# Patient Record
Sex: FEMALE | Race: WHITE | NOT HISPANIC OR LATINO | Employment: OTHER | URBAN - METROPOLITAN AREA
[De-identification: names, ages, dates, MRNs, and addresses within clinical notes are randomized per-mention and may not be internally consistent; named-entity substitution may affect disease eponyms.]

---

## 2017-04-11 ENCOUNTER — HOSPITAL ENCOUNTER (OUTPATIENT)
Dept: MAMMOGRAPHY | Facility: MEDICAL CENTER | Age: 77
Discharge: HOME | End: 2017-04-11
Payer: MEDICARE

## 2017-04-11 DIAGNOSIS — Z12.31 ENCOUNTER FOR SCREENING MAMMOGRAM FOR MALIGNANT NEOPLASM OF BREAST: ICD-10-CM

## 2017-04-11 PROCEDURE — G0202 SCR MAMMO BI INCL CAD: HCPCS

## 2017-06-02 ENCOUNTER — ALLSCRIPTS OFFICE VISIT (OUTPATIENT)
Dept: OTHER | Facility: OTHER | Age: 77
End: 2017-06-02

## 2017-10-20 ENCOUNTER — TRANSCRIBE ORDERS (OUTPATIENT)
Dept: ADMINISTRATIVE | Facility: HOSPITAL | Age: 77
End: 2017-10-20

## 2017-10-20 DIAGNOSIS — E04.1 NONTOXIC UNINODULAR GOITER: Primary | ICD-10-CM

## 2017-11-27 ENCOUNTER — HOSPITAL ENCOUNTER (OUTPATIENT)
Dept: ULTRASOUND IMAGING | Facility: MEDICAL CENTER | Age: 77
Discharge: HOME | End: 2017-11-27
Payer: MEDICARE

## 2017-11-27 DIAGNOSIS — E04.1 NONTOXIC UNINODULAR GOITER: ICD-10-CM

## 2017-11-27 PROCEDURE — 76536 US EXAM OF HEAD AND NECK: CPT

## 2017-12-01 ENCOUNTER — ALLSCRIPTS OFFICE VISIT (OUTPATIENT)
Dept: OTHER | Facility: OTHER | Age: 77
End: 2017-12-01

## 2017-12-05 NOTE — PROGRESS NOTES
Assessment  1  Nontoxic single thyroid nodule (241 0) (E04 1)    Plan  Nontoxic single thyroid nodule    · US THYROID; Status:Hold For - Scheduling; Requested for:[id number];    Perform:[facility]; Due:81Xsb9748;Ordered;single thyroid nodule; Ordered By:Humaira Ingram;   · Follow Up in 2 Years Evaluation and Treatment  Follow-up  Status: Hold For - Scheduling Requested for: [id number]   Ordered;Nontoxic single thyroid nodule; Ordered By: Demetri Butt Performed:  Due: [id number]    Discussion/Summary  Discussion Summary:   Left thyroid nodule, stable over the last 6 years  Previous biopsy benign  Options discussed with patient including follow-up in the next 2 years versus follow-up on a p r n  basis  She is opting for 2 year follow-up  We will therefore order ultrasound and see her afterwards  Counseling Documentation With Imm: The patient was counseled regarding diagnostic results,-- instructions for management,-- impressions,-- importance of compliance with treatment  Chief Complaint  Chief Complaint Free Text Note Form: Pt is here for her 2 year thyroid follow-up  new complaints at this time  History of Present Illness  Interval History: Mrs Ramona Delvalle is a 43-year-old woman here for thyroid nodules surveillance  She's had no endocrine issues since I last saw her  We have followed her for the last few years for incidentally found thyroid nodule, Which has been stable  She has no history of radiation exposure to the header neck area or a personal or family history of thyroid cancer  She had her ultrasound done in anticipation of today's visit  Review of Systems  Complete Female ROS SurgOnc:  Constitutional: The patient denies new or recent history of general fatigue, no recent weight loss, no change in appetite  Eyes: No complaints of visual problems, no scleral icterus    ENT: no complaints of ear pain, no hoarseness, no difficulty swallowing,-- no tinnitus-- and-- no new masses in head, oral cavity, or neck  Cardiovascular: No complaints of chest pain, no palpitations, no ankle edema  Respiratory: No complaints of shortness of breath, no cough  Gastrointestinal: No complaints of jaundice, no bloody stools, no pale stools  Genitourinary: No complaints of dysuria, no hematuria, no nocturia, no frequent urination, no urethral discharge  Musculoskeletal: No complaints of weakness, paralysis, joint stiffness or arthralgias,  Integumentary: No complaints of rash, no new lesions  Neurological: No complaints of convulsions, no seizures, no dizziness  Hematologic/Lymphatic: No complaints of easy bruising  ROS Reviewed:   ROS reviewed  Active Problems    1  Abdominal pain of multiple sites (789 09) (R10 9)   2  History of Acute venous embolism and thrombosis of deep vessels of distal lower extremity (453 42) (I82 4Z9)   3  Chemotherapeutics   4  Depression (311) (F32 9)   5  Edema of left lower extremity (782 3) (R60 0)   6  Encounter for screening mammogram for malignant neoplasm of breast (V76 12) (Z12 31)   7  Hypertension (401 9) (I10)   8  Hypothyroidism (244 9) (E03 9)   9  Lymphedema (457 1) (I89 0)   10  Malignant tumor of cervix (180 9) (C53 9)   11  Non Hodgkin's lymphoma (202 80) (C85 90)   12  Nontoxic single thyroid nodule (241 0) (E04 1)   13  Radiation Therapy   14  Secondary cancer of lung (197 0) (C78 00)    Past Medical History    1  History of Acute venous embolism and thrombosis of deep vessels of distal lower extremity (453 42) (I82 4Z9)   2  History of Ankle Joint Swelling (719 07)   3  History of Cerebral Artery Occlusion (434 90)   4  History of Colon Obstruction (560 9)   5  History of Decrease in appetite (783 0) (R63 0)   6  History of Hoarseness (784 42)   7  Hypertension (401 9) (I10)   8  Hypothyroidism (244 9) (E03 9)   9  History of Recent Weight Loss Involuntary    Surgical History    1  History of Back Surgery   2   History of  Section   · x 2, vertical incision   3  Chemotherapeutics   4  History of Foot Surgery   5  History of Partial Colectomy   6  Radiation Therapy   · pelvic radiation   7  History of Removal Of Lesion   8  History of Staging Lymphadenectomy - Para-aortic   9  History of Staging Lymphadenectomy - Pelvic   10  History of Total Abdominal Hysterectomy With Removal Of Both Tubes   · bilateral salpingectomy, pelvic and paraiaortic LND  Surgical History Reviewed: The surgical history was reviewed and updated today  Family History  Maternal Aunt    1  Family history of Breast Cancer (V16 3)  Family History Reviewed: The family history was reviewed and updated today  Social History     · Denied: History of Alcohol Use (History)   · Denied: History of Drug Use   · Never A Smoker  Social History Reviewed: The social history was reviewed and updated today  The social history was reviewed and is unchanged  Current Meds   1  Advil 200 MG Oral Tablet; 2 tabs 3 times dailyas needed; Therapy: (Recorded:16Fnc7487) to Recorded   2  Aspir-81 81 MG Oral Tablet Delayed Release; Therapy: 12LNO2237 to Recorded   3  Daily Multiple Vitamins Oral Tablet; TAKE 1 TABLET DAILY; Therapy: (Terrence Sleet) to Recorded   4  DiazePAM 5 MG Oral Tablet; 2 5 MG at bedtime; Therapy: (Recorded:35Cbq8222) to Recorded   5  EPA CAPS; 1000mg 2 x daily; Therapy: (Recorded:92Vha4129) to Recorded   6  Garlic TABS; TAKE AS DIRECTED; Therapy: (Recorded:86Yww0391) to Recorded   7  Immunocal PACK; 4 packs daily  2 in the am and 2 in the pm; Therapy: (Recorded:09Zgk4235) to Recorded   8  Lisinopril 5 MG Oral Tablet; 2 5 mg daily; Therapy: (Recorded:51Lcv8141) to Recorded   9  Omeprazole 20 MG Oral Capsule Delayed Release; Therapy: 12PIT7335 to Recorded   10  Probiotic Oral Capsule; Daily; Therapy: (Terrence Sleet) to Recorded   11  Synthroid 50 MCG Oral Tablet; TAKE 1 TABLET DAILY AS DIRECTED; Therapy: (Terrence Sleet) to Recorded   12   Turmeric CAPS; Therapy: ([id number]) to Recorded   13  Ubiquinol 100 MG Oral Capsule; 1 tablet daily; Therapy: (Recorded:66Ofw3823) to Recorded   14  Vitamin C 500 MG Oral Capsule; Two tablets daily each 1000 mg; Therapy: (Recorded:24Wfy1807) to Recorded   15  Vitamin D3 2000 UNIT Oral Tablet; Take 1 tablet daily; Therapy: (Recorded:55Pjz8171) to Recorded  Medication List Reviewed: The medication list was reviewed and updated today  Allergies  1  Sulfa Drugs  2  Seasonal    Vitals  Vital Signs    Recorded: 65AKD3843 10:15AM   Temperature 97 4 F   Heart Rate 71   Respiration 13   Systolic 809   Diastolic 80   Height 5 ft 2 in   Weight 143 lb    BMI Calculated 26 16   BSA Calculated 1 66   O2 Saturation 97       Physical Exam   Constitutional: General appearance: The Patient is well-developed, well-nourished female who appears her stated age in no acute distress  She is pleasant and talkative  HEENT: Sclerae are anicteric  -- Mucous membranes are moist  -- Neck is supple without adenopathy  No JVD  -- Small palpable left sided thyroid nodule  Chest: The lungs are clear to auscultation  Cardiac: Heart is regular rate  Abdomen: Abdomen is soft, nontender without masses  Extremities: There is no clubbing or cyanosis  -- There is no edema  Neuro: Grossly nonfocal  -- Gait is normal    Lymphatic: no evidence of cervical adenopathy bilaterally  -- no evidence of axillary adenopathy bilaterally  -- no evidence of inguinal adenopathy bilaterally  Skin: Warm, anicteric  Results/Data  Diagnostic Studies Reviewed Surg Onc:  X-ray Review US THYROID Final  Documents Attached          THYROID ULTRASOUND    INDICATION: Follow-up thyroid nodule  COMPARISON: 12/9/2015, 9/10/2012, 1/7/2011      TECHNIQUE: Ultrasound of the thyroid was performed with a high frequency linear transducer in transverse and sagittal planes including volumetric imaging sweeps as well as traditional still imaging technique  FINDINGS: Thyroid parenchyma is diffusely heterogeneous in echotexture  Right lobe: 4 0 x 1 0 x 1 0 cm  Left lobe: 4 8 x 2 4 x 1 6 cm  Isthmus: 0 4 cm  Nodule #1  Left isthmic nodule measuring 1 6 x 1 4 x 2 3 cm  Given differences in measuring technique, no significant change from prior  COMPOSITION: 2 points, solid or almost completely solid  ECHOGENICITY: 1 point, hyperechoic or isoechoic  SHAPE: 0 points, wider-than-tall  MARGIN: 0 points, ill-defined  ECHOGENIC FOCI: 0 points, none or large comet-tail artifacts  TI-RADS Score: TR 3 (3 points), Mildly suspicious  FNA if >2 5 cm  Follow if >1 5 cm  This nodule remains stable for greater than 5 years and has a prior benign biopsy  If there is a high level of clinical concern, continued surveillance at 2 year  intervals could be considered, otherwise no additional workup recommended  IMPRESSION:  Stable with previous non-malignant biopsy results as above  If there is a high level of clinical concern, continued surveillance at 2 year intervals could be considered  Reference: ACR Thyroid Imaging, Reporting and Data System (TI-RADS): White Paper of the [employer]  J AM Eliel Radiol 8745;81:598-015  (additional recommendations based on American Thyroid Association 2015 guidelines )      Workstation performed: [id number]    Signed by:  Singh Hoskins MD  11/28/17      by: Josue Charles Collected/Examined: 33WDN1090 11:11AMby: Josue Charles 50HEC0101 03:00PMCommunication: No patient communication needed at this time;Stage: Final Resulted: 80TAI4261 11:59AM Last Updated: 76APA9906 03:00PM Accession: [id number]  Future Appointments    Date/Time Provider Specialty Site   12/06/2017 11:00 AM Evelin Reece MD [facility]     End of Encounter Meds    1  Aspir-81 81 MG Oral Tablet Delayed Release; Therapy: 62TGJ4474 to Recorded   2   DiazePAM 5 MG Oral Tablet; 2 5 MG at bedtime; Therapy: (Recorded:29Oct2015) to Recorded   3  Garlic TABS; TAKE AS DIRECTED; Therapy: (Recorded:18Oct2016) to Recorded   4  Immunocal PACK; 4 packs daily  2 in the am and 2 in the pm; Therapy: (Recorded:18May2015) to Recorded   5  Vitamin D3 2000 UNIT Oral Tablet; Take 1 tablet daily; Therapy: (Recorded:18Oct2016) to Recorded    6  Lisinopril 5 MG Oral Tablet; 2 5 mg daily; Therapy: (Recorded:18May2015) to Recorded    7  Advil 200 MG Oral Tablet; 2 tabs 3 times dailyas needed; Therapy: (Recorded:88Amh6451) to Recorded   8  Daily Multiple Vitamins Oral Tablet; TAKE 1 TABLET DAILY; Therapy: (Heather Vitale) to Recorded   9  EPA CAPS; 1000mg 2 x daily; Therapy: (Recorded:18May2015) to Recorded   10  Omeprazole 20 MG Oral Capsule Delayed Release; Therapy: 15TPE3558 to Recorded   11  Probiotic Oral Capsule; Daily; Therapy: (Heather Vitale) to Recorded   12  Synthroid 50 MCG Oral Tablet (Levothyroxine Sodium); TAKE 1 TABLET DAILY AS  DIRECTED; Therapy: (Heather Vitale) to Recorded   13  Turmeric CAPS; Therapy: (YUTBNMJO:40RIR4329) to Recorded   14  Ubiquinol 100 MG Oral Capsule; 1 tablet daily; Therapy: (Recorded:18May2015) to Recorded   15  Vitamin C 500 MG Oral Capsule; Two tablets daily each 1000 mg;   Therapy: (Recorded:18May2015) to Recorded    Signatures   Electronically signed by : Kiara Eli MD; Dec  1 2017 11:00AM EST                       (Author)

## 2017-12-06 ENCOUNTER — ALLSCRIPTS OFFICE VISIT (OUTPATIENT)
Dept: OTHER | Facility: OTHER | Age: 77
End: 2017-12-06

## 2017-12-07 NOTE — PROGRESS NOTES
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  She is clinically without evidence of disease recurrence  Her performance status is 0  Plan  Malignant tumor of cervix    · (1) BUN; Status:Active; Requested NMV:82UJI7423; Perform:[facility]; QYE:70TYA0318;IKQESDY; For:Malignant tumor of cervix; Ordered By:Miguel Albrecht;   · (1) CREATININE; Status:Active; Requested BHK:86JDW7968; Perform:[facility]; GXS:49AMM7809;SAEBXCL;TPZXE of cervix; Ordered By:Miguel Albrecht;   · * CT CHEST ABDOMEN PELVIS W CONTRAST; Status:Hold For - Scheduling; Requestedfor:06Jun2018; Perform:[facility]; FGK:23ASH8084;QSBVCAH;DQNXJ of cervix; Ordered By:iMguel Albrecht;   · Follow-up visit in 6 months Evaluation and Treatment  Follow-up  Status: Hold For -Scheduling  Requested for: 74CVW8985   Ordered;Malignant tumor of cervix; Ordered By: Joaquin Flores Performed:  Due: 90SYZ6857  1  CT scan of chest abdomen and pelvis prior to her next visit in 6 months  Chief Complaint  Chief Complaint Free Text Note Form: Cervical cancer surveillance      History of Present Illness  Problem [facility]:   1  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  Large B-Cell lymphoma  Previous Therapy:   1  ALISTAIR-BSO, pelvic and paraaortic lymph node dissection on 01/11/2011 for presumed endometrial cancer  3 2 cm squamous cell carcinoma identified  Outer third invasion  Microscopically positive posterior margin  32 negative lymph nodes  Negative washings  Concurrent cisplatin-based chemotherapy along with whole pelvic radiation completed 04/11/2011  Coumadin therapy for history of lower extremity DVT which has been discontined  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R-CHOP chemotherapy for large B-cell lymphoma last given December 6, 2012 Radiation therapy to thoracic vertebral bodies February 2013  Right upper lobe wedge resection x2 March 6, 2014Recurrent squamous cell carcinomaThoracotomy and resection of left pulmonary lesions in September of 2014   Free Text HPI: 77-year-old returns for cervical cancer surveillance  She saw Gastroenterology for her intermittent abdominal cramping with vomiting  She had an upper GI series performed on 11/9/2017 that did not reveal any evidence of obstruction  She is currently on a low residue diet and for the past month, she has not had any vomiting episodes  She was also started on omeprazole  No vaginal bleeding or pelvic pain  She has followed up with surgical oncology regarding her thyroid nodules  These are stable  Overall, her quality of life is good  Review of Systems  Complete-Female Gyn Onc:  Constitutional: feeling tired-- and-- recent weight loss, but-- no fever,-- not feeling poorly,-- no recent weight gain-- and-- no chills  Eyes: No complaints of eye pain, no red eyes, no eyesight problems, no discharge, no dry eyes, no itching of eyes  ENT: no nose bleeds  Cardiovascular: No chest pain, no lower extremity edema  Respiratory: No shortness of breath  Gastrointestinal: abdominal pain,-- vomiting,-- constipation-- and-- diarrhea, but-- no nausea-- and-- no blood in stools  Genitourinary: No complaints of dysuria, no incontinence, no pelvic pain, no dysmenorrhea, no vaginal discharge or bleeding  Musculoskeletal: No limb swelling  Integumentary: No skin lesions  Neurological: No headache, no neuropathy  Psychiatric: Not suicidal, no sleep disturbance, no anxiety or depression, no change in personality, no emotional problems  Endocrine: No complaints of proptosis, no hot flashes, no muscle weakness, no deepening of the voice, no feelings of weakness  Hematologic/Lymphatic: No complaints of swollen glands, no swollen glands in the neck, does not bleed easily, does not bruise easily  ROS Reviewed:   ROS reviewed  Active Problems    1  Abdominal pain of multiple sites (789 09) (R10 9)   2  History of Acute venous embolism and thrombosis of deep vessels of distal lower extremity (453 42) (I82 4Z9)   3  Chemotherapeutics   4  Depression (311) (F32 9)   5  Edema of left lower extremity (782 3) (R60 0)   6  Encounter for screening mammogram for malignant neoplasm of breast (V76 12) (Z12 31)   7  Hypertension (401 9) (I10)   8  Hypothyroidism (244 9) (E03 9)   9  Lymphedema (457 1) (I89 0)   10  Malignant tumor of cervix (180 9) (C53 9)   11  Non Hodgkin's lymphoma (202 80) (C85 90)   12  Nontoxic single thyroid nodule (241 0) (E04 1)   13  Radiation Therapy   14  Secondary cancer of lung (197 0) (C78 00)    Past Medical History    1  History of Acute venous embolism and thrombosis of deep vessels of distal lower extremity (453 42) (I82 4Z9)   2  History of Ankle Joint Swelling (719 07)   3  History of Cerebral Artery Occlusion (434 90)   4  History of Colon Obstruction (560 9)   5  History of Decrease in appetite (783 0) (R63 0)   6  History of Hoarseness (784 42)   7  Hypertension (401 9) (I10)   8  Hypothyroidism (244 9) (E03 9)   9  History of Recent Weight Loss Involuntary  Active Problems And Past Medical History Reviewed: The active problems and past medical history were reviewed and updated today  Surgical History  1  History of Back Surgery   2  History of  Section   3  Chemotherapeutics   4  History of Foot Surgery   5  History of Partial Colectomy   6  Radiation Therapy   7  History of Removal Of Lesion   8  History of Staging Lymphadenectomy - Para-aortic   9  History of Staging Lymphadenectomy - Pelvic   10  History of Total Abdominal Hysterectomy With Removal Of Both Tubes    Family History  Maternal Aunt    1   Family history of Breast Cancer (V16 3)    Social History     · Denied: History of Alcohol Use (History)   · Denied: History of Drug Use   · Never A Smoker    Current Meds   1  Advil 200 MG Oral Tablet; 2 tabs 3 times dailyas needed; Therapy: (Recorded:06Bnb8483) to Recorded   2  Aspir-81 81 MG Oral Tablet Delayed Release; Therapy: 44HER5608 to Recorded   3  Daily Multiple Vitamins Oral Tablet; TAKE 1 TABLET DAILY; Therapy: (Perla Grapes) to Recorded   4  DiazePAM 5 MG Oral Tablet; 2 5 MG at bedtime; Therapy: (Recorded:29Oct2015) to Recorded   5  EPA CAPS; 1000mg 2 x daily; Therapy: (Recorded:82Tqa7719) to Recorded   6  Garlic TABS; TAKE AS DIRECTED; Therapy: (Recorded:18Oct2016) to Recorded   7  Immunocal PACK; 4 packs daily  2 in the am and 2 in the pm; Therapy: (Recorded:28Fcz1352) to Recorded   8  Lisinopril 5 MG Oral Tablet; 2 5 mg daily; Therapy: (Recorded:18May2015) to Recorded   9  Omeprazole 20 MG Oral Capsule Delayed Release; Therapy: 62NQY5162 to Recorded   10  Probiotic Oral Capsule; Daily; Therapy: (Perla Grapes) to Recorded   11  Synthroid 50 MCG Oral Tablet; TAKE 1 TABLET DAILY AS DIRECTED; Therapy: (Perla Grapes) to Recorded   12  Turmeric CAPS; Therapy: (([phone] [phone]) to Recorded   13  Ubiquinol 100 MG Oral Capsule; 1 tablet daily; Therapy: (Recorded:04Yho4357) to Recorded   14  Vitamin C 500 MG Oral Capsule; Two tablets daily each 1000 mg; Therapy: (Recorded:18May2015) to Recorded   15  Vitamin D3 2000 UNIT Oral Tablet; Take 1 tablet daily; Therapy: (Recorded:18Oct2016) to Recorded  Medication List Reviewed: The medication list was reviewed and updated today  Allergies  1  Sulfa Drugs  2   Seasonal    Vitals  Vital Signs    Recorded: 35XZX6653 11:27AM   Temperature 97 6 F, Oral   Heart Rate 64, L Radial   Pulse Quality Normal, L Radial   Respiration Quality Normal   Respiration 12   Systolic 193, LUE, Sitting   Diastolic 82, LUE, Sitting   Height 5 ft 2 in   Weight 141 lb 8 oz   BMI Calculated 25 88   BSA Calculated 1 65       Physical Exam   Constitutional  General appearance: No acute distress, well appearing and well nourished  Neck  Neck: Normal, supple, trachea midline, no masses  Thyroid: Normal, no thyromegaly  Pulmonary  Respiratory effort: No increased work of breathing or signs of respiratory distress  Cardiovascular  Peripheral vascular exam: Abnormal  -- SAMANTHA hose bilaterally  Genitourinary  External genitalia: Normal and no lesions appreciated  Vagina: Abnormal  -- severe stenosis due to RT  No masses or lesions  Urethra: Abnormal  -- see above  Urethral meatus: Normal    Bladder: Normal, soft, non-tender and no prolapse or masses appreciated  Cervix: Surgically absent  Uterus: Surgically absent  Adnexa/parametria: Surgically absent  -- there is diffuse pelvic thickening, no palpable mass  Abdomen  Abdomen: Normal, soft, non-tender, and no organomegaly noted  Liver and spleen: No hepatomegaly or splenomegaly  Examination for hernias: No hernias appreciated  Lymphatic  Palpation of lymph nodes in neck, axillae, groin and/or other locations: No lymphadenopathy or masses noted  Skin  Skin and subcutaneous tissue: Normal skin turgor and no rashes     Palpation of skin and subcutaneous tissue: Normal    Psychiatric  Orientation to person, place, and time: Normal    Mood and affect: Normal    GOG performance status is: 0      Signatures   Electronically signed by : Maegan Cuadra MD; Dec  6 2017 12:05PM EST                       (Author)

## 2018-01-14 VITALS
TEMPERATURE: 97.4 F | HEIGHT: 62 IN | DIASTOLIC BLOOD PRESSURE: 64 MMHG | RESPIRATION RATE: 12 BRPM | BODY MASS INDEX: 27.14 KG/M2 | SYSTOLIC BLOOD PRESSURE: 118 MMHG | WEIGHT: 147.5 LBS | HEART RATE: 72 BPM

## 2018-01-22 VITALS
RESPIRATION RATE: 12 BRPM | SYSTOLIC BLOOD PRESSURE: 120 MMHG | HEART RATE: 64 BPM | TEMPERATURE: 97.6 F | WEIGHT: 141.5 LBS | HEIGHT: 62 IN | BODY MASS INDEX: 26.04 KG/M2 | DIASTOLIC BLOOD PRESSURE: 82 MMHG

## 2018-01-23 VITALS
SYSTOLIC BLOOD PRESSURE: 128 MMHG | DIASTOLIC BLOOD PRESSURE: 80 MMHG | HEIGHT: 62 IN | HEART RATE: 71 BPM | TEMPERATURE: 97.4 F | OXYGEN SATURATION: 97 % | WEIGHT: 143 LBS | BODY MASS INDEX: 26.31 KG/M2 | RESPIRATION RATE: 13 BRPM

## 2018-03-28 ENCOUNTER — TRANSCRIBE ORDERS (OUTPATIENT)
Dept: ADMINISTRATIVE | Facility: HOSPITAL | Age: 78
End: 2018-03-28

## 2018-03-28 DIAGNOSIS — Z12.39 BREAST SCREENING: Primary | ICD-10-CM

## 2018-03-29 DIAGNOSIS — Z12.31 ENCOUNTER FOR SCREENING MAMMOGRAM FOR BREAST CANCER: Primary | ICD-10-CM

## 2018-04-20 ENCOUNTER — HOSPITAL ENCOUNTER (OUTPATIENT)
Dept: MAMMOGRAPHY | Facility: HOSPITAL | Age: 78
Discharge: HOME | End: 2018-04-20
Payer: MEDICARE

## 2018-04-20 DIAGNOSIS — Z12.31 ENCOUNTER FOR SCREENING MAMMOGRAM FOR BREAST CANCER: ICD-10-CM

## 2018-04-20 PROCEDURE — 77067 SCR MAMMO BI INCL CAD: CPT

## 2018-04-25 ENCOUNTER — OFFICE VISIT (OUTPATIENT)
Dept: GYNECOLOGIC ONCOLOGY | Facility: HOSPITAL | Age: 78
End: 2018-04-25
Payer: MEDICARE

## 2018-04-25 VITALS
HEIGHT: 61 IN | WEIGHT: 142.7 LBS | HEART RATE: 64 BPM | SYSTOLIC BLOOD PRESSURE: 132 MMHG | RESPIRATION RATE: 14 BRPM | TEMPERATURE: 98.2 F | DIASTOLIC BLOOD PRESSURE: 76 MMHG | BODY MASS INDEX: 26.94 KG/M2

## 2018-04-25 DIAGNOSIS — E04.1 THYROID NODULE: Primary | ICD-10-CM

## 2018-04-25 DIAGNOSIS — C53.9 MALIGNANT NEOPLASM OF CERVIX, UNSPECIFIED SITE (HCC): ICD-10-CM

## 2018-04-25 PROBLEM — C83.30 DIFFUSE LARGE B CELL LYMPHOMA (HCC): Status: ACTIVE | Noted: 2018-04-25

## 2018-04-25 PROCEDURE — 99214 OFFICE O/P EST MOD 30 MIN: CPT | Performed by: OBSTETRICS & GYNECOLOGY

## 2018-04-25 RX ORDER — DIAZEPAM 5 MG/1
2.5 TABLET ORAL EVERY 6 HOURS PRN
COMMUNITY

## 2018-04-25 RX ORDER — DICYCLOMINE HYDROCHLORIDE 10 MG/1
1 CAPSULE ORAL
COMMUNITY
Start: 2017-01-21

## 2018-04-25 RX ORDER — LEVOTHYROXINE SODIUM 50 MCG
50 TABLET ORAL DAILY
COMMUNITY
Start: 2018-03-23

## 2018-04-25 RX ORDER — ASPIRIN 81 MG/1
TABLET ORAL
COMMUNITY
Start: 2017-12-01

## 2018-04-25 RX ORDER — LISINOPRIL 2.5 MG/1
2.5 TABLET ORAL DAILY
Refills: 3 | COMMUNITY
Start: 2018-02-12

## 2018-04-25 RX ORDER — SENNOSIDES 8.6 MG
650 CAPSULE ORAL EVERY 8 HOURS PRN
COMMUNITY

## 2018-04-25 NOTE — LETTER
April 25, 2018     Charlene Gayle MD  [address]    Patient: Rian Mcgill   YOB: 1940   Date of Visit: 4/25/2018       Dear Dr Stefany Vera: Thank you for referring Luz Elena Jaramillo to me for evaluation  Below are my notes for this consultation  If you have questions, please do not hesitate to call me  I look forward to following your patient along with you  Sincerely,        Kathy Candelaria MD        CC: MD Kathy Guaman MD  4/25/2018 10:08 AM  Sign at close encounter  Assessment/Plan:    Problem List Items Addressed This Visit        Endocrine    Thyroid nodule - Primary       Will refer to Surgical Oncology given findings on recent PET-CT scan         Relevant Medications    SYNTHROID 50 MCG tablet    Other Relevant Orders    Ambulatory referral to Surgical Oncology       Genitourinary    Cervical cancer [facility])     75-year-old with recurrent stage IB1 cervical cancer with pulmonary metastases  She has an isolated left lung lesion measuring 2 2 x 0 9 cm  Her performance status is 0   1  We discussed treatment options including surgical resection, radiation therapy, immunotherapy  Currently, she does not desire additional surgery  As this is a local recurrence, it may be amenable to radiation therapy  She has had good success previously with local therapy instead of systemic treatment  I discussed some of the risks and benefits of immunotherapy and the plan will be to utilize immunotherapy in the event that she cannot have radiation to manage local recurrence or she has a multi site recurrence that would require systemic treatment  I spent 25 minutes with the patient  More than half the time was spent in counseling and coordination of care regarding treatment of her recurrent cervical cancer                   CHIEF COMPLAINT:  Treatment discussion      Problem:  Cervical cancer ([facility] [address] )    Staging form: Cervix Uteri, AJCC 8th Edition    - Clinical: FIGO Stage IB1 (cT1b1, cM0) - Signed by Sofia Mason PA-C on 4/25/2018      Previous therapy:     Cervical cancer 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         3/6/2014 Surgery     Right upper lobe resection x 2  A  Recurrent SCC         9/1/2014 Surgery     Thoracotomy and resection of left pulmonary lesions           Diffuse large B cell lymphoma ([facility] [address] )     Initial Diagnosis     Diffuse large B cell lymphoma ([facility] [address] )          - 12/6/2012 Chemotherapy     R-CHOP              Patient ID: Rosy Scherer is a 76 y o  female   13-year-old returns for treatment discussion  She had a CT scan of the chest abdomen and pelvis on 4/15/2018 that revealed an increase in size of a left apical lung lesion measuring 2 2 x 0 9 cm previously 1 3 x 0 6 cm  PET-CT scan performed on 4/23/2018 revealed focal uptake in the thyroid at the isthmus measuring 2 5 x 2 cm which had increased in size since 2014  The left apical lung lesion also had uptake consistent with malignancy  There was no other foci concerning for disease  Her abdominal cramping has decreased significantly by changing to a low residue diet  She has no vaginal bleeding or pelvic pain  Overall, her quality of life is good  No other interval changes in her medications or medical history since her last visit          The following portions of the patient's history were reviewed and updated as appropriate: allergies, current medications, past family history, past medical history, past social history, past surgical history and problem list     Review of Systems    Current Outpatient Prescriptions   Medication Sig Dispense Refill    ascorbic acid (VITAMIN C) 1000 MG tablet Take 1,000 mg by mouth      aspirin (ASPIR-81) 81 mg EC tablet Take by mouth      diazepam (VALIUM) 5 mg tablet Take 2 5 mg by mouth every 6 (six) hours as needed for anxiety      dicyclomine (BENTYL) 10 mg capsule Take 1 capsule by mouth      Probiotic Product (ALIGN PO) Take 1 capsule by mouth      acetaminophen (TYLENOL) 650 mg CR tablet Take 650 mg by mouth every 8 (eight) hours      lisinopril (ZESTRIL) 2 5 mg tablet Take 2 5 mg by mouth daily  3    SYNTHROID 50 MCG tablet        No current facility-administered medications for this visit  Objective:    Blood pressure 132/76, pulse 64, temperature 98 2 °F (36 8 °C), temperature source Oral, resp  rate 14, height 5' 0 5" (1 537 m), weight 64 7 kg (142 lb 11 2 oz)  Body mass index is 27 41 kg/m²  Body surface area is 1 63 meters squared  Physical Exam   Constitutional: She is oriented to person, place, and time  She appears well-developed and well-nourished  No distress  Neurological: She is alert and oriented to person, place, and time  Skin: She is not diaphoretic  Psychiatric: She has a normal mood and affect   Her behavior is normal  Judgment and thought content normal

## 2018-04-25 NOTE — PROGRESS NOTES
Assessment/Plan:    Problem List Items Addressed This Visit        Endocrine    Thyroid nodule - Primary       Will refer to Surgical Oncology given findings on recent PET-CT scan         Relevant Medications    SYNTHROID 50 MCG tablet    Other Relevant Orders    Ambulatory referral to Surgical Oncology       Genitourinary    Cervical cancer [facility])     73-year-old with recurrent stage IB1 cervical cancer with pulmonary metastases  She has an isolated left lung lesion measuring 2 2 x 0 9 cm  Her performance status is 0   1  We discussed treatment options including surgical resection, radiation therapy, immunotherapy  Currently, she does not desire additional surgery  As this is a local recurrence, it may be amenable to radiation therapy  She has had good success previously with local therapy instead of systemic treatment  I discussed some of the risks and benefits of immunotherapy and the plan will be to utilize immunotherapy in the event that she cannot have radiation to manage local recurrence or she has a multi site recurrence that would require systemic treatment  I spent 25 minutes with the patient  More than half the time was spent in counseling and coordination of care regarding treatment of her recurrent cervical cancer                   CHIEF COMPLAINT:  Treatment discussion      Problem:  [facility]    Staging form: Cervix Uteri, AJCC 8th Edition    - Clinical: FIGO Stage IB1 (cT1b1, cM0) - Signed by Dominik Woo PA-C on 4/25/2018      Previous therapy:     Cervical cancer [facility])     Initial Diagnosis     Cervical cancer ([facility] [address] )         1/11/2011 Surgery     ALISTAIR-BSO, pelvic and paraaortic lymph node dissection for presumed endometrial cancer  A  3 2 cm SCC, outer third invasion, microscopically positive posterior margin, 32 negative lymph nodes, negative washings          Chemotherapy     Cisplatin with radiation          - 4/11/2011 Radiation     Whole pelvic radiotherapy Adverse Reaction     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         3/6/2014 Surgery     Right upper lobe resection x 2  A  Recurrent SCC         9/1/2014 Surgery     Thoracotomy and resection of left pulmonary lesions           Diffuse large B cell lymphoma ([facility] [address] )     Initial Diagnosis     Diffuse large B cell lymphoma ([facility] [address] )          - 12/6/2012 Chemotherapy     R-CHOP              Patient ID: Edie Yousif is a 76 y o  female   26-year-old returns for treatment discussion  She had a CT scan of the chest abdomen and pelvis on 4/15/2018 that revealed an increase in size of a left apical lung lesion measuring 2 2 x 0 9 cm previously 1 3 x 0 6 cm  PET-CT scan performed on 4/23/2018 revealed focal uptake in the thyroid at the isthmus measuring 2 5 x 2 cm which had increased in size since 2014  The left apical lung lesion also had uptake consistent with malignancy  There was no other foci concerning for disease  Her abdominal cramping has decreased significantly by changing to a low residue diet  She has no vaginal bleeding or pelvic pain  Overall, her quality of life is good  No other interval changes in her medications or medical history since her last visit          The following portions of the patient's history were reviewed and updated as appropriate: allergies, current medications, past family history, past medical history, past social history, past surgical history and problem list     Review of Systems    Current Outpatient Prescriptions   Medication Sig Dispense Refill    ascorbic acid (VITAMIN C) 1000 MG tablet Take 1,000 mg by mouth      aspirin (ASPIR-81) 81 mg EC tablet Take by mouth      diazepam (VALIUM) 5 mg tablet Take 2 5 mg by mouth every 6 (six) hours as needed for anxiety      dicyclomine (BENTYL) 10 mg capsule Take 1 capsule by mouth      Probiotic Product (ALIGN PO) Take 1 capsule by mouth      acetaminophen (TYLENOL) 650 mg CR tablet Take 650 mg by mouth every 8 (eight) hours      lisinopril (ZESTRIL) 2 5 mg tablet Take 2 5 mg by mouth daily  3    SYNTHROID 50 MCG tablet        No current facility-administered medications for this visit  Objective:    Blood pressure 132/76, pulse 64, temperature 98 2 °F (36 8 °C), temperature source Oral, resp  rate 14, height 5' 0 5" (1 537 m), weight 64 7 kg (142 lb 11 2 oz)  Body mass index is 27 41 kg/m²  Body surface area is 1 63 meters squared  Physical Exam   Constitutional: She is oriented to person, place, and time  She appears well-developed and well-nourished  No distress  Neurological: She is alert and oriented to person, place, and time  Skin: She is not diaphoretic  Psychiatric: She has a normal mood and affect   Her behavior is normal  Judgment and thought content normal

## 2018-04-25 NOTE — ASSESSMENT & PLAN NOTE
38-year-old with recurrent stage IB1 cervical cancer with pulmonary metastases  She has an isolated left lung lesion measuring 2 2 x 0 9 cm  Her performance status is 0   1  We discussed treatment options including surgical resection, radiation therapy, immunotherapy  Currently, she does not desire additional surgery  As this is a local recurrence, it may be amenable to radiation therapy  She has had good success previously with local therapy instead of systemic treatment  I discussed some of the risks and benefits of immunotherapy and the plan will be to utilize immunotherapy in the event that she cannot have radiation to manage local recurrence or she has a multi site recurrence that would require systemic treatment  I spent 25 minutes with the patient  More than half the time was spent in counseling and coordination of care regarding treatment of her recurrent cervical cancer

## 2018-05-01 DIAGNOSIS — E04.1 NONTOXIC THYROID NODULE: Primary | ICD-10-CM

## 2018-05-01 DIAGNOSIS — Z86.718 HISTORY OF DVT (DEEP VEIN THROMBOSIS): Primary | ICD-10-CM

## 2018-05-03 ENCOUNTER — HOSPITAL ENCOUNTER (OUTPATIENT)
Dept: ULTRASOUND IMAGING | Facility: MEDICAL CENTER | Age: 78
Discharge: HOME | End: 2018-05-03
Payer: MEDICARE

## 2018-05-03 DIAGNOSIS — E04.1 NONTOXIC THYROID NODULE: ICD-10-CM

## 2018-05-03 PROCEDURE — 76536 US EXAM OF HEAD AND NECK: CPT

## 2018-05-04 ENCOUNTER — OFFICE VISIT (OUTPATIENT)
Dept: SURGICAL ONCOLOGY | Facility: CLINIC | Age: 78
End: 2018-05-04
Payer: MEDICARE

## 2018-05-04 VITALS
DIASTOLIC BLOOD PRESSURE: 62 MMHG | HEIGHT: 61 IN | SYSTOLIC BLOOD PRESSURE: 144 MMHG | TEMPERATURE: 98.7 F | WEIGHT: 144.4 LBS | HEART RATE: 70 BPM | BODY MASS INDEX: 27.26 KG/M2

## 2018-05-04 DIAGNOSIS — E04.1 THYROID NODULE: ICD-10-CM

## 2018-05-04 PROBLEM — C79.9 METASTASIS FROM CERVICAL CANCER (HCC): Status: ACTIVE | Noted: 2017-02-07

## 2018-05-04 PROBLEM — N28.9 RENAL INSUFFICIENCY: Status: ACTIVE | Noted: 2017-02-08

## 2018-05-04 PROBLEM — S32.030D: Status: ACTIVE | Noted: 2017-02-07

## 2018-05-04 PROBLEM — C82.00 FOLLICULAR LYMPHOMA GRADE I (HCC): Status: ACTIVE | Noted: 2017-02-07

## 2018-05-04 PROBLEM — C53.9 METASTASIS FROM CERVICAL CANCER (HCC): Status: ACTIVE | Noted: 2017-02-07

## 2018-05-04 PROBLEM — R91.1 NODULE OF LEFT LUNG: Status: ACTIVE | Noted: 2018-04-18

## 2018-05-04 PROCEDURE — 99214 OFFICE O/P EST MOD 30 MIN: CPT | Performed by: SURGERY

## 2018-05-04 RX ORDER — MULTIVIT-MIN/IRON/FOLIC ACID/K 18-600-40
CAPSULE ORAL
COMMUNITY
End: 2019-01-18 | Stop reason: HOSPADM

## 2018-05-04 RX ORDER — OMEPRAZOLE 20 MG/1
20 CAPSULE, DELAYED RELEASE ORAL
COMMUNITY

## 2018-05-04 RX ORDER — METRONIDAZOLE 7.5 MG/G
LOTION TOPICAL
COMMUNITY
Start: 2018-03-23 | End: 2019-01-18 | Stop reason: HOSPADM

## 2018-05-04 RX ORDER — VITAMIN B COMPLEX
TABLET ORAL
COMMUNITY
End: 2019-01-18 | Stop reason: SDUPTHER

## 2018-05-04 RX ORDER — TURMERIC 100 %
2 POWDER (GRAM) MISCELLANEOUS
COMMUNITY
End: 2019-01-18 | Stop reason: SDUPTHER

## 2018-05-04 RX ORDER — ACETAMINOPHEN 160 MG
1 TABLET,DISINTEGRATING ORAL CONTINUOUS PRN
COMMUNITY

## 2018-05-04 RX ORDER — GUAIFENESIN 600 MG
1200 TABLET, EXTENDED RELEASE 12 HR ORAL
COMMUNITY

## 2018-05-04 NOTE — LETTER
May 4, 2018     Shabnam Chatman MD  [address] [place] [address]    Patient: Dane Leslie   YOB: 1940   Date of Visit: 5/4/2018       Dear Dr Bharathi Madrigal: Thank you for referring Francesca Basurto to me for evaluation  Below are my notes for this consultation  If you have questions, please do not hesitate to call me  I look forward to following your patient along with you  Sincerely,        Mathieu Sy MD        CC: MD Razia Yanes MD Rhonda Eye, MD Mathieu Sales MD  5/4/2018 11:32 AM  Sign at close encounter     Surgical Oncology Follow Up       [address]   1940  [phone]  [facility]  [facility] [place] [id number]  [id number]    Chief Complaint   Patient presents with    Follow-up     follow-up Pet CT       Assessment/Plan:    No problem-specific Assessment & Plan notes found for this encounter  Diagnoses and all orders for this visit:    Thyroid nodule  -     Ambulatory referral to Surgical Oncology  -     US guided thyroid biopsy; Future    Other orders  -     Ubiquinol 100 MG CAPS; Take 1 capsule by mouth  -     DAILY MULTIPLE VITAMINS PO; Take 1 tablet by mouth daily  -     Omega-3 Fatty Acids (EPA) 1000 MG CAPS; Take by mouth  -     Garlic 489 MG TABS; Take by mouth  -     guaiFENesin (MUCINEX) 600 mg 12 hr tablet; Take 1,200 mg by mouth  -     METRONIDAZOLE, TOPICAL, 0 75 % LOTN;   -     omeprazole (PRILOSEC) 20 mg delayed release capsule; Take 20 mg by mouth  -     Probiotic Product (PROBIOTIC-10) CAPS; Take by mouth daily  -     Turmeric, Curcuma Longa, (TURMERIC ROOT) POWD; Take 2 capsules by mouth  -     Ascorbic Acid (VITAMIN C) 500 MG CAPS; Take by mouth  -     Cholecalciferol (VITAMIN D3) 2000 units capsule;  Take 1 tablet by mouth daily        Advance Care Planning/Advance Directives:  Discussed and Did not discuss disease status, cancer treatment plans and/or cancer treatment goals with the patient  Malignant tumor of cervix [facility])     Initial Diagnosis     Cervical cancer ([facility] )         1/11/2011 Surgery     ALISTAIR-BSO, pelvic and paraaortic lymph node dissection for presumed endometrial cancer  A  3 2 cm SCC, outer third invasion, microscopically positive posterior margin, 32 negative lymph nodes, negative washings          Chemotherapy     Cisplatin with radiation          - 4/11/2011 Radiation     Whole pelvic radiotherapy          Adverse Reaction     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         3/6/2014 Surgery     Right upper lobe resection x 2  A  Recurrent SCC         9/1/2014 Surgery     Thoracotomy and resection of left pulmonary lesions           Diffuse large B cell lymphoma ([facility] )     Initial Diagnosis     Diffuse large B cell lymphoma ([facility] )          - 12/6/2012 Chemotherapy     R-CHOP            History of Present Illness:   Patient is 42-year-old woman here for thyroid nodule follow-up       -Interval History:She has a history of multiple cancers and is presently set to undergo radiation therapy for metastatic lung nodules  She was found on recent PET scan have PET avidity in her thyroid  She had a had a prior biopsy in 2011 which was reportedly benign  We had ordered ultrasound which confirms gradual enlargement in the left thyroid nodule  She is otherwise asymptomatic  She denies a history of radiation exposure to the head or neck area, though she has had radiation to her chest as well as her pelvis  Review of Systems:  Review of Systems   Constitutional: Negative  HENT: Negative  Eyes: Negative  Respiratory: Negative  Cardiovascular: Negative  Gastrointestinal: Negative  Endocrine: Negative  Genitourinary: Negative      Musculoskeletal: Negative  Skin: Negative  Allergic/Immunologic: Negative  Neurological: Negative  Hematological: Negative  Psychiatric/Behavioral: Negative  Patient Active Problem List   Diagnosis    History of DVT (deep vein thrombosis)    Malignant tumor of cervix (HCC)    Diffuse large B cell lymphoma (HCC)    Nontoxic single thyroid nodule    Abdominal pain of multiple sites    Chemotherapy follow-up examination    Compression fracture of third lumbar vertebra with routine healing    Depression    Edema of left lower extremity    Benign essential hypertension    Follicular lymphoma grade I (HCC)    Hypertension    Hypothyroidism    Lymphedema    Metastasis from cervical cancer (HCC)    Nodule of left lung    Non Hodgkin's lymphoma (HCC)    Other diseases of lung, not elsewhere classified    Renal insufficiency    Secondary cancer of lung ([facility] )     Past Medical History:   Diagnosis Date    Cervical cancer ([facility] ) 01/16/2011    History of DVT (deep vein thrombosis)      No past surgical history on file  No family history on file  Social History     Social History    Marital status: /Civil Union     Spouse name: N/A    Number of children: N/A    Years of education: N/A     Occupational History    Not on file       Social History Main Topics    Smoking status: Never Smoker    Smokeless tobacco: Never Used    Alcohol use No    Drug use: No    Sexual activity: Not on file     Other Topics Concern    Not on file     Social History Narrative    No narrative on file       Current Outpatient Prescriptions:     Ubiquinol 100 MG CAPS, Take 1 capsule by mouth, Disp: , Rfl:     acetaminophen (TYLENOL) 650 mg CR tablet, Take 650 mg by mouth every 8 (eight) hours, Disp: , Rfl:     ascorbic acid (VITAMIN C) 1000 MG tablet, Take 1,000 mg by mouth, Disp: , Rfl:     Ascorbic Acid (VITAMIN C) 500 MG CAPS, Take by mouth, Disp: , Rfl:     aspirin (ASPIR-81) 81 mg EC tablet, Take by mouth, Disp: , Rfl:     Cholecalciferol (VITAMIN D3) 2000 units capsule, Take 1 tablet by mouth daily, Disp: , Rfl:     DAILY MULTIPLE VITAMINS PO, Take 1 tablet by mouth daily, Disp: , Rfl:     diazepam (VALIUM) 5 mg tablet, Take 2 5 mg by mouth every 6 (six) hours as needed for anxiety, Disp: , Rfl:     dicyclomine (BENTYL) 10 mg capsule, Take 1 capsule by mouth, Disp: , Rfl:     enoxaparin (LOVENOX) 40 mg/0 4 mL, Take 1 injection the morning your flight departs, Disp: 2 Syringe, Rfl: 0    Garlic 160 MG TABS, Take by mouth, Disp: , Rfl:     guaiFENesin (MUCINEX) 600 mg 12 hr tablet, Take 1,200 mg by mouth, Disp: , Rfl:     lisinopril (ZESTRIL) 2 5 mg tablet, Take 2 5 mg by mouth daily, Disp: , Rfl: 3    METRONIDAZOLE, TOPICAL, 0 75 % LOTN, , Disp: , Rfl:     Omega-3 Fatty Acids (EPA) 1000 MG CAPS, Take by mouth, Disp: , Rfl:     omeprazole (PRILOSEC) 20 mg delayed release capsule, Take 20 mg by mouth, Disp: , Rfl:     Probiotic Product (ALIGN PO), Take 1 capsule by mouth, Disp: , Rfl:     Probiotic Product (PROBIOTIC-10) CAPS, Take by mouth daily, Disp: , Rfl:     SYNTHROID 50 MCG tablet, , Disp: , Rfl:     Turmeric, Curcuma Longa, (TURMERIC ROOT) POWD, Take 2 capsules by mouth, Disp: , Rfl:   Allergies   Allergen Reactions    Other Other (See Comments)     BAND AIDES RASH    Pollen Extract Allergic Rhinitis    Sulfa Antibiotics Rash     Vitals:    05/04/18 1058   BP: 144/62   Pulse: 70   Temp: 98 7 °F (37 1 °C)       Physical Exam   Constitutional: She is oriented to person, place, and time  She appears well-developed and well-nourished  HENT:   Head: Normocephalic and atraumatic  Right Ear: External ear normal    Left Ear: External ear normal    Eyes: EOM are normal  Pupils are equal, round, and reactive to light  Neck: Normal range of motion  Neck supple  Cardiovascular: Normal rate, regular rhythm, normal heart sounds and intact distal pulses      Pulmonary/Chest: Effort normal and breath sounds normal    Abdominal: Soft  Bowel sounds are normal    Musculoskeletal: Normal range of motion  Neurological: She is alert and oriented to person, place, and time  Skin: Skin is warm and dry  Psychiatric: She has a normal mood and affect  Her behavior is normal  Judgment and thought content normal          Results:  Labs:  None     Imaging  Us Thyroid    Result Date: 5/4/2018  Narrative: THYROID ULTRASOUND INDICATION:    E04 1: Nontoxic single thyroid nodule  Was on the frontal and  COMPARISON:  None TECHNIQUE:   Ultrasound of the thyroid was performed with a high frequency linear transducer in transverse and sagittal planes including volumetric imaging sweeps as well as traditional still imaging technique  FINDINGS:  Normal homogeneous smooth echotexture  Right lobe:  4 2 x 0 9 x 0 9 cm  Left lobe:  4 2 x 2 5 x 2 0 cm  Isthmus:  0 6 cm  Nodule #1 Left isthmic and lower pole nodule is difficult to measure accurately and reproducibly  This large asymmetric isthmic nodular thickening is contiguous with similar appearing asymmetric nodular thickening through the left mid and lower pole  On multiple  occasions measured as a separate discrete isthmic nodule but is likely all 1 finding  Overall, this is a chronic long-standing finding and when measured in a similar fashion shows mild gradual interval increase in size, with the isthmic component measured today as 2 6 x 1 2 x 2 3 cm  COMPOSITION:  2 points, solid or almost completely solid   ECHOGENICITY:  1 point, hyperechoic or isoechoic  SHAPE:  0 points, wider-than-tall  MARGIN: 0 points, smooth  ECHOGENIC FOCI:  0 points, none or large comet-tail artifacts  TI-RADS Score: TR 3 (3 points), Mildly suspicious  FNA if >2 5 cm  Follow if >1 5 cm  This lesion was previously sampled in February 2011 which had benign results  Review of multiple prior PET scan shows this to be a hypermetabolic area as far back as  PET scan from 2010    The recent prior outside study is not available for direct review, report was reviewed  If clinically appropriate, this large contiguous lesion could be resampled  Considerations related to the patient's age and/or comorbidities may be used to alter these recommendations  Impression: There is heterogeneous nodular enlargement of the left isthmus, contiguous with the left mid and lower pole region, difficult to accurately reproducibly measure  On multiple prior studies the component within the isthmic region was measured as an individual lesion but likely is inclusive of the remainder of the tissue extending posteriorly into the left lobe  Overall, there has been very gradual interval increase in size over many years  This lesion has been hypermetabolic on multiple prior PET scans and has undergone biopsy sampling in 2011 which was benign  See above  Reference: ACR Thyroid Imaging, Reporting and Data System (TI-RADS): White Paper of the [employer]  J AM Eliel Radiol 5142;25:977-889  (additional recommendations based on American Thyroid Association 2015 guidelines ) Workstation performed: [id number]     Mammo Screening Bilateral W Cad    Result Date: 4/20/2018  Narrative: Patient History: Patient is postmenopausal, has history of other cancer at age 74, has history of endometrial cancer at age 65, had previous chest radiation therapy at age 65, and had previous chemotherapy at age 65  Family history of breast cancer under age 48 in paternal aunt  2 benign excisional biopsies of the left breast, 1985  No Hormone Replacement Therapy Patient has never smoked  Patient's BMI is 26 0  Reason for exam: screening, asymptomatic  Mammo Screening Bilateral W CAD: April 20, 2018 - Check In #: [de-identified] Bilateral CC and MLO view(s) were taken  Technologist: GABRIELLA Childress (GABRIELLA)(M) Prior study comparison: April 11, 2017, mammo screening bilateral W CAD, performed at [address] [place] [address]   February 22, 2016, mammo screening bilateral W CAD, performed at [address] [facility]  There are scattered fibroglandular densities  The parenchymal pattern appears stable  No dominant soft tissue mass or suspicious calcifications are noted  The skin and nipple contours are within normal limits  IMPRESSION:   No mammographic evidence of malignancy  No significant changes when compared with prior studies  ACR BI-RADS® Assessments: BiRad:1 - Negative Recommendation: Routine screening mammogram in 1 year  Analyzed by CAD The patient is scheduled in a reminder system for screening mammography  8-10% of cancers will be missed on mammography  Management of a palpable abnormality must be based on clinical grounds  Patients will be notified of their results via letter from our facility  Accredited by [employer] of Radiology and FDA  Transcription Location: [facility] 143: [id number] Risk Value(s): Tyrer-Cuzick 10 Year: 3 400%, Tyrer-Cuzick Lifetime: 3 400%, Myriad Table: 2 6%, ELVIA 5 Year: 3 0%, NCI Lifetime: 5 0%    Radiology Results    Result Date: 5/1/2018  Narrative: Ordered by an unspecified provider  Radiology Results    THYROID ULTRASOUND     INDICATION:    E04 1: Nontoxic single thyroid nodule  Was on the frontal and       COMPARISON:  None     TECHNIQUE:   Ultrasound of the thyroid was performed with a high frequency linear transducer in transverse and sagittal planes including volumetric imaging sweeps as well as traditional still imaging technique      FINDINGS:  Normal homogeneous smooth echotexture      Right lobe:  4 2 x 0 9 x 0 9 cm  Left lobe:  4 2 x 2 5 x 2 0 cm  Isthmus:  0 6 cm      Nodule #1  Left isthmic and lower pole nodule is difficult to measure accurately and reproducibly  This large asymmetric isthmic nodular thickening is contiguous with similar appearing asymmetric nodular thickening through the left mid and lower pole    On multiple   occasions measured as a separate discrete isthmic nodule but is likely all 1 finding  Overall, this is a chronic long-standing finding and when measured in a similar fashion shows mild gradual interval increase in size, with the isthmic component   measured today as 2 6 x 1 2 x 2 3 cm  COMPOSITION:  2 points, solid or almost completely solid   ECHOGENICITY:  1 point, hyperechoic or isoechoic  SHAPE:  0 points, wider-than-tall  MARGIN: 0 points, smooth  ECHOGENIC FOCI:  0 points, none or large comet-tail artifacts  TI-RADS Score: TR 3 (3 points), Mildly suspicious  FNA if >2 5 cm  Follow if >1 5 cm  This lesion was previously sampled in February 2011 which had benign results  Review of multiple prior PET scan shows this to be a hypermetabolic area as far back as   PET scan from 2010  The recent prior outside study is not available for direct review, report was reviewed  If clinically appropriate, this large contiguous lesion could be resampled  Considerations related to the patient's age and/or comorbidities   may be used to alter these recommendations      IMPRESSION:  There is heterogeneous nodular enlargement of the left isthmus, contiguous with the left mid and lower pole region, difficult to accurately reproducibly measure  On multiple prior studies the component within the isthmic region was measured   as an individual lesion but likely is inclusive of the remainder of the tissue extending posteriorly into the left lobe  Overall, there has been very gradual interval increase in size over many years  This lesion has been hypermetabolic on multiple   prior PET scans and has undergone biopsy sampling in 2011 which was benign  See above  Result Date: 5/1/2018  Narrative: Ordered by an unspecified provider  I reviewed the above laboratory and imaging data  Discussion/Summary:Bilateral thyroid nodules, PET avid  Will set up ultrasound-guided biopsy of any and all nodules bilaterally as needed for workup    She is to undergo radiation therapy in the next month  We will set this up to take place at her convenience after radiation

## 2018-05-04 NOTE — PROGRESS NOTES
Surgical Oncology Follow Up       [address] [facility] SURGICAL [facility]COLOGY [place]  [address] [facility]  [facility]  1940  [phone]  [facility]GICAL [facility]COLOGY [place]  [address] [facility] [address]    Chief Complaint   Patient presents with    Follow-up     follow-up Pet CT       Assessment/Plan:    No problem-specific Assessment & Plan notes found for this encounter  Diagnoses and all orders for this visit:    Thyroid nodule  -     Ambulatory referral to Surgical Oncology  -     US guided thyroid biopsy; Future    Other orders  -     Ubiquinol 100 MG CAPS; Take 1 capsule by mouth  -     DAILY MULTIPLE VITAMINS PO; Take 1 tablet by mouth daily  -     Omega-3 Fatty Acids (EPA) 1000 MG CAPS; Take by mouth  -     Garlic 554 MG TABS; Take by mouth  -     guaiFENesin (MUCINEX) 600 mg 12 hr tablet; Take 1,200 mg by mouth  -     METRONIDAZOLE, TOPICAL, 0 75 % LOTN;   -     omeprazole (PRILOSEC) 20 mg delayed release capsule; Take 20 mg by mouth  -     Probiotic Product (PROBIOTIC-10) CAPS; Take by mouth daily  -     Turmeric, Curcuma Longa, (TURMERIC ROOT) POWD; Take 2 capsules by mouth  -     Ascorbic Acid (VITAMIN C) 500 MG CAPS; Take by mouth  -     Cholecalciferol (VITAMIN D3) 2000 units capsule; Take 1 tablet by mouth daily        Advance Care Planning/Advance Directives:  Discussed and Did not discuss disease status, cancer treatment plans and/or cancer treatment goals with the patient          Malignant tumor of cervix [facility])     Initial Diagnosis     Cervical cancer ([facility] [address] )         1/11/2011 Surgery     ALISTAIR-BSO, pelvic and paraaortic lymph node dissection for presumed endometrial cancer  A  3 2 cm SCC, outer third invasion, microscopically positive posterior margin, 32 negative lymph nodes, negative washings          Chemotherapy     Cisplatin with radiation          - 4/11/2011 Radiation     Whole pelvic radiotherapy Adverse Reaction     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         3/6/2014 Surgery     Right upper lobe resection x 2  A  Recurrent SCC         9/1/2014 Surgery     Thoracotomy and resection of left pulmonary lesions           Diffuse large B cell lymphoma ([facility] [address] )     Initial Diagnosis     Diffuse large B cell lymphoma ([facility] [address] )          - 12/6/2012 Chemotherapy     R-CHOP            History of Present Illness:   Patient is 70-year-old woman here for thyroid nodule follow-up       -Interval History:She has a history of multiple cancers and is presently set to undergo radiation therapy for metastatic lung nodules  She was found on recent PET scan have PET avidity in her thyroid  She had a had a prior biopsy in 2011 which was reportedly benign  We had ordered ultrasound which confirms gradual enlargement in the left thyroid nodule  She is otherwise asymptomatic  She denies a history of radiation exposure to the head or neck area, though she has had radiation to her chest as well as her pelvis  Review of Systems:  Review of Systems   Constitutional: Negative  HENT: Negative  Eyes: Negative  Respiratory: Negative  Cardiovascular: Negative  Gastrointestinal: Negative  Endocrine: Negative  Genitourinary: Negative  Musculoskeletal: Negative  Skin: Negative  Allergic/Immunologic: Negative  Neurological: Negative  Hematological: Negative  Psychiatric/Behavioral: Negative          Patient Active Problem List   Diagnosis    History of DVT (deep vein thrombosis)    Malignant tumor of cervix (HCC)    Diffuse large B cell lymphoma (HCC)    Nontoxic single thyroid nodule    Abdominal pain of multiple sites    Chemotherapy follow-up examination    Compression fracture of third lumbar vertebra with routine healing    Depression    Edema of left lower extremity    Benign essential hypertension    Follicular lymphoma grade I ([place] [address] )    Hypertension    Hypothyroidism    Lymphedema    Metastasis from cervical cancer (HCC)    Nodule of left lung    Non Hodgkin's lymphoma (HCC)    Other diseases of lung, not elsewhere classified    Renal insufficiency    Secondary cancer of lung ([place] [address] )     Past Medical History:   Diagnosis Date    Cervical cancer ([place] [address] ) 01/16/2011    History of DVT (deep vein thrombosis)      No past surgical history on file  No family history on file  Social History     Social History    Marital status: /Civil Union     Spouse name: N/A    Number of children: N/A    Years of education: N/A     Occupational History    Not on file       Social History Main Topics    Smoking status: Never Smoker    Smokeless tobacco: Never Used    Alcohol use No    Drug use: No    Sexual activity: Not on file     Other Topics Concern    Not on file     Social History Narrative    No narrative on file       Current Outpatient Prescriptions:     Ubiquinol 100 MG CAPS, Take 1 capsule by mouth, Disp: , Rfl:     acetaminophen (TYLENOL) 650 mg CR tablet, Take 650 mg by mouth every 8 (eight) hours, Disp: , Rfl:     ascorbic acid (VITAMIN C) 1000 MG tablet, Take 1,000 mg by mouth, Disp: , Rfl:     Ascorbic Acid (VITAMIN C) 500 MG CAPS, Take by mouth, Disp: , Rfl:     aspirin (ASPIR-81) 81 mg EC tablet, Take by mouth, Disp: , Rfl:     Cholecalciferol (VITAMIN D3) 2000 units capsule, Take 1 tablet by mouth daily, Disp: , Rfl:     DAILY MULTIPLE VITAMINS PO, Take 1 tablet by mouth daily, Disp: , Rfl:     diazepam (VALIUM) 5 mg tablet, Take 2 5 mg by mouth every 6 (six) hours as needed for anxiety, Disp: , Rfl:     dicyclomine (BENTYL) 10 mg capsule, Take 1 capsule by mouth, Disp: , Rfl:     enoxaparin (LOVENOX) 40 mg/0 4 mL, Take 1 injection the morning your flight departs, Disp: 2 Syringe, Rfl: 0    Garlic 485 MG TABS, Take by mouth, Disp: , Rfl:     guaiFENesin (MUCINEX) 600 mg 12 hr tablet, Take 1,200 mg by mouth, Disp: , Rfl:     lisinopril (ZESTRIL) 2 5 mg tablet, Take 2 5 mg by mouth daily, Disp: , Rfl: 3    METRONIDAZOLE, TOPICAL, 0 75 % LOTN, , Disp: , Rfl:     Omega-3 Fatty Acids (EPA) 1000 MG CAPS, Take by mouth, Disp: , Rfl:     omeprazole (PRILOSEC) 20 mg delayed release capsule, Take 20 mg by mouth, Disp: , Rfl:     Probiotic Product (ALIGN PO), Take 1 capsule by mouth, Disp: , Rfl:     Probiotic Product (PROBIOTIC-10) CAPS, Take by mouth daily, Disp: , Rfl:     SYNTHROID 50 MCG tablet, , Disp: , Rfl:     Turmeric, Curcuma Longa, (TURMERIC ROOT) POWD, Take 2 capsules by mouth, Disp: , Rfl:   Allergies   Allergen Reactions    Other Other (See Comments)     BAND AIDES RASH    Pollen Extract Allergic Rhinitis    Sulfa Antibiotics Rash     Vitals:    05/04/18 1058   BP: 144/62   Pulse: 70   Temp: 98 7 °F (37 1 °C)       Physical Exam   Constitutional: She is oriented to person, place, and time  She appears well-developed and well-nourished  HENT:   Head: Normocephalic and atraumatic  Right Ear: External ear normal    Left Ear: External ear normal    Eyes: EOM are normal  Pupils are equal, round, and reactive to light  Neck: Normal range of motion  Neck supple  Cardiovascular: Normal rate, regular rhythm, normal heart sounds and intact distal pulses  Pulmonary/Chest: Effort normal and breath sounds normal    Abdominal: Soft  Bowel sounds are normal    Musculoskeletal: Normal range of motion  Neurological: She is alert and oriented to person, place, and time  Skin: Skin is warm and dry  Psychiatric: She has a normal mood and affect  Her behavior is normal  Judgment and thought content normal          Results:  Labs:  None     Imaging  Us Thyroid    Result Date: 5/4/2018  Narrative: THYROID ULTRASOUND INDICATION:    E04 1: Nontoxic single thyroid nodule      Was on the frontal and  COMPARISON:  None TECHNIQUE:   Ultrasound of the thyroid was performed with a high frequency linear transducer in transverse and sagittal planes including volumetric imaging sweeps as well as traditional still imaging technique  FINDINGS:  Normal homogeneous smooth echotexture  Right lobe:  4 2 x 0 9 x 0 9 cm  Left lobe:  4 2 x 2 5 x 2 0 cm  Isthmus:  0 6 cm  Nodule #1 Left isthmic and lower pole nodule is difficult to measure accurately and reproducibly  This large asymmetric isthmic nodular thickening is contiguous with similar appearing asymmetric nodular thickening through the left mid and lower pole  On multiple  occasions measured as a separate discrete isthmic nodule but is likely all 1 finding  Overall, this is a chronic long-standing finding and when measured in a similar fashion shows mild gradual interval increase in size, with the isthmic component measured today as 2 6 x 1 2 x 2 3 cm  COMPOSITION:  2 points, solid or almost completely solid   ECHOGENICITY:  1 point, hyperechoic or isoechoic  SHAPE:  0 points, wider-than-tall  MARGIN: 0 points, smooth  ECHOGENIC FOCI:  0 points, none or large comet-tail artifacts  TI-RADS Score: TR 3 (3 points), Mildly suspicious  FNA if >2 5 cm  Follow if >1 5 cm  This lesion was previously sampled in February 2011 which had benign results  Review of multiple prior PET scan shows this to be a hypermetabolic area as far back as  PET scan from 2010  The recent prior outside study is not available for direct review, report was reviewed  If clinically appropriate, this large contiguous lesion could be resampled  Considerations related to the patient's age and/or comorbidities may be used to alter these recommendations  Impression: There is heterogeneous nodular enlargement of the left isthmus, contiguous with the left mid and lower pole region, difficult to accurately reproducibly measure    On multiple prior studies the component within the isthmic region was measured as an individual lesion but likely is inclusive of the remainder of the tissue extending posteriorly into the left lobe  Overall, there has been very gradual interval increase in size over many years  This lesion has been hypermetabolic on multiple prior PET scans and has undergone biopsy sampling in 2011 which was benign  See above  Reference: ACR Thyroid Imaging, Reporting and Data System (TI-RADS): White Paper of the [employer]  J AM Eliel Radiol 2654;13:483-957  (additional recommendations based on American Thyroid Association 2015 guidelines ) Workstation performed: [id number]     Mammo Screening Bilateral W Cad    Result Date: 4/20/2018  Narrative: Patient History: Patient is postmenopausal, has history of other cancer at age 74, has history of endometrial cancer at age 65, had previous chest radiation therapy at age 65, and had previous chemotherapy at age 65  Family history of breast cancer under age 48 in paternal aunt  2 benign excisional biopsies of the left breast, 1985  No Hormone Replacement Therapy Patient has never smoked  Patient's BMI is 26 0  Reason for exam: screening, asymptomatic  Mammo Screening Bilateral W CAD: April 20, 2018 - Check In #: [de-identified] Bilateral CC and MLO view(s) were taken  Technologist: GABRIELLA Messina (R)(M) Prior study comparison: April 11, 2017, mammo screening bilateral W CAD, performed at [address]  February 22, 2016, mammo screening bilateral W CAD, performed at [address]  There are scattered fibroglandular densities  The parenchymal pattern appears stable  No dominant soft tissue mass or suspicious calcifications are noted  The skin and nipple contours are within normal limits  IMPRESSION:   No mammographic evidence of malignancy  No significant changes when compared with prior studies  ACR BI-RADS® Assessments: BiRad:1 - Negative Recommendation: Routine screening mammogram in 1 year  Analyzed by CAD The patient is scheduled in a reminder system for screening mammography  8-10% of cancers will be missed on mammography  Management of a palpable abnormality must be based on clinical grounds  Patients will be notified of their results via letter from our facility  Accredited by [employer] of Radiology and FDA  Transcription Location: [address] [place] [address]: [id number] Risk Value(s): Tyrer-Cuzick 10 Year: 3 400%, Tyrer-Cuzick Lifetime: 3 400%, Myriad Table: 2 6%, ELVIA 5 Year: 3 0%, NCI Lifetime: 5 0%    Radiology Results    Result Date: 5/1/2018  Narrative: Ordered by an unspecified provider  Radiology Results    THYROID ULTRASOUND     INDICATION:    E04 1: Nontoxic single thyroid nodule  Was on the frontal and       COMPARISON:  None     TECHNIQUE:   Ultrasound of the thyroid was performed with a high frequency linear transducer in transverse and sagittal planes including volumetric imaging sweeps as well as traditional still imaging technique      FINDINGS:  Normal homogeneous smooth echotexture      Right lobe:  4 2 x 0 9 x 0 9 cm  Left lobe:  4 2 x 2 5 x 2 0 cm  Isthmus:  0 6 cm      Nodule #1  Left isthmic and lower pole nodule is difficult to measure accurately and reproducibly  This large asymmetric isthmic nodular thickening is contiguous with similar appearing asymmetric nodular thickening through the left mid and lower pole  On multiple   occasions measured as a separate discrete isthmic nodule but is likely all 1 finding  Overall, this is a chronic long-standing finding and when measured in a similar fashion shows mild gradual interval increase in size, with the isthmic component   measured today as 2 6 x 1 2 x 2 3 cm  COMPOSITION:  2 points, solid or almost completely solid   ECHOGENICITY:  1 point, hyperechoic or isoechoic  SHAPE:  0 points, wider-than-tall  MARGIN: 0 points, smooth  ECHOGENIC FOCI:  0 points, none or large comet-tail artifacts  TI-RADS Score: TR 3 (3 points), Mildly suspicious  FNA if >2 5 cm  Follow if >1 5 cm    This lesion was previously sampled in February 2011 which had benign results  Review of multiple prior PET scan shows this to be a hypermetabolic area as far back as   PET scan from 2010  The recent prior outside study is not available for direct review, report was reviewed  If clinically appropriate, this large contiguous lesion could be resampled  Considerations related to the patient's age and/or comorbidities   may be used to alter these recommendations      IMPRESSION:  There is heterogeneous nodular enlargement of the left isthmus, contiguous with the left mid and lower pole region, difficult to accurately reproducibly measure  On multiple prior studies the component within the isthmic region was measured   as an individual lesion but likely is inclusive of the remainder of the tissue extending posteriorly into the left lobe  Overall, there has been very gradual interval increase in size over many years  This lesion has been hypermetabolic on multiple   prior PET scans and has undergone biopsy sampling in 2011 which was benign  See above  Result Date: 5/1/2018  Narrative: Ordered by an unspecified provider  I reviewed the above laboratory and imaging data  Discussion/Summary:Bilateral thyroid nodules, PET avid  Will set up ultrasound-guided biopsy of any and all nodules bilaterally as needed for workup  She is to undergo radiation therapy in the next month  We will set this up to take place at her convenience after radiation

## 2018-05-30 DIAGNOSIS — C53.9 MALIGNANT NEOPLASM OF CERVIX UTERI (HCC): ICD-10-CM

## 2018-06-06 DIAGNOSIS — C53.9 MALIGNANT NEOPLASM OF CERVIX UTERI (HCC): ICD-10-CM

## 2018-08-29 ENCOUNTER — OFFICE VISIT (OUTPATIENT)
Dept: GYNECOLOGIC ONCOLOGY | Facility: HOSPITAL | Age: 78
End: 2018-08-29
Payer: MEDICARE

## 2018-08-29 VITALS
RESPIRATION RATE: 14 BRPM | BODY MASS INDEX: 27 KG/M2 | DIASTOLIC BLOOD PRESSURE: 80 MMHG | HEART RATE: 64 BPM | SYSTOLIC BLOOD PRESSURE: 138 MMHG | TEMPERATURE: 97.5 F | WEIGHT: 143 LBS | HEIGHT: 61 IN

## 2018-08-29 DIAGNOSIS — C53.9 MALIGNANT NEOPLASM OF CERVIX, UNSPECIFIED SITE (HCC): ICD-10-CM

## 2018-08-29 DIAGNOSIS — C78.00 MALIGNANT NEOPLASM METASTATIC TO LUNG, UNSPECIFIED LATERALITY (HCC): Primary | ICD-10-CM

## 2018-08-29 DIAGNOSIS — I89.0 LYMPHEDEMA: ICD-10-CM

## 2018-08-29 DIAGNOSIS — E04.1 NONTOXIC SINGLE THYROID NODULE: ICD-10-CM

## 2018-08-29 PROCEDURE — 99214 OFFICE O/P EST MOD 30 MIN: CPT | Performed by: OBSTETRICS & GYNECOLOGY

## 2018-08-29 NOTE — ASSESSMENT & PLAN NOTE
17-year-old with recurrent cervical cancer, pulmonary metastases who is recently status post palliative radiation therapy to her left lung  This completed in June of 2018  Her performance status is 0   1  Follow-up results of PET-CT scan planned for September of 2018  2   Return in 6 months for cervical cancer surveillance  I spent 25 minutes with the patient  More than half the time was spent in counseling and coordination of care regarding  Surveillance and imaging of recurrent cervical cancer

## 2018-08-29 NOTE — ASSESSMENT & PLAN NOTE
Lymphedema secondary to pelvic lymphadenectomy and radiation therapy  This has improved with increased ambulation and Flakito hose  1  Continue current therapy plan

## 2018-08-29 NOTE — PROGRESS NOTES
Assessment/Plan:    Problem List Items Addressed This Visit        Endocrine    Nontoxic single thyroid nodule       If PET-CT scan demonstrates continued uptake in the thyroid nodule, will recommend biopsy  Respiratory    [facility] ([facility] ) - Primary       Genitourinary    Malignant tumor of cervix [facility])       76year-old with recurrent cervical cancer, pulmonary metastases who is recently status post palliative radiation therapy to her left lung  This completed in June of 2018  Her performance status is 0   1  Follow-up results of PET-CT scan planned for September of 2018  2   Return in 6 months for cervical cancer surveillance  I spent 25 minutes with the patient  More than half the time was spent in counseling and coordination of care regarding  Surveillance and imaging of recurrent cervical cancer  Other    Lymphedema       Lymphedema secondary to pelvic lymphadenectomy and radiation therapy  This has improved with increased ambulation and Flakito hose  1  Continue current therapy plan  CHIEF COMPLAINT:  Cervical cancer surveillance      Problem:  Cancer Staging  Malignant tumor of cervix ([place] [address] )  Staging form: Cervix Uteri, AJCC 8th Edition  - Clinical: FIGO Stage IB1 (cT1b1, cM0) - Signed by Danny Camara PA-C on 4/25/2018        Previous therapy:     Malignant tumor of cervix [facility])     Initial Diagnosis     Cervical cancer ([facility] )         1/11/2011 Surgery     ALISTAIR-BSO, pelvic and paraaortic lymph node dissection for presumed endometrial cancer  A  3 2 cm SCC, outer third invasion, microscopically positive posterior margin, 32 negative lymph nodes, negative washings          Chemotherapy     Cisplatin with radiation          - 4/11/2011 Radiation     Whole pelvic radiotherapy          Adverse Reaction     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         3/6/2014 Surgery     Right upper lobe resection x 2  A  Recurrent SCC         9/1/2014 Surgery     Thoracotomy and resection of left pulmonary lesions          - 6/2018 Radiation     50Gy to left lung recurrence           Diffuse large B cell lymphoma ([facility] [address] )     Initial Diagnosis     Diffuse large B cell lymphoma ([facility] [address] )          - 12/6/2012 Chemotherapy     R-CHOP              Patient ID: Sheri Campbell is a 76 y o  female   61-year-old returns for cervical cancer surveillance  She has recently completed radiation therapy to a left lung lesion  She has a follow-up PET-CT scan planned for September of 2018  She has also been evaluated for a thyroid nodule  This had uptake on her pre radiation PET scan  She is weighing the risks and benefits of potential   Thyroid biopsy  She has recently taken a trip to [facility] and the extensive walking has improved her left lower extremity lymphedema  She continues with Flakito hose and wraps  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  This controls her symptoms well  Overall, her quality of life is good  No vaginal bleeding  No pelvic pain          The following portions of the patient's history were reviewed and updated as appropriate: allergies, current medications, past family history, past medical history, past social history, past surgical history and problem list     Review of Systems    Current Outpatient Prescriptions   Medication Sig Dispense Refill    acetaminophen (TYLENOL) 650 mg CR tablet Take 650 mg by mouth every 8 (eight) hours      ascorbic acid (VITAMIN C) 1000 MG tablet Take 1,000 mg by mouth      Ascorbic Acid (VITAMIN C) 500 MG CAPS Take by mouth      aspirin (ASPIR-81) 81 mg EC tablet Take by mouth      Cholecalciferol (VITAMIN D3) 2000 units capsule Take 1 tablet by mouth daily      DAILY MULTIPLE VITAMINS PO Take 1 tablet by mouth daily      diazepam (VALIUM) 5 mg tablet Take 2 5 mg by mouth every 6 (six) hours as needed for anxiety      dicyclomine (BENTYL) 10 mg capsule Take 1 capsule by mouth      enoxaparin (LOVENOX) 40 mg/0 4 mL Take 1 injection the morning your flight departs 2 Syringe 0    Garlic 689 MG TABS Take by mouth      guaiFENesin (MUCINEX) 600 mg 12 hr tablet Take 1,200 mg by mouth      lisinopril (ZESTRIL) 2 5 mg tablet Take 2 5 mg by mouth daily  3    METRONIDAZOLE, TOPICAL, 0 75 % LOTN       Omega-3 Fatty Acids (EPA) 1000 MG CAPS Take by mouth      omeprazole (PRILOSEC) 20 mg delayed release capsule Take 20 mg by mouth      Probiotic Product (ALIGN PO) Take 1 capsule by mouth      Probiotic Product (PROBIOTIC-10) CAPS Take by mouth daily      SYNTHROID 50 MCG tablet       Ubiquinol 100 MG CAPS Take 1 capsule by mouth      Turmeric, Curcuma Longa, (TURMERIC ROOT) POWD Take 2 capsules by mouth       No current facility-administered medications for this visit  Objective:    Blood pressure 138/80, pulse 64, temperature 97 5 °F (36 4 °C), temperature source Oral, resp  rate 14, height 5' 0 5" (1 537 m), weight 64 9 kg (143 lb)  Body mass index is 27 47 kg/m²  Body surface area is 1 63 meters squared  Physical Exam   Constitutional: She is oriented to person, place, and time  She appears well-developed and well-nourished  No distress  Pulmonary/Chest: Effort normal    Genitourinary:   Genitourinary Comments:   Deferred   Neurological: She is alert and oriented to person, place, and time  Skin: She is not diaphoretic  Psychiatric: She has a normal mood and affect   Her behavior is normal  Judgment and thought content normal

## 2018-11-07 DIAGNOSIS — C53.9 MALIGNANT NEOPLASM OF CERVIX, UNSPECIFIED SITE (HCC): Primary | ICD-10-CM

## 2019-01-02 ENCOUNTER — TELEPHONE (OUTPATIENT)
Dept: GYNECOLOGIC ONCOLOGY | Facility: CLINIC | Age: 79
End: 2019-01-02

## 2019-01-02 NOTE — TELEPHONE ENCOUNTER
I SPOKE TO THIS PATIENT TO RESCHEDULE HER APPOINTMENT WITH DR Angela Maya, DURING OUR CONVERSATION SHE MENTION THAT SHE HAD SPOKE TO [place] [address] IN THE PAST ABOUT HER PET CT RESULTS, SHE WAS CONCERNED WITH A NODULE ON HER THYROID AND HAS DECIDED SHE WOULD LIKE TO PROCEED WITH AN ULTRASOUND OF THE THYROID  SHE IS A PATIENT OF DR Yanet Bruno  AND WASN'T SURE WHO SHE SHOULD CONTACT REGARDING THIS NEW PLAN  I TOLD HER I WOULD RELAY THE MESSAGE TO DR Yanet Bruno AND HIS NURSE FAUSTINO  SHE ASKED THAT BALTA JUST BE INFORMED OF HER PLAN  PLEASE CONTACT THE PATIENT ONCE ORDERS ARE PLACED  THANK YOU!

## 2019-01-04 ENCOUNTER — TELEPHONE (OUTPATIENT)
Dept: SURGICAL ONCOLOGY | Facility: CLINIC | Age: 79
End: 2019-01-04

## 2019-01-07 ENCOUNTER — TELEPHONE (OUTPATIENT)
Dept: SURGICAL ONCOLOGY | Facility: CLINIC | Age: 79
End: 2019-01-07

## 2019-01-08 ENCOUNTER — TELEPHONE (OUTPATIENT)
Dept: SURGICAL ONCOLOGY | Facility: CLINIC | Age: 79
End: 2019-01-08

## 2019-01-14 DIAGNOSIS — I89.0 LYMPHEDEMA: Primary | ICD-10-CM

## 2019-01-15 ENCOUNTER — HOSPITAL ENCOUNTER (OUTPATIENT)
Dept: ULTRASOUND IMAGING | Facility: MEDICAL CENTER | Age: 79
Discharge: HOME | End: 2019-01-15
Payer: MEDICARE

## 2019-01-15 DIAGNOSIS — E04.1 NONTOXIC SINGLE THYROID NODULE: ICD-10-CM

## 2019-01-15 PROCEDURE — 76536 US EXAM OF HEAD AND NECK: CPT

## 2019-01-17 RX ORDER — CEPHALEXIN 500 MG/1
CAPSULE ORAL
COMMUNITY
Start: 2018-12-20 | End: 2019-01-18 | Stop reason: HOSPADM

## 2019-01-18 ENCOUNTER — OFFICE VISIT (OUTPATIENT)
Dept: SURGICAL ONCOLOGY | Facility: CLINIC | Age: 79
End: 2019-01-18
Payer: MEDICARE

## 2019-01-18 VITALS
HEART RATE: 88 BPM | DIASTOLIC BLOOD PRESSURE: 80 MMHG | HEIGHT: 61 IN | TEMPERATURE: 97.8 F | WEIGHT: 137 LBS | RESPIRATION RATE: 16 BRPM | BODY MASS INDEX: 25.86 KG/M2 | SYSTOLIC BLOOD PRESSURE: 140 MMHG

## 2019-01-18 DIAGNOSIS — E04.1 NONTOXIC SINGLE THYROID NODULE: Primary | ICD-10-CM

## 2019-01-18 PROCEDURE — 99213 OFFICE O/P EST LOW 20 MIN: CPT | Performed by: SURGERY

## 2019-01-18 RX ORDER — GLUTATHIONE 100 %
POWDER (GRAM) MISCELLANEOUS DAILY
COMMUNITY

## 2019-01-18 RX ORDER — CHLORAL HYDRATE 500 MG
1000 CAPSULE ORAL DAILY
COMMUNITY

## 2019-01-18 NOTE — PROGRESS NOTES
Surgical Oncology Follow Up       [address] [facility] [place]  [address] [facility]  1940  [id number]  [facility] [place]  [address] [address]    Chief Complaint   Patient presents with    Follow-up     Patient is here for follow up after thyroid ultrasound  Assessment/Plan:    No problem-specific Assessment & Plan notes found for this encounter  Diagnoses and all orders for this visit:    Nontoxic single thyroid nodule  -     US thyroid; Future    Other orders  -     Discontinue: cephalexin (KEFLEX) 500 mg capsule; Take 1 (one) capsule the morning of stent removal and take remaining capsule after stent removal   -     Discontinue: Omega-3 Fatty Acids (FISH OIL PO); Take 2 g by mouth  -     Glutathione POWD; by Does not apply route  -     Omega-3 Fatty Acids (FISH OIL) 1,000 mg; Take 1,000 mg by mouth daily  -     TURMERIC CURCUMIN PO; Take by mouth  -     CALCIUM-MAGNESIUM-VITAMIN D PO; Take by mouth        Advance Care Planning/Advance Directives:  Discussed disease status, cancer treatment plans and/or cancer treatment goals with the patient  Malignant tumor of cervix [facility])     Initial Diagnosis     Cervical cancer ([facility] [address] )         1/11/2011 Surgery     ALISTAIR-BSO, pelvic and paraaortic lymph node dissection for presumed endometrial cancer  A  3 2 cm SCC, outer third invasion, microscopically positive posterior margin, 32 negative lymph nodes, negative washings          Chemotherapy     Cisplatin with radiation          - 4/11/2011 Radiation     Whole pelvic radiotherapy          Adverse Reaction     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         3/6/2014 Surgery     Right upper lobe resection x 2  A   Recurrent SCC         9/1/2014 Surgery     Thoracotomy and resection of left pulmonary lesions - 6/2018 Radiation     50Gy to left lung recurrence           Diffuse large B cell lymphoma ([facility] [address] )     Initial Diagnosis     Diffuse large B cell lymphoma ([facility] [address] )          - 12/6/2012 Chemotherapy     R-CHOP            History of Present Illness:  Patient is an 39-year-old woman here for follow-up  We have been seeing her on an annual basis for thyroid/nodule surveillance      -Interval History:   She is asymptomatic and has had no endocrine or thyroid issues since her last visit  She had an ultrasound done in anticipation of today's visit  Review of Systems:  Review of Systems   Constitutional: Negative  HENT: Negative  Eyes: Negative  Respiratory: Negative  Cardiovascular: Negative  Gastrointestinal: Negative  Endocrine: Negative  Genitourinary: Negative  Musculoskeletal: Negative  Skin: Negative  Allergic/Immunologic: Negative  Neurological: Negative  Hematological: Negative  Psychiatric/Behavioral: Negative  Patient Active Problem List   Diagnosis    History of DVT (deep vein thrombosis)    Malignant tumor of cervix (HCC)    Diffuse large B cell lymphoma (HCC)    Nontoxic single thyroid nodule    Abdominal pain of multiple sites    Chemotherapy follow-up examination    Compression fracture of third lumbar vertebra with routine healing    Depression    Edema of left lower extremity    Benign essential hypertension    Hypertension    Hypothyroidism    Lymphedema    Nodule of left lung    Other diseases of lung, not elsewhere classified    Renal insufficiency     Past Medical History:   Diagnosis Date    [facility] 01/16/2011    History of DVT (deep vein thrombosis)      No past surgical history on file  No family history on file  Social History     Social History    Marital status: /Civil Union     Spouse name: N/A    Number of children: N/A    Years of education: N/A     Occupational History    Not on file       Social History Main Topics    Smoking status: Never Smoker    Smokeless tobacco: Never Used    Alcohol use No    Drug use: No    Sexual activity: Not on file     Other Topics Concern    Not on file     Social History Narrative    No narrative on file       Current Outpatient Prescriptions:     acetaminophen (TYLENOL) 650 mg CR tablet, Take 650 mg by mouth every 8 (eight) hours, Disp: , Rfl:     ascorbic acid (VITAMIN C) 1000 MG tablet, Take 1,000 mg by mouth, Disp: , Rfl:     aspirin (ASPIR-81) 81 mg EC tablet, Take by mouth, Disp: , Rfl:     CALCIUM-MAGNESIUM-VITAMIN D PO, Take by mouth, Disp: , Rfl:     Cholecalciferol (VITAMIN D3) 2000 units capsule, Take 1 tablet by mouth daily, Disp: , Rfl:     DAILY MULTIPLE VITAMINS PO, Take 1 tablet by mouth daily, Disp: , Rfl:     diazepam (VALIUM) 5 mg tablet, Take 2 5 mg by mouth every 6 (six) hours as needed for anxiety, Disp: , Rfl:     dicyclomine (BENTYL) 10 mg capsule, Take 1 capsule by mouth, Disp: , Rfl:     Garlic 988 MG TABS, Take by mouth, Disp: , Rfl:     Glutathione POWD, by Does not apply route, Disp: , Rfl:     guaiFENesin (MUCINEX) 600 mg 12 hr tablet, Take 1,200 mg by mouth, Disp: , Rfl:     lisinopril (ZESTRIL) 2 5 mg tablet, Take 2 5 mg by mouth daily, Disp: , Rfl: 3    Omega-3 Fatty Acids (FISH OIL) 1,000 mg, Take 1,000 mg by mouth daily, Disp: , Rfl:     omeprazole (PRILOSEC) 20 mg delayed release capsule, Take 20 mg by mouth, Disp: , Rfl:     Probiotic Product (PROBIOTIC-10) CAPS, Take by mouth daily, Disp: , Rfl:     SYNTHROID 50 MCG tablet, , Disp: , Rfl:     TURMERIC CURCUMIN PO, Take by mouth, Disp: , Rfl:     Ubiquinol 100 MG CAPS, Take 1 capsule by mouth, Disp: , Rfl:   Allergies   Allergen Reactions    Other Other (See Comments)     BAND AIDES RASH    Pollen Extract Allergic Rhinitis    Sulfa Antibiotics Rash     Vitals:    01/18/19 1449   BP: 140/80   Pulse: 88   Resp: 16   Temp: 97 8 °F (36 6 °C)       Physical Exam Constitutional: She is oriented to person, place, and time  She appears well-developed and well-nourished  HENT:   Head: Normocephalic and atraumatic  Right Ear: External ear normal    Left Ear: External ear normal    Eyes: Pupils are equal, round, and reactive to light  Conjunctivae are normal    Neck: Normal range of motion  Neck supple  No JVD present  No tracheal deviation present  No thyromegaly present  Cardiovascular: Normal rate, regular rhythm, normal heart sounds and intact distal pulses  Pulmonary/Chest: Effort normal and breath sounds normal  No stridor  Abdominal: Soft  Bowel sounds are normal    Lymphadenopathy:     She has no cervical adenopathy  Neurological: She is alert and oriented to person, place, and time  Skin: Skin is warm and dry  Psychiatric: She has a normal mood and affect  Her behavior is normal  Judgment and thought content normal          Results:  Labs:  none    Imaging  Us Thyroid    Result Date: 1/17/2019  Narrative: THYROID ULTRASOUND INDICATION:    Follow-up nodule  COMPARISON:  5/3/2018 TECHNIQUE:   Ultrasound of the thyroid was performed with a high frequency linear transducer in transverse and sagittal planes including volumetric imaging sweeps as well as traditional still imaging technique  FINDINGS:  Thyroid parenchyma is diffusely heterogeneous in echotexture  Right lobe:  3 4 x 1 7 x 0 9 cm  Left lobe:  5 3 x 2 4 x 1 9 cm  Isthmus:  0 4 cm  Stable heterogeneity extending from the left isthmus into the left lobe again present without conclusive discrete nodule  Impression: Stable heterogeneity extending from the isthmus into the left lobe of the thyroid  Reference: ACR Thyroid Imaging, Reporting and Data System (TI-RADS): White Paper of the [employer]   J AM Eliel Radiol 2586;11:351-456  (additional recommendations based on American Thyroid Association 2015 guidelines ) Workstation performed: [id number]     I reviewed the above laboratory and imaging data  Discussion/Summary:  Incidentally found right thyroid nodule/fullness  Workup has been stable/negative so far  She will continue cervical cancer surveillance with Gyne onc team/Dr Jermaine Talbot  Will plan on seeing her in 1 year after her annual surveillance ultrasound has been completed  No evidence of mass or malignancy at this time

## 2019-03-06 ENCOUNTER — OFFICE VISIT (OUTPATIENT)
Dept: GYNECOLOGIC ONCOLOGY | Facility: HOSPITAL | Age: 79
End: 2019-03-06
Payer: MEDICARE

## 2019-03-06 VITALS
RESPIRATION RATE: 16 BRPM | BODY MASS INDEX: 26.06 KG/M2 | HEART RATE: 66 BPM | HEIGHT: 61 IN | TEMPERATURE: 97.9 F | DIASTOLIC BLOOD PRESSURE: 80 MMHG | SYSTOLIC BLOOD PRESSURE: 140 MMHG | WEIGHT: 138 LBS

## 2019-03-06 DIAGNOSIS — Z08 ENCOUNTER FOR FOLLOW-UP SURVEILLANCE OF CERVICAL CANCER: Primary | ICD-10-CM

## 2019-03-06 DIAGNOSIS — Z85.41 ENCOUNTER FOR FOLLOW-UP SURVEILLANCE OF CERVICAL CANCER: Primary | ICD-10-CM

## 2019-03-06 DIAGNOSIS — Z85.41 HISTORY OF CERVICAL CANCER: ICD-10-CM

## 2019-03-06 PROCEDURE — 99212 OFFICE O/P EST SF 10 MIN: CPT | Performed by: OBSTETRICS & GYNECOLOGY

## 2019-03-06 NOTE — PROGRESS NOTES
Assessment/Plan:    Problem List Items Addressed This Visit        Genitourinary    Encounter for follow-up surveillance of cervical cancer - Primary       Other    History of cervical cancer     49-year-old with a history of recurrent stage I B1 cervical cancer with pulmonary metastases  She has moderate right hydronephrosis and is planned to undergo stent placement  As of her last PET-CT scan in September of 2018, there was no visible evidence of disease recurrence  She is clinically without evidence of disease  Her performance status is 0    1  Return in 6 months for cervical cancer surveillance  CHIEF COMPLAINT:  Cervical cancer surveillance      Problem:  Cancer Staging  History of cervical cancer  Staging form: Cervix Uteri, AJCC 8th Edition  - Clinical: FIGO Stage IB1 (cT1b1, cM0) - Signed by Stevie Eli PA-C on 4/25/2018        Previous therapy:     History of cervical cancer     Initial Diagnosis     Cervical cancer ([facility] [address] )         1/11/2011 Surgery     ALISTAIR-BSO, pelvic and paraaortic lymph node dissection for presumed endometrial cancer  A  3 2 cm SCC, outer third invasion, microscopically positive posterior margin, 32 negative lymph nodes, negative washings          Chemotherapy     Cisplatin with radiation          - 4/11/2011 Radiation     Whole pelvic radiotherapy          Adverse Reaction     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         3/6/2014 Surgery     Right upper lobe resection x 2  A  Recurrent SCC         9/1/2014 Surgery     Thoracotomy and resection of left pulmonary lesions          - 6/2018 Radiation     50Gy to left lung recurrence           Diffuse large B cell lymphoma ([facility] [address] )     Initial Diagnosis     Diffuse large B cell lymphoma ([facility] [address] )          - 12/6/2012 Chemotherapy     R-CHOP              Patient ID: Obdulio anderson a [de-identified] y o  female  49-year-old returns for cervical cancer surveillance    Her most recent PET-CT scan in September of 2018 was without evidence of visible disease  Right hydronephrosis was identified  She recently underwent a Lasix renal scan and evaluation by Urology and has a plan for right ureteral stent placement  She occasionally feels some rectal discomfort and pressure  No vaginal bleeding  No other interval change in her medications or medical history since her last visit  The following portions of the patient's history were reviewed and updated as appropriate: allergies, current medications, past family history, past medical history, past social history, past surgical history and problem list     Review of Systems   Constitutional: Negative for activity change and unexpected weight change  HENT: Negative  Eyes: Negative  Respiratory: Negative  Cardiovascular: Positive for leg swelling  Gastrointestinal: Positive for rectal pain  Negative for abdominal distention and abdominal pain  Endocrine: Negative  Genitourinary: Negative for pelvic pain and vaginal bleeding  Musculoskeletal: Negative  Skin: Negative  Allergic/Immunologic: Negative  Neurological: Negative  Hematological: Negative  Psychiatric/Behavioral: Negative          Current Outpatient Medications   Medication Sig Dispense Refill    acetaminophen (TYLENOL) 650 mg CR tablet Take 650 mg by mouth every 8 (eight) hours      ascorbic acid (VITAMIN C) 1000 MG tablet Take 1,000 mg by mouth      aspirin (ASPIR-81) 81 mg EC tablet Take by mouth      CALCIUM-MAGNESIUM-VITAMIN D PO Take by mouth      Cholecalciferol (VITAMIN D3) 2000 units capsule Take 1 tablet by mouth daily      DAILY MULTIPLE VITAMINS PO Take 1 tablet by mouth daily      diazepam (VALIUM) 5 mg tablet Take 2 5 mg by mouth every 6 (six) hours as needed for anxiety      dicyclomine (BENTYL) 10 mg capsule Take 1 capsule by mouth      Garlic 816 MG TABS Take by mouth      Glutathione POWD by Does not apply route      guaiFENesin (MUCINEX) 600 mg 12 hr tablet Take 1,200 mg by mouth      lisinopril (ZESTRIL) 2 5 mg tablet Take 2 5 mg by mouth daily  3    Omega-3 Fatty Acids (FISH OIL) 1,000 mg Take 1,000 mg by mouth daily      omeprazole (PRILOSEC) 20 mg delayed release capsule Take 20 mg by mouth      Probiotic Product (PROBIOTIC-10) CAPS Take by mouth daily      SYNTHROID 50 MCG tablet       TURMERIC CURCUMIN PO Take by mouth      Ubiquinol 100 MG CAPS Take 1 capsule by mouth       No current facility-administered medications for this visit  Objective:    Blood pressure 140/80, pulse 66, temperature 97 9 °F (36 6 °C), temperature source Oral, resp  rate 16, height 5' 0 5" (1 537 m), weight 62 6 kg (138 lb)  Body mass index is 26 51 kg/m²  Body surface area is 1 6 meters squared  Physical Exam   Constitutional: She is oriented to person, place, and time  She appears well-developed and well-nourished  No distress  HENT:   Head: Normocephalic and atraumatic  Neck: Normal range of motion  Neck supple  No thyromegaly present  Abdominal: Soft  She exhibits no distension and no mass  There is no tenderness  There is no rebound and no guarding  Genitourinary:   Genitourinary Comments: The external female genitalia is normal  The bartholin's, uretheral and skenes glands are normal  The urethral meatus is normal (midline with no lesions)  Anus without fissure or lesion  Speculum exam reveals a vagina with extensive radiation change  No masses, lesions,discharge or bleeding  No significant cystocele or rectocele noted  Bimanual exam notes a surgical absent cervix, uterus and adnexal structures  There is diffuse pelvic thickening  No discrete masses identified  Rectal examination without masses or lesions  Bladder is without fullness, mass or tenderness  Musculoskeletal: She exhibits no edema  Lymphadenopathy:     She has no cervical adenopathy     Neurological: She is alert and oriented to person, place, and time    Skin: Skin is warm and dry  She is not diaphoretic  Psychiatric: She has a normal mood and affect   Her behavior is normal  Judgment and thought content normal

## 2019-03-06 NOTE — ASSESSMENT & PLAN NOTE
47-year-old with a history of recurrent stage I B1 cervical cancer with pulmonary metastases  She has moderate right hydronephrosis and is planned to undergo stent placement  As of her last PET-CT scan in September of 2018, there was no visible evidence of disease recurrence  She is clinically without evidence of disease  Her performance status is 0    1  Return in 6 months for cervical cancer surveillance

## 2019-04-04 ENCOUNTER — HOSPITAL ENCOUNTER (OUTPATIENT)
Dept: NON INVASIVE DIAGNOSTICS | Facility: HOSPITAL | Age: 79
Discharge: HOME | End: 2019-04-04
Payer: MEDICARE

## 2019-04-04 DIAGNOSIS — Z86.718 HISTORY OF DVT (DEEP VEIN THROMBOSIS): ICD-10-CM

## 2019-04-04 DIAGNOSIS — I89.0 LYMPHEDEMA: ICD-10-CM

## 2019-04-04 DIAGNOSIS — M79.605 LOWER EXTREMITY PAIN, DIFFUSE, LEFT: Primary | ICD-10-CM

## 2019-04-04 DIAGNOSIS — M79.605 LOWER EXTREMITY PAIN, DIFFUSE, LEFT: ICD-10-CM

## 2019-04-04 PROCEDURE — 93971 EXTREMITY STUDY: CPT

## 2019-04-04 PROCEDURE — 93971 EXTREMITY STUDY: CPT | Performed by: SURGERY

## 2019-04-05 ENCOUNTER — TRANSCRIBE ORDERS (OUTPATIENT)
Dept: ADMINISTRATIVE | Facility: HOSPITAL | Age: 79
End: 2019-04-05

## 2019-04-05 DIAGNOSIS — Z12.31 VISIT FOR SCREENING MAMMOGRAM: Primary | ICD-10-CM

## 2019-05-13 ENCOUNTER — HOSPITAL ENCOUNTER (OUTPATIENT)
Dept: MAMMOGRAPHY | Facility: MEDICAL CENTER | Age: 79
Discharge: HOME | End: 2019-05-13
Payer: MEDICARE

## 2019-05-13 VITALS — BODY MASS INDEX: 26.06 KG/M2 | WEIGHT: 138 LBS | HEIGHT: 61 IN

## 2019-05-13 DIAGNOSIS — Z12.31 VISIT FOR SCREENING MAMMOGRAM: ICD-10-CM

## 2019-05-13 PROCEDURE — 77067 SCR MAMMO BI INCL CAD: CPT

## 2019-05-13 PROCEDURE — 77063 BREAST TOMOSYNTHESIS BI: CPT

## 2019-07-31 DIAGNOSIS — R60.0 EDEMA OF LEFT LOWER EXTREMITY: Primary | ICD-10-CM

## 2019-09-11 ENCOUNTER — OFFICE VISIT (OUTPATIENT)
Dept: GYNECOLOGIC ONCOLOGY | Facility: HOSPITAL | Age: 79
End: 2019-09-11
Payer: MEDICARE

## 2019-09-11 VITALS
HEART RATE: 70 BPM | HEIGHT: 61 IN | SYSTOLIC BLOOD PRESSURE: 124 MMHG | DIASTOLIC BLOOD PRESSURE: 86 MMHG | WEIGHT: 137.8 LBS | BODY MASS INDEX: 26.01 KG/M2

## 2019-09-11 DIAGNOSIS — Z85.41 HISTORY OF CERVICAL CANCER: Primary | ICD-10-CM

## 2019-09-11 PROCEDURE — 99213 OFFICE O/P EST LOW 20 MIN: CPT | Performed by: OBSTETRICS & GYNECOLOGY

## 2019-09-11 NOTE — ASSESSMENT & PLAN NOTE
78-year-old with a history of recurrent stage I B1 cervical cancer with pulmonary metastatic disease  She is clinically without evidence of disease recurrence  Her performance status is 0   1  PET-CT scan to evaluate for measurable recurrence given her hydronephrosis  2  Return in 6 months for cervical cancer surveillance  I spent 15 minutes with the patient  More than half the time was spent in counseling and coordination of care regarding treatment of her recurrent cervical cancer

## 2019-09-11 NOTE — PROGRESS NOTES
Assessment/Plan:    Problem List Items Addressed This Visit        Other    History of cervical cancer - Primary     49-year-old with a history of recurrent stage I B1 cervical cancer with pulmonary metastatic disease  She is clinically without evidence of disease recurrence  Her performance status is 0   1  PET-CT scan to evaluate for measurable recurrence given her hydronephrosis  2  Return in 6 months for cervical cancer surveillance  I spent 15 minutes with the patient  More than half the time was spent in counseling and coordination of care regarding treatment of her recurrent cervical cancer  Relevant Orders    NM PET CT skull base to mid thigh            CHIEF COMPLAINT:  Cervical cancer surveillance      Problem:  Cancer Staging  History of cervical cancer  Staging form: Cervix Uteri, AJCC 8th Edition  - Clinical: FIGO Stage IB1 (cT1b1, cM0) - Signed by Reed Ormond, PA-C on 4/25/2018        Previous therapy:     History of cervical cancer     Initial Diagnosis     Cervical cancer ([facility] [address] )      1/11/2011 Surgery     ALISTAIR-BSO, pelvic and paraaortic lymph node dissection for presumed endometrial cancer  A  3 2 cm SCC, outer third invasion, microscopically positive posterior margin, 32 negative lymph nodes, negative washings       Chemotherapy     Cisplatin with radiation       - 4/11/2011 Radiation     Whole pelvic radiotherapy       Adverse Reaction     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      3/6/2014 Surgery     Right upper lobe resection x 2  A   Recurrent SCC      9/1/2014 Surgery     Thoracotomy and resection of left pulmonary lesions       - 6/2018 Radiation     50Gy to left lung recurrence        Diffuse large B cell lymphoma ([facility] [address] )     Initial Diagnosis     Diffuse large B cell lymphoma ([facility] [address] )       - 12/6/2012 Chemotherapy     R-CHOP           Patient ID: Taty Go is a [de-identified] y o  female  49-year-old returns for cervical cancer surveillance  Her last PET scan was in September of 2018 did not reveal any evidence of recurrent disease  She has been undergoing right ureteral stent exchange for right hydronephrosis with a current diagnosis of radiation induced retroperitoneal fibrosis  She continues to undergo treatment for lymphedema and currently has her left lower extremity wrapped  Her last SBRT for a long metastatic disease was in June of 2018  Mammography in May of 2019 was BI-RADS category 2  She does not have vaginal bleeding or pelvic pain  She continues to have difficulty with bowel function and follows with Gastroenterology  No other interval changes in her medications or medical history since her last visit        The following portions of the patient's history were reviewed and updated as appropriate: allergies, current medications, past family history, past medical history, past social history, past surgical history and problem list     Review of Systems    Current Outpatient Medications   Medication Sig Dispense Refill    acetaminophen (TYLENOL) 650 mg CR tablet Take 650 mg by mouth every 8 (eight) hours      ascorbic acid (VITAMIN C) 1000 MG tablet Take 1,000 mg by mouth      aspirin (ASPIR-81) 81 mg EC tablet Take by mouth      CALCIUM-MAGNESIUM-VITAMIN D PO Take by mouth      Cholecalciferol (VITAMIN D3) 2000 units capsule Take 1 tablet by mouth daily      DAILY MULTIPLE VITAMINS PO Take 1 tablet by mouth daily      diazepam (VALIUM) 5 mg tablet Take 2 5 mg by mouth every 6 (six) hours as needed for anxiety      dicyclomine (BENTYL) 10 mg capsule Take 1 capsule by mouth      Garlic 178 MG TABS Take by mouth      Glutathione POWD by Does not apply route      guaiFENesin (MUCINEX) 600 mg 12 hr tablet Take 1,200 mg by mouth      lisinopril (ZESTRIL) 2 5 mg tablet Take 2 5 mg by mouth daily  3    Omega-3 Fatty Acids (FISH OIL) 1,000 mg Take 1,000 mg by mouth daily      omeprazole (PRILOSEC) 20 mg delayed release capsule Take 20 mg by mouth      Probiotic Product (PROBIOTIC-10) CAPS Take by mouth daily      SYNTHROID 50 MCG tablet       TURMERIC CURCUMIN PO Take by mouth      Ubiquinol 100 MG CAPS Take 1 capsule by mouth       No current facility-administered medications for this visit  Objective:    Blood pressure 124/86, pulse 70, height 5' 0 5" (1 537 m), weight 62 5 kg (137 lb 12 8 oz)  Body mass index is 26 47 kg/m²  Body surface area is 1 6 meters squared  Physical Exam   Constitutional: She is oriented to person, place, and time  She appears well-developed and well-nourished  No distress  Pulmonary/Chest: Effort normal    Neurological: She is alert and oriented to person, place, and time  Skin: She is not diaphoretic  Psychiatric: She has a normal mood and affect   Her behavior is normal  Judgment and thought content normal

## 2019-11-18 ENCOUNTER — OFFICE VISIT (OUTPATIENT)
Dept: URGENT CARE | Facility: MEDICAL CENTER | Age: 79
End: 2019-11-18
Payer: MEDICARE

## 2019-11-18 VITALS
RESPIRATION RATE: 18 BRPM | HEIGHT: 60 IN | TEMPERATURE: 97.6 F | OXYGEN SATURATION: 96 % | HEART RATE: 70 BPM | BODY MASS INDEX: 25.91 KG/M2 | WEIGHT: 132 LBS | DIASTOLIC BLOOD PRESSURE: 82 MMHG | SYSTOLIC BLOOD PRESSURE: 191 MMHG

## 2019-11-18 DIAGNOSIS — S61.219A LACERATION WITHOUT FOREIGN BODY OF UNSPECIFIED FINGER WITHOUT DAMAGE TO NAIL, INITIAL ENCOUNTER: Primary | ICD-10-CM

## 2019-11-18 PROCEDURE — G0463 HOSPITAL OUTPT CLINIC VISIT: HCPCS | Performed by: PHYSICIAN ASSISTANT

## 2019-11-18 PROCEDURE — 99213 OFFICE O/P EST LOW 20 MIN: CPT | Performed by: PHYSICIAN ASSISTANT

## 2019-11-18 PROCEDURE — 12001 RPR S/N/AX/GEN/TRNK 2.5CM/<: CPT | Performed by: PHYSICIAN ASSISTANT

## 2019-11-19 NOTE — PATIENT INSTRUCTIONS
Keep dressing on for the next 2-3 days  Follow up with PCP in 3-5 days  Proceed to  ER if symptoms worsen  Skin Adhesive Care   WHAT YOU NEED TO KNOW:   What is skin adhesive? Skin adhesive is medical glue used to close wounds  It is a substitute for staples and stitches  Skin adhesive wound closures take less time and do not require anesthesia  You have less pain and a lower risk of infection than with staples or stitches  Skin adhesive will fall off after the wound is healed  How do I care for the skin adhesive that covers my wound? · Keep your wound clean and dry  for 1 to 5 days  You can shower 24 hours after the skin adhesive is applied  Lightly pat your wound dry after you shower  · Do not soak  your wound in water, such as in a bath or hot tub  · Do not scrub  your wound or pick at the adhesive  This can make your wound reopen  · Do not apply ointments  to your wound  These include antibiotic and other ointments that contain petroleum jelly  These products will remove skin adhesive and reopen your wound  When should I contact my healthcare provider? · You have a fever  · Your wound is red, swollen, and warm to touch  · You have questions or concerns about your condition or care  When should I seek immediate care? · Your wound is draining pus  · Your wound opens  CARE AGREEMENT:   You have the right to help plan your care  Learn about your health condition and how it may be treated  Discuss treatment options with your caregivers to decide what care you want to receive  You always have the right to refuse treatment  The above information is an  only  It is not intended as medical advice for individual conditions or treatments  Talk to your doctor, nurse or pharmacist before following any medical regimen to see if it is safe and effective for you    © 2017 [employer] Information is for End User's use only and may not be sold, redistributed or otherwise used for commercial purposes  All illustrations and images included in CareNotes® are the copyrighted property of A D A M , Inc  or David Umana  Laceration   AMBULATORY CARE:   A laceration  is an injury to the skin and the soft tissue underneath it  Lacerations happen when you are cut or hit by something  They can happen anywhere on the body  Common symptoms include the following:   · Injury or wound to skin and tissue of any shape size that looks like a cut, tear, or gash    · Edges of the wound may be close together or wide apart    · Pain, bleeding, bruising, or swelling    · Numbness around the wound    · Decreased movement in an area below the wound  Seek care immediately if:   · You have heavy bleeding or bleeding that does not stop after 10 minutes of holding firm, direct pressure over the wound  · Your wound opens up  Contact your healthcare provider if:   · You have a fever or chills  · Your laceration is red, warm, or swollen  · You have red streaks on your skin coming from your wound  · You have white or yellow drainage from the wound that smells bad  · You have pain that gets worse, even after treatment  · You have questions or concerns about your condition or care  Treatment for a laceration  includes care to stop any bleeding  Your healthcare provider will stop the bleeding by applying pressure to the wound  He may need to check your wound for foreign objects and clean it to decrease the chance of infection  Your laceration may be closed with stitches, staples, tissue glue, or medical strips  Ask your healthcare provider if you need a tetanus shot  Medicines:   · Prescription pain medicine  may be given  Ask how to take this medicine safely  · Antibiotics  help treat or prevent a bacterial infection  · Take your medicine as directed  Contact your healthcare provider if you think your medicine is not helping or if you have side effects   Tell him or her if you are allergic to any medicine  Keep a list of the medicines, vitamins, and herbs you take  Include the amounts, and when and why you take them  Bring the list or the pill bottles to follow-up visits  Carry your medicine list with you in case of an emergency  Care for your wound as directed:   · Do not get your wound wet  until your healthcare provider says it is okay  Do not soak your wound in water  Do not go swimming until your healthcare provider says it is okay  Carefully wash the wound with soap and water  Gently pat the area dry or allow it to air dry  · Change your bandages  when they get wet, dirty, or after washing  Apply new, clean bandages as directed  Do not apply elastic bandages or tape too tight  Do not put powders or lotions over your incision  · Apply antibiotic ointment as directed  Your healthcare provider may give you antibiotic ointment to put over your wound if you have stitches  If you have strips of tape over your incision, let them dry up and fall off on their own  If they do not fall off within 14 days, gently remove them  If you have glue over your wound, do not remove or pick at it  If your glue comes off, do not replace it with glue that you have at home  · Check your wound every day for signs of infection such as swelling, redness, or pus  Self-care:   · Apply ice  on your wound for 15 to 20 minutes every hour or as directed  Use an ice pack, or put crushed ice in a plastic bag  Cover it with a towel  Ice helps prevent tissue damage and decreases swelling and pain  · Use a splint as directed  A splint will decrease movement and stress on your wound  It may help it heal faster  A splint may be used for lacerations over joints or areas of your body that bend  Ask your healthcare provider how to apply and remove a splint  · Decrease scarring of your wound  by applying ointments as directed   Do not apply ointments until your healthcare provider says it is okay  You may need to wait until your wound is healed  Ask which ointment to buy and how often to use it  After your wound is healed, use sunscreen over the area when you are out in the sun  You should do this for at least 6 months to 1 year after your injury  Follow up with your healthcare provider as directed:  Write down your questions so you remember to ask them during your visits  © 2017 [address]  Information is for End User's use only and may not be sold, redistributed or otherwise used for commercial purposes  All illustrations and images included in CareNotes® are the copyrighted property of A D A M , Inc  or David Umana  The above information is an  only  It is not intended as medical advice for individual conditions or treatments  Talk to your doctor, nurse or pharmacist before following any medical regimen to see if it is safe and effective for you

## 2019-11-19 NOTE — PROGRESS NOTES
[facility]        NAME: Phuc Steiner is a 78 y o  female  : 1940    MRN: [id number]  DATE: 2019  TIME: 11:59 PM    Assessment and Plan   Laceration without foreign body of unspecified finger without damage to nail, initial encounter [S61 219A]  1  Laceration without foreign body of unspecified finger without damage to nail, initial encounter  Laceration repair         Patient Instructions     Keep dressing on for the next 2-3 days  Follow up with PCP in 3-5 days  Proceed to  ER if symptoms worsen  Chief Complaint     Chief Complaint   Patient presents with    Laceration     while attempting to cut a bagel, pt cut her left 2nd finger with a serrated knife  2cm lac noted, actively bleeding  History of Present Illness       33-year-old female presents with laceration to left index finger  She reports that she was cutting a bagel and accidentally cut her finger  Up-to-date on tetanus  Injury   The incident occurred less than 1 hour ago  The incident occurred at home  The injury mechanism was a cut/puncture wound  The injury occurred in the context of a self-inflicted injury  No protective equipment was used  There is an injury to the left index finger  The pain is mild  It is unlikely that a foreign body is present  Pertinent negatives include no tingling or weakness  There have been no prior injuries to these areas  Her tetanus status is UTD  Review of Systems   Review of Systems   Constitutional: Negative  Respiratory: Negative  Cardiovascular: Negative  Gastrointestinal: Negative  Musculoskeletal: Negative  Skin: Positive for wound  Neurological: Negative  Negative for tingling and weakness           Current Medications       Current Outpatient Medications:     acetaminophen (TYLENOL) 650 mg CR tablet, Take 650 mg by mouth every 8 (eight) hours, Disp: , Rfl:     ascorbic acid (VITAMIN C) 1000 MG tablet, Take 1,000 mg by mouth, Disp: , Rfl:     aspirin (ASPIR-81) 81 mg EC tablet, Take by mouth, Disp: , Rfl:     CALCIUM-MAGNESIUM-VITAMIN D PO, Take by mouth, Disp: , Rfl:     Cholecalciferol (VITAMIN D3) 2000 units capsule, Take 1 tablet by mouth daily, Disp: , Rfl:     DAILY MULTIPLE VITAMINS PO, Take 1 tablet by mouth daily, Disp: , Rfl:     diazepam (VALIUM) 5 mg tablet, Take 2 5 mg by mouth every 6 (six) hours as needed for anxiety, Disp: , Rfl:     dicyclomine (BENTYL) 10 mg capsule, Take 1 capsule by mouth, Disp: , Rfl:     Garlic 751 MG TABS, Take by mouth, Disp: , Rfl:     Glutathione POWD, by Does not apply route, Disp: , Rfl:     guaiFENesin (MUCINEX) 600 mg 12 hr tablet, Take 1,200 mg by mouth, Disp: , Rfl:     lisinopril (ZESTRIL) 2 5 mg tablet, Take 2 5 mg by mouth daily, Disp: , Rfl: 3    Omega-3 Fatty Acids (FISH OIL) 1,000 mg, Take 1,000 mg by mouth daily, Disp: , Rfl:     omeprazole (PRILOSEC) 20 mg delayed release capsule, Take 20 mg by mouth, Disp: , Rfl:     Probiotic Product (PROBIOTIC-10) CAPS, Take by mouth daily, Disp: , Rfl:     SYNTHROID 50 MCG tablet, , Disp: , Rfl:     TURMERIC CURCUMIN PO, Take by mouth, Disp: , Rfl:     Ubiquinol 100 MG CAPS, Take 1 capsule by mouth, Disp: , Rfl:     Current Allergies     Allergies as of 11/18/2019 - Reviewed 11/18/2019   Allergen Reaction Noted    Other Other (See Comments) 03/04/2014    Oxybutynin Rash 11/18/2019    Pollen extract Allergic Rhinitis 04/25/2016    Sulfa antibiotics Rash 08/27/2012            The following portions of the patient's history were reviewed and updated as appropriate: allergies, current medications, past family history, past medical history, past social history, past surgical history and problem list      Past Medical History:   Diagnosis Date    Cervical cancer ([facility] [address] ) 01/16/2011    History of chemotherapy     age 65    History of DVT (deep vein thrombosis)     History of radiation therapy     age 65       Past Surgical History: Procedure Laterality Date    BREAST EXCISIONAL BIOPSY Left 1985    benign    HYSTERECTOMY      age71    OOPHORECTOMY Bilateral     age 65       Family History   Problem Relation Age of Onset    No Known Problems Mother     No Known Problems Father          Medications have been verified  Objective   BP (!) 191/82   Pulse 70   Temp 97 6 °F (36 4 °C)   Resp 18   Ht 5' (1 524 m)   Wt 59 9 kg (132 lb)   SpO2 96%   BMI 25 78 kg/m²          Physical Exam     Physical Exam   Constitutional: She is oriented to person, place, and time  She appears well-developed and well-nourished  No distress  HENT:   Head: Normocephalic and atraumatic  Right Ear: External ear normal    Left Ear: External ear normal    Nose: Nose normal    Mouth/Throat: Oropharynx is clear and moist  No oropharyngeal exudate  Eyes: Conjunctivae are normal  Right eye exhibits no discharge  Left eye exhibits no discharge  Neck: Normal range of motion  Neck supple  Cardiovascular: Intact distal pulses  Pulmonary/Chest: Effort normal  No respiratory distress  Musculoskeletal: Normal range of motion  Left hand: She exhibits laceration  She exhibits normal range of motion, no tenderness, no bony tenderness, normal two-point discrimination, normal capillary refill, no deformity and no swelling  Normal sensation noted  Hands:  Neurological: She is alert and oriented to person, place, and time  Skin: Skin is warm and dry  Laceration (1 cm laceration noted to left index finger on the volar aspect ) noted  Psychiatric: She has a normal mood and affect  Nursing note and vitals reviewed  Laceration repair  Date/Time: 11/18/2019 11:58 PM  Performed by: Heather Urrutia PA-C  Authorized by: Roslyn España PA-C   Consent: Verbal consent not obtained  Written consent not obtained    Risks and benefits: risks, benefits and alternatives were discussed  Consent given by: patient  Patient understanding: patient states understanding of the procedure being performed  Patient identity confirmed: verbally with patient  Body area: upper extremity  Location details: left index finger  Laceration length: 1 cm  Foreign bodies: no foreign bodies  Tendon involvement: none  Nerve involvement: none  Vascular damage: no    Wound Dehiscence:  Superficial Wound Dehiscence: simple closure      Procedure Details:  Preparation: Patient was prepped and draped in the usual sterile fashion    Irrigation solution: saline  Amount of cleaning: standard  Debridement: none  Degree of undermining: none  Skin closure: glue and Steri-Strips  Approximation: close  Approximation difficulty: simple  Dressing: splint  Patient tolerance: Patient tolerated the procedure well with no immediate complications

## 2019-12-01 DIAGNOSIS — E04.1 NONTOXIC SINGLE THYROID NODULE: ICD-10-CM

## 2020-01-08 ENCOUNTER — HOSPITAL ENCOUNTER (OUTPATIENT)
Dept: ULTRASOUND IMAGING | Facility: MEDICAL CENTER | Age: 80
Discharge: HOME | End: 2020-01-08
Payer: MEDICARE

## 2020-01-08 DIAGNOSIS — E04.1 NONTOXIC SINGLE THYROID NODULE: ICD-10-CM

## 2020-01-08 PROCEDURE — 76536 US EXAM OF HEAD AND NECK: CPT

## 2020-01-17 ENCOUNTER — OFFICE VISIT (OUTPATIENT)
Dept: SURGICAL ONCOLOGY | Facility: CLINIC | Age: 80
End: 2020-01-17
Payer: MEDICARE

## 2020-01-17 VITALS
DIASTOLIC BLOOD PRESSURE: 70 MMHG | HEIGHT: 60 IN | WEIGHT: 138 LBS | BODY MASS INDEX: 27.09 KG/M2 | HEART RATE: 69 BPM | SYSTOLIC BLOOD PRESSURE: 120 MMHG | TEMPERATURE: 99 F | RESPIRATION RATE: 16 BRPM

## 2020-01-17 DIAGNOSIS — E04.1 NONTOXIC SINGLE THYROID NODULE: Primary | ICD-10-CM

## 2020-01-17 DIAGNOSIS — R93.89 THYROID WITH HETEROGENEOUS ECHOTEXTURE DETERMINED BY ULTRASOUND: ICD-10-CM

## 2020-01-17 PROCEDURE — 99213 OFFICE O/P EST LOW 20 MIN: CPT | Performed by: NURSE PRACTITIONER

## 2020-01-17 NOTE — PROGRESS NOTES
Surgical Oncology Follow Up       [facility] SURGICAL ONCOLOGY [address]  1940  [id number]      Chief Complaint   Patient presents with    Follow-up     Pt is here for 1 year follow up thyroid nodule        Assessment/Plan:  1  Nontoxic single thyroid nodule  - No discrete nodules identified    2  Thyroid with heterogeneous echotexture determined by ultrasound  - US thyroid; Future  - 1 year f/u with Dr Juancarlos Palomino    Discussion/Summary:  Patient is an 28-year-old female with a history of metastatic cervical cancer and B cell lymphoma who was referred to our office after there was PET-CT activity of her thyroid in  9/2018  This report was reviewed via Care Everywhere and reported a 2 5 x 2 cm isthmus mass  There is also uptake in both thyroid lobes left>right suggesting underlying thyroiditis  She underwent a left-sided thyroid biopsy in 2011 which was reported as benign  She underwent a thyroid ultrasound in January 2019 which revealed a stable heterogenous thyroid but no discrete nodules  A repeat thyroid ultrasound was performed on January 8, 2020 which was stable  No discrete thyroid nodules were identified  She has no new complaints today  There are no concerns on her exam  I do not appreciate any palpable thyroid nodules but there is left sided fullness compared to the right  I reviewed her case with Dr Juancarlos Palomino, who also reviewed her images and spoke with the patient today  We will repeat her u/s in 1 year to ensure stability  She was instructed to call with any new concerns or symptoms prior to that time  She is in agreement with this plan  She will continue to follow with her gynecologic oncologist as well as her medical oncologist   All of her questions were answered today  History of Present Illness:     2/18/2011- Left thyroid biopsy - Negative for malignancy    Hyperplastic nodule in the background of lymphocytic thyroiditis    -Interval History:  Patient presents today for 1 year follow-up visit for thyroid nodules  She had a thyroid ultrasound was performed on January 8, 2020 which was stable- no discrete thyroid nodules were identified  She denies difficulty swallowing or voice changes  She reports no family history of thyroid cancer  Review of Systems:  Review of Systems   Constitutional: Negative for activity change, appetite change, chills, fatigue, fever and unexpected weight change  HENT: Negative for trouble swallowing and voice change  Eyes: Negative for pain, redness and visual disturbance  Respiratory: Negative for cough, shortness of breath and wheezing  Cardiovascular: Negative for chest pain, palpitations and leg swelling  Gastrointestinal: Positive for abdominal pain (intermittent cramping secondary to pelvic radiation)  Negative for constipation, diarrhea, nausea and vomiting  Endocrine: Negative for cold intolerance and heat intolerance  Musculoskeletal: Negative for arthralgias, back pain, gait problem and myalgias  Skin: Negative for color change and rash  Neurological: Negative for dizziness, syncope, light-headedness, numbness and headaches  Hematological: Negative for adenopathy  Psychiatric/Behavioral: Negative for agitation and confusion  All other systems reviewed and are negative        Patient Active Problem List   Diagnosis    History of DVT (deep vein thrombosis)    History of cervical cancer    Diffuse large B cell lymphoma (HCC)    Nontoxic single thyroid nodule    Abdominal pain of multiple sites    Chemotherapy follow-up examination    Compression fracture of third lumbar vertebra with routine healing    Depression    Edema of left lower extremity    Benign essential hypertension    Hypertension    Hypothyroidism    Lymphedema    Nodule of left lung    Other diseases of lung, not elsewhere classified    Renal insufficiency    Encounter for follow-up surveillance of cervical cancer     Past Medical History:   Diagnosis Date    Cervical cancer ([facility] [address] ) 01/16/2011    History of chemotherapy     age 65    History of DVT (deep vein thrombosis)     History of radiation therapy     age 65     Past Surgical History:   Procedure Laterality Date    BREAST EXCISIONAL BIOPSY Left 1985    benign    HYSTERECTOMY      age71    OOPHORECTOMY Bilateral     age 65     Family History   Problem Relation Age of Onset    No Known Problems Mother     No Known Problems Father      Social History     Socioeconomic History    Marital status: /Civil Union     Spouse name: Not on file    Number of children: Not on file    Years of education: Not on file    Highest education level: Not on file   Occupational History    Not on file   Social Needs    Financial resource strain: Not on file    Food insecurity:     Worry: Not on file     Inability: Not on file    Transportation needs:     Medical: Not on file     Non-medical: Not on file   Tobacco Use    Smoking status: Never Smoker    Smokeless tobacco: Never Used   Substance and Sexual Activity    Alcohol use: No    Drug use: No    Sexual activity: Not on file   Lifestyle    Physical activity:     Days per week: Not on file     Minutes per session: Not on file    Stress: Not on file   Relationships    Social connections:     Talks on phone: Not on file     Gets together: Not on file     Attends Hoahaoism service: Not on file     Active member of club or organization: Not on file     Attends meetings of clubs or organizations: Not on file     Relationship status: Not on file    Intimate partner violence:     Fear of current or ex partner: Not on file     Emotionally abused: Not on file     Physically abused: Not on file     Forced sexual activity: Not on file   Other Topics Concern    Not on file   Social History Narrative    Not on file       Current Outpatient Medications:    acetaminophen (TYLENOL) 650 mg CR tablet, Take 650 mg by mouth every 8 (eight) hours, Disp: , Rfl:     ascorbic acid (VITAMIN C) 1000 MG tablet, Take 1,000 mg by mouth, Disp: , Rfl:     aspirin (ASPIR-81) 81 mg EC tablet, Take by mouth, Disp: , Rfl:     CALCIUM-MAGNESIUM-VITAMIN D PO, Take by mouth, Disp: , Rfl:     Cholecalciferol (VITAMIN D3) 2000 units capsule, Take 1 tablet by mouth daily, Disp: , Rfl:     DAILY MULTIPLE VITAMINS PO, Take 1 tablet by mouth daily, Disp: , Rfl:     diazepam (VALIUM) 5 mg tablet, Take 2 5 mg by mouth every 6 (six) hours as needed for anxiety, Disp: , Rfl:     dicyclomine (BENTYL) 10 mg capsule, Take 1 capsule by mouth, Disp: , Rfl:     Garlic 431 MG TABS, Take by mouth, Disp: , Rfl:     Glutathione POWD, by Does not apply route, Disp: , Rfl:     guaiFENesin (MUCINEX) 600 mg 12 hr tablet, Take 1,200 mg by mouth, Disp: , Rfl:     lisinopril (ZESTRIL) 2 5 mg tablet, Take 2 5 mg by mouth daily, Disp: , Rfl: 3    Omega-3 Fatty Acids (FISH OIL) 1,000 mg, Take 1,000 mg by mouth daily, Disp: , Rfl:     omeprazole (PRILOSEC) 20 mg delayed release capsule, Take 20 mg by mouth, Disp: , Rfl:     Probiotic Product (PROBIOTIC-10) CAPS, Take by mouth daily, Disp: , Rfl:     Specialty Vitamins Products (ONE-A-DAY BONE STRENGTH PO), Take by mouth, Disp: , Rfl:     SYNTHROID 50 MCG tablet, , Disp: , Rfl:     TURMERIC CURCUMIN PO, Take by mouth, Disp: , Rfl:     Ubiquinol 100 MG CAPS, Take 1 capsule by mouth, Disp: , Rfl:   Allergies   Allergen Reactions    Other Other (See Comments) and Itching     BAND AIDES RASH  Blisters    Oxybutynin Rash    Pollen Extract Allergic Rhinitis and Other (See Comments)    Sulfa Antibiotics Rash     Vitals:    01/17/20 1347   BP: 120/70   Pulse: 69   Resp: 16   Temp: 99 °F (37 2 °C)       Physical Exam   Constitutional: She is oriented to person, place, and time  Vital signs are normal  She appears well-developed and well-nourished  No distress  HENT:   Head: Normocephalic and atraumatic  Neck: Normal range of motion  Left sided neck fullness but no discrete nodules appreciated  No cervical lymphadenopathy  Pulmonary/Chest: Effort normal and breath sounds normal    Abdominal: Normal appearance  There is no hepatosplenomegaly  Musculoskeletal: Normal range of motion  Lymphadenopathy:     She has no axillary adenopathy  Right: No supraclavicular adenopathy present  Left: No supraclavicular adenopathy present  Neurological: She is alert and oriented to person, place, and time  Skin: Skin is warm, dry and intact  No rash noted  She is not diaphoretic  Psychiatric: She has a normal mood and affect  Her speech is normal    Vitals reviewed  Results:    Imaging  Us Thyroid    Result Date: 1/9/2020  Narrative: THYROID ULTRASOUND INDICATION:    E04 1: Nontoxic single thyroid nodule  History of B-cell lymphoma, hypothyroidism, and follow-up thyroid nodules  COMPARISON:  Thyroid ultrasound on January 15, 2019, May 3, 2018, and head and neck ultrasound on December 9, 2015  TECHNIQUE:   Ultrasound of the thyroid was performed with a high frequency linear transducer in transverse and sagittal planes including volumetric imaging sweeps as well as traditional still imaging technique  FINDINGS:  Thyroid parenchyma is diffusely heterogeneous in echotexture and also hyperemic  Right lobe:  4 0 x 1 9 x 1 2 cm  Total volume: 4 4 mL Left lobe:  5 2 x 2 2 x 1 7 cm  Total volume: 9 4 mL Isthmus:  0 5 cm  No thyroid nodules are demonstrated  (The thyroid nodules described on prior exam do not appear to be discrete nodules currently but rather part of the diffusely heterogeneous gland )     Impression: Diffusely heterogeneous thyroid gland without discrete thyroid nodules noted  Reference: ACR Thyroid Imaging, Reporting and Data System (TI-RADS): White Paper of the [employer]   J AM Eliel Radiol 2227;50:[id number]  (additional recommendations based on American Thyroid Association 2015 guidelines ) I, Elissa Sumner PA-C, am acting as a scribe while in the presence of the attending physician  I, Dr Laure Howard, personally reviewed and interpreted the study in this report as scribed in my presence  Workstation performed: [id number]       I reviewed the above imaging data  Advance Care Planning/Advance Directives:  Discussed disease status and treatment goals with the patient

## 2020-02-24 DIAGNOSIS — I89.0 LYMPHEDEMA: Primary | ICD-10-CM

## 2020-06-23 ENCOUNTER — OFFICE VISIT (OUTPATIENT)
Dept: GYNECOLOGIC ONCOLOGY | Facility: CLINIC | Age: 80
End: 2020-06-23
Payer: MEDICARE

## 2020-06-23 VITALS
SYSTOLIC BLOOD PRESSURE: 136 MMHG | TEMPERATURE: 99.8 F | WEIGHT: 134.5 LBS | OXYGEN SATURATION: 98 % | DIASTOLIC BLOOD PRESSURE: 78 MMHG | BODY MASS INDEX: 26.41 KG/M2 | HEART RATE: 87 BPM | HEIGHT: 60 IN

## 2020-06-23 DIAGNOSIS — Z08 ENCOUNTER FOR FOLLOW-UP SURVEILLANCE OF CERVICAL CANCER: ICD-10-CM

## 2020-06-23 DIAGNOSIS — Z85.41 ENCOUNTER FOR FOLLOW-UP SURVEILLANCE OF CERVICAL CANCER: ICD-10-CM

## 2020-06-23 DIAGNOSIS — Z85.41 HISTORY OF CERVICAL CANCER: Primary | ICD-10-CM

## 2020-06-23 PROCEDURE — 99212 OFFICE O/P EST SF 10 MIN: CPT | Performed by: OBSTETRICS & GYNECOLOGY

## 2020-06-29 DIAGNOSIS — Z12.31 ENCOUNTER FOR SCREENING MAMMOGRAM FOR BREAST CANCER: Primary | ICD-10-CM

## 2020-09-10 ENCOUNTER — TELEPHONE (OUTPATIENT)
Dept: GYNECOLOGIC ONCOLOGY | Facility: CLINIC | Age: 80
End: 2020-09-10

## 2020-09-10 NOTE — TELEPHONE ENCOUNTER
Patient called back  She has been having neck and back issues and has decided to postpone the Mammo at this time   Patient will call and reschedule soon but wants to take care of other issues first

## 2020-11-30 ENCOUNTER — HOSPITAL ENCOUNTER (OUTPATIENT)
Dept: ULTRASOUND IMAGING | Facility: MEDICAL CENTER | Age: 80
Discharge: HOME | End: 2020-11-30
Payer: MEDICARE

## 2020-11-30 DIAGNOSIS — R93.89 THYROID WITH HETEROGENEOUS ECHOTEXTURE DETERMINED BY ULTRASOUND: ICD-10-CM

## 2020-11-30 PROCEDURE — 76536 US EXAM OF HEAD AND NECK: CPT

## 2020-12-14 ENCOUNTER — TELEPHONE (OUTPATIENT)
Dept: SURGICAL ONCOLOGY | Facility: CLINIC | Age: 80
End: 2020-12-14

## 2020-12-29 ENCOUNTER — TELEPHONE (OUTPATIENT)
Dept: SURGICAL ONCOLOGY | Facility: CLINIC | Age: 80
End: 2020-12-29

## 2020-12-30 ENCOUNTER — OFFICE VISIT (OUTPATIENT)
Dept: SURGICAL ONCOLOGY | Facility: CLINIC | Age: 80
End: 2020-12-30
Payer: MEDICARE

## 2020-12-30 VITALS
WEIGHT: 132 LBS | HEIGHT: 60 IN | SYSTOLIC BLOOD PRESSURE: 150 MMHG | BODY MASS INDEX: 25.91 KG/M2 | TEMPERATURE: 96.9 F | DIASTOLIC BLOOD PRESSURE: 81 MMHG | HEART RATE: 60 BPM | RESPIRATION RATE: 16 BRPM

## 2020-12-30 DIAGNOSIS — E04.1 NONTOXIC SINGLE THYROID NODULE: Primary | ICD-10-CM

## 2020-12-30 PROCEDURE — 99213 OFFICE O/P EST LOW 20 MIN: CPT | Performed by: SURGERY

## 2021-01-11 DIAGNOSIS — I89.0 LYMPHEDEMA: Primary | ICD-10-CM

## 2021-01-11 DIAGNOSIS — Z85.41 HISTORY OF CERVICAL CANCER: ICD-10-CM

## 2021-01-13 ENCOUNTER — HOSPITAL ENCOUNTER (OUTPATIENT)
Dept: MAMMOGRAPHY | Facility: MEDICAL CENTER | Age: 81
Discharge: HOME | End: 2021-01-13
Payer: MEDICARE

## 2021-01-13 VITALS — HEIGHT: 60 IN | WEIGHT: 132 LBS | BODY MASS INDEX: 25.91 KG/M2

## 2021-01-13 DIAGNOSIS — Z12.31 ENCOUNTER FOR SCREENING MAMMOGRAM FOR BREAST CANCER: ICD-10-CM

## 2021-01-13 PROCEDURE — 77067 SCR MAMMO BI INCL CAD: CPT

## 2021-01-13 PROCEDURE — 77063 BREAST TOMOSYNTHESIS BI: CPT

## 2021-01-20 DIAGNOSIS — Z23 ENCOUNTER FOR IMMUNIZATION: ICD-10-CM

## 2021-05-19 ENCOUNTER — OFFICE VISIT (OUTPATIENT)
Dept: URGENT CARE | Facility: MEDICAL CENTER | Age: 81
End: 2021-05-19
Payer: MEDICARE

## 2021-05-19 VITALS
RESPIRATION RATE: 16 BRPM | HEIGHT: 60 IN | HEART RATE: 85 BPM | SYSTOLIC BLOOD PRESSURE: 172 MMHG | OXYGEN SATURATION: 100 % | BODY MASS INDEX: 25.91 KG/M2 | DIASTOLIC BLOOD PRESSURE: 80 MMHG | TEMPERATURE: 98 F | WEIGHT: 132 LBS

## 2021-05-19 DIAGNOSIS — S61.219A LACERATION WITHOUT FOREIGN BODY OF UNSPECIFIED FINGER WITHOUT DAMAGE TO NAIL, INITIAL ENCOUNTER: Primary | ICD-10-CM

## 2021-05-19 PROCEDURE — 99213 OFFICE O/P EST LOW 20 MIN: CPT | Performed by: PHYSICIAN ASSISTANT

## 2021-05-19 PROCEDURE — G0463 HOSPITAL OUTPT CLINIC VISIT: HCPCS | Performed by: PHYSICIAN ASSISTANT

## 2021-05-20 NOTE — PROGRESS NOTES
[facility]        NAME: Clemencia Ritchie is a 78 y o  female  : 1940    MRN: [id number]  DATE: May 19, 2021  TIME: 8:18 PM    BP (!) 172/80   Pulse 85   Temp 98 °F (36 7 °C)   Resp 16   Ht 5' (1 524 m)   Wt 59 9 kg (132 lb)   SpO2 100%   BMI 25 78 kg/m²     Assessment and Plan   Laceration without foreign body of unspecified finger without damage to nail, initial encounter [S61 219A]  1  Laceration without foreign body of unspecified finger without damage to nail, initial encounter           Patient Instructions       Follow up with PCP in 3-5 days  Proceed to  ER if symptoms worsen  Chief Complaint     Chief Complaint   Patient presents with    Finger Laceration     Pt lacerated 2 fingers of her left hand while using her kitchen knives this evening  History of Present Illness       Pt with left index and middle finger with laceration from kitchen knife      Review of Systems   Review of Systems   Constitutional: Negative  HENT: Negative  Eyes: Negative  Respiratory: Negative  Cardiovascular: Negative  Gastrointestinal: Negative  Endocrine: Negative  Genitourinary: Negative  Musculoskeletal: Negative  Skin: Negative  Allergic/Immunologic: Negative  Neurological: Negative  Hematological: Negative  Psychiatric/Behavioral: Negative  All other systems reviewed and are negative          Current Medications       Current Outpatient Medications:     acetaminophen (TYLENOL) 650 mg CR tablet, Take 650 mg by mouth every 8 (eight) hours, Disp: , Rfl:     ascorbic acid (VITAMIN C) 1000 MG tablet, Take 1,000 mg by mouth, Disp: , Rfl:     aspirin (ASPIR-81) 81 mg EC tablet, Take by mouth, Disp: , Rfl:     CALCIUM-MAGNESIUM-VITAMIN D PO, Take by mouth, Disp: , Rfl:     Cholecalciferol (VITAMIN D3) 2000 units capsule, Take 1 tablet by mouth daily, Disp: , Rfl:     DAILY MULTIPLE VITAMINS PO, Take 1 tablet by mouth daily, Disp: , Rfl:     diazepam (VALIUM) 5 mg tablet, Take 2 5 mg by mouth every 6 (six) hours as needed for anxiety, Disp: , Rfl:     dicyclomine (BENTYL) 10 mg capsule, Take 1 capsule by mouth , Disp: , Rfl:     Garlic 814 MG TABS, Take by mouth, Disp: , Rfl:     Glutathione POWD, by Does not apply route, Disp: , Rfl:     guaiFENesin (MUCINEX) 600 mg 12 hr tablet, Take 1,200 mg by mouth, Disp: , Rfl:     lisinopril (ZESTRIL) 2 5 mg tablet, Take 2 5 mg by mouth daily, Disp: , Rfl: 3    Omega-3 Fatty Acids (FISH OIL) 1,000 mg, Take 1,000 mg by mouth daily, Disp: , Rfl:     omeprazole (PRILOSEC) 20 mg delayed release capsule, Take 20 mg by mouth, Disp: , Rfl:     Probiotic Product (PROBIOTIC-10) CAPS, Take by mouth daily, Disp: , Rfl:     Specialty Vitamins Products (ONE-A-DAY BONE STRENGTH PO), Take by mouth, Disp: , Rfl:     SYNTHROID 50 MCG tablet, , Disp: , Rfl:     TURMERIC CURCUMIN PO, Take by mouth, Disp: , Rfl:     Ubiquinol 100 MG CAPS, Take 1 capsule by mouth, Disp: , Rfl:     Current Allergies     Allergies as of 05/19/2021 - Reviewed 05/19/2021   Allergen Reaction Noted    Other Other (See Comments) and Itching 03/04/2014    Oxybutynin Rash 11/18/2019    Pollen extract Allergic Rhinitis and Other (See Comments) 04/25/2016    Sulfa antibiotics Rash 08/27/2012            The following portions of the patient's history were reviewed and updated as appropriate: allergies, current medications, past family history, past medical history, past social history, past surgical history and problem list      Past Medical History:   Diagnosis Date    Cervical cancer ([facility] [address] ) 01/16/2011    History of chemotherapy     age 65    History of DVT (deep vein thrombosis)     History of radiation therapy     age 65       Past Surgical History:   Procedure Laterality Date    BREAST EXCISIONAL BIOPSY Left 1985    benign    HYSTERECTOMY      age69    OOPHORECTOMY Bilateral     age 65       Family History   Problem Relation Age of Onset    No Known Problems Mother     No Known Problems Father     No Known Problems Daughter          Medications have been verified  Objective   BP (!) 172/80   Pulse 85   Temp 98 °F (36 7 °C)   Resp 16   Ht 5' (1 524 m)   Wt 59 9 kg (132 lb)   SpO2 100%   BMI 25 78 kg/m²        Physical Exam     Physical Exam  Vitals signs and nursing note reviewed  Constitutional:       Appearance: Normal appearance  She is normal weight  HENT:      Head: Normocephalic and atraumatic  Right Ear: Tympanic membrane, ear canal and external ear normal       Left Ear: Ear canal and external ear normal       Nose: Nose normal       Mouth/Throat:      Mouth: Mucous membranes are moist       Pharynx: Oropharynx is clear  Eyes:      Conjunctiva/sclera: Conjunctivae normal       Pupils: Pupils are equal, round, and reactive to light  Neck:      Musculoskeletal: Normal range of motion and neck supple  Cardiovascular:      Rate and Rhythm: Normal rate and regular rhythm  Pulses: Normal pulses  Heart sounds: Normal heart sounds  Pulmonary:      Effort: Pulmonary effort is normal  No respiratory distress  Breath sounds: Normal breath sounds  Abdominal:      General: Abdomen is flat  Bowel sounds are normal       Palpations: Abdomen is soft  Musculoskeletal: Normal range of motion  Comments: Left 2nd and 3rd finger dip joint very superficial  125cm lacs  Cleaned and steri stripped  From all joints not tender to palp    Skin:     General: Skin is warm  Capillary Refill: Capillary refill takes less than 2 seconds  Neurological:      General: No focal deficit present  Mental Status: She is alert and oriented to person, place, and time     Psychiatric:         Mood and Affect: Mood normal

## 2021-05-20 NOTE — PATIENT INSTRUCTIONS
Finger Laceration   WHAT YOU NEED TO KNOW:   A finger laceration is a deep cut in your skin  Your blood vessels, bones, joints, tendons, or nerves may also be injured  DISCHARGE INSTRUCTIONS:   Return to the emergency department if:   · Your wound comes apart  · Blood soaks through your bandage  · You have severe pain in your finger or hand  · Your finger is pale and cold  · You have sudden trouble moving your finger  · Your swelling suddenly gets worse  · You have red streaks on your skin coming from your wound  Call your doctor or hand specialist if:   · You have new numbness or tingling  · Your finger feels warm, looks swollen or red, and is draining pus  · You have a fever  · You have questions or concerns about your condition or care  Medicines: You may  need any of the following:  · Antibiotics  help prevent a bacterial infection  · Acetaminophen  decreases pain and fever  It is available without a doctor's order  Ask how much to take and how often to take it  Follow directions  Read the labels of all other medicines you are using to see if they also contain acetaminophen, or ask your doctor or pharmacist  Acetaminophen can cause liver damage if not taken correctly  Do not use more than 4 grams (4,000 milligrams) total of acetaminophen in one day  · Prescription pain medicine  may be given  Ask your healthcare provider how to take this medicine safely  Some prescription pain medicines contain acetaminophen  Do not take other medicines that contain acetaminophen without talking to your healthcare provider  Too much acetaminophen may cause liver damage  Prescription pain medicine may cause constipation  Ask your healthcare provider how to prevent or treat constipation  · Take your medicine as directed  Contact your healthcare provider if you think your medicine is not helping or if you have side effects  Tell him or her if you are allergic to any medicine   Keep a list of the medicines, vitamins, and herbs you take  Include the amounts, and when and why you take them  Bring the list or the pill bottles to follow-up visits  Carry your medicine list with you in case of an emergency  Self-care:   · Apply ice  on your finger for 15 to 20 minutes every hour or as directed  Use an ice pack, or put crushed ice in a plastic bag  Cover it with a towel before you apply it to your skin  Ice helps prevent tissue damage and decreases swelling and pain  · Elevate  your hand above the level of your heart as often as you can  This will help decrease swelling and pain  Prop your hand on pillows or blankets to keep it elevated comfortably  · Wear your splint as directed  A splint will decrease movement and stress on your wound  The splint may help your wound heal faster  Ask your healthcare provider how to apply and remove a splint  · Apply ointments to decrease scarring  Do not apply ointments until your healthcare provider says it is okay  You may need to wait until your wound is healed  Ask which ointment to buy and how often to use it  Wound care:   · Do not get your wound wet until your healthcare provider says it is okay  Do not soak your hand in water  Do not go swimming until your healthcare provider says it is okay  When your healthcare provider says it is okay, carefully wash around the wound with soap and water  Let soap and water run over your wound  Gently pat the area dry or allow it to air dry  · Change your bandages when they get wet, dirty, or after washing  Apply new, clean bandages as directed  Do not apply elastic bandages or tape too tightly  Do not put powders or lotions on your wound  · Apply antibiotic ointment as directed  Your healthcare provider may give you antibiotic ointment to put over your wound if you have stitches  If you have Strips-Strips over your wound, let them dry up and fall off on their own   If they do not fall off within 14 days, gently remove them  If you have glue over your wound, do not remove or pick at it  If your glue comes off, do not replace it with glue that you have at home  · Check your wound every day for signs of infection  Signs of infection include swelling, redness, or pus  Follow up with your doctor or hand specialist in 2 days:  Write down your questions so you remember to ask them during your visits  © Copyright [address] Information is for End User's use only and may not be sold, redistributed or otherwise used for commercial purposes  All illustrations and images included in CareNotes® are the copyrighted property of [employer]  or [address] [place], [place] [address]pe   The above information is an  only  It is not intended as medical advice for individual conditions or treatments  Talk to your doctor, nurse or pharmacist before following any medical regimen to see if it is safe and effective for you

## 2021-06-25 ENCOUNTER — TELEPHONE (OUTPATIENT)
Dept: HEMATOLOGY ONCOLOGY | Facility: CLINIC | Age: 81
End: 2021-06-25

## 2021-06-25 ENCOUNTER — HOSPITAL ENCOUNTER (OUTPATIENT)
Dept: NON INVASIVE DIAGNOSTICS | Facility: HOSPITAL | Age: 81
Discharge: HOME | End: 2021-06-25
Payer: MEDICARE

## 2021-06-25 DIAGNOSIS — R60.0 EDEMA OF LEFT LOWER EXTREMITY: Primary | ICD-10-CM

## 2021-06-25 DIAGNOSIS — R60.0 EDEMA OF LEFT LOWER EXTREMITY: ICD-10-CM

## 2021-06-25 PROCEDURE — 93971 EXTREMITY STUDY: CPT | Performed by: SURGERY

## 2021-06-25 PROCEDURE — 93971 EXTREMITY STUDY: CPT

## 2021-06-25 NOTE — TELEPHONE ENCOUNTER
Patient called again concerned about DVT in her LLL  No redness, Not warm, just more swelling then normal  I told pt to go to ER if she is concerned for a clot  She voiced understanding and stated that she will call urgent care and see if they can do a doppler

## 2021-06-25 NOTE — TELEPHONE ENCOUNTER
The patient phoned and would like to speak with Douglas Caldera PA-C regarding increased swelling of the left> right lower extremity  The patient notes that she has a h/o lymphedema in bilateral lower extremities but also has a h/o DVT and is worried that she may have a clot  I also suggested that she should call her PCPs office regarding this concern and that I would send this message to Jessica Eddy  The patient notes that her best call back number is [phone]

## 2021-07-20 ENCOUNTER — TELEPHONE (OUTPATIENT)
Dept: HEMATOLOGY ONCOLOGY | Facility: CLINIC | Age: 81
End: 2021-07-20

## 2021-07-20 DIAGNOSIS — R60.0 EDEMA OF LEFT LOWER EXTREMITY: Primary | ICD-10-CM

## 2021-07-20 NOTE — TELEPHONE ENCOUNTER
Pt called regarding needing a new Script for Lymphedema Therapist       Stacy Suarez [address] if any question can be called at [phone]

## 2021-07-20 NOTE — TELEPHONE ENCOUNTER
Bolivar with [facility] Occupational therapy calling to request a script for eval and treat for patients Lymphedema  Tre Fox is requesting that on the script it does state OT or Occupational therapy       Tre Fox can be reached at [phone] option 2    RAT:[phone]    Patient was seen about an hour ago

## 2021-08-10 ENCOUNTER — OFFICE VISIT (OUTPATIENT)
Dept: GYNECOLOGIC ONCOLOGY | Facility: CLINIC | Age: 81
End: 2021-08-10
Payer: MEDICARE

## 2021-08-10 VITALS
HEIGHT: 60 IN | HEART RATE: 75 BPM | SYSTOLIC BLOOD PRESSURE: 170 MMHG | DIASTOLIC BLOOD PRESSURE: 50 MMHG | TEMPERATURE: 96.9 F | RESPIRATION RATE: 18 BRPM | BODY MASS INDEX: 26.6 KG/M2 | WEIGHT: 135.5 LBS

## 2021-08-10 DIAGNOSIS — Z85.41 HISTORY OF CERVICAL CANCER: Primary | ICD-10-CM

## 2021-08-10 DIAGNOSIS — Z78.9 NEED FOR FOLLOW-UP BY SOCIAL WORKER: ICD-10-CM

## 2021-08-10 PROCEDURE — 99214 OFFICE O/P EST MOD 30 MIN: CPT | Performed by: OBSTETRICS & GYNECOLOGY

## 2021-08-10 NOTE — ASSESSMENT & PLAN NOTE
52-year-old with a history of recurrent stage I B1 cervical cancer, pulmonary metastatic disease last treated with SBRT in June of 2018  I reviewed her CT results from January of 2021, CBC, CMP from July 17  She is clinically and radiologically without evidence of disease recurrence  She has persistent lymphedema as a sequela of prior therapy  Her performance status is 1   1  She will continue with physical therapy to manage her lymphedema  2  Return in 1 year for cervical cancer surveillance  3  She will follow up with Gastroenterology regarding chronic abdominal pain/bowel dysfunction likely related to her prior radiation therapy and surgery

## 2021-08-10 NOTE — PROGRESS NOTES
Assessment/Plan:    Problem List Items Addressed This Visit        Other    History of cervical cancer - Primary     25-year-old with a history of recurrent stage I B1 cervical cancer, pulmonary metastatic disease last treated with SBRT in June of 2018  I reviewed her CT results from January of 2021, CBC, CMP from July 17  She is clinically and radiologically without evidence of disease recurrence  She has persistent lymphedema as a sequela of prior therapy  Her performance status is 1   1  She will continue with physical therapy to manage her lymphedema  2  Return in 1 year for cervical cancer surveillance  3  She will follow up with Gastroenterology regarding chronic abdominal pain/bowel dysfunction likely related to her prior radiation therapy and surgery  CHIEF COMPLAINT:  Cervical cancer surveillance      Problem:  Cancer Staging  History of cervical cancer  Staging form: Cervix Uteri, AJCC 8th Edition  - Clinical: FIGO Stage IB1 (cT1b1, cM0) - Signed by Jesusita Byrd PA-C on 4/25/2018        Previous therapy:  Oncology History   History of cervical cancer    Initial Diagnosis    Cervical cancer ([facility] [address] )     1/11/2011 Surgery    ALISTAIR-BSO, pelvic and paraaortic lymph node dissection for presumed endometrial cancer  A  3 2 cm SCC, outer third invasion, microscopically positive posterior margin, 32 negative lymph nodes, negative washings      Chemotherapy    Cisplatin with radiation      - 4/11/2011 Radiation    Whole pelvic radiotherapy      Adverse Reaction    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     3/6/2014 Surgery    Right upper lobe resection x 2  A   Recurrent SCC     9/1/2014 Surgery    Thoracotomy and resection of left pulmonary lesions      - 6/2018 Radiation    50Gy to left lung recurrence     Diffuse large B cell lymphoma ([facility] [address] )    Initial Diagnosis    Diffuse large B cell lymphoma ([facility] [address] )      - 12/6/2012 Chemotherapy [facility]           Patient ID: Edie Yousif is a 78 y o  female  78-year-old returns for cervical cancer surveillance  She had a CT scan of the chest abdomen pelvis on 1/29/2021 that did not reveal any evidence of recurrent disease  She sees physical therapy for leg wraps to treat her bilateral lower extremity lymphedema  She has a compression stocking on her right leg and her left lower extremity is wrapped  She continues to have intermittent abdominal cramping and pain  She has struggled with this for quite some time and has adjusted her diet  Recently, it was suggested that she start taking fiber in capsules  This seems to have helped  She has had colonoscopy recently to resect a 3 cm polyp  She has a follow-up scheduled  She goes for ureteral stent exchange every 6 months  No vaginal bleeding  No other interval change in her medications or medical history since her last visit  Labs from 7/17/2021 revealed a serum creatinine of 0 84, normal LFTs, potassium 4 2, white blood cell count 4 8, hemoglobin 12 grams/deciliter and platelet count 194T  She is able to ambulate  Overall, her quality of life is good  The following portions of the patient's history were reviewed and updated as appropriate: allergies, current medications, past family history, past medical history, past social history, past surgical history and problem list     Review of Systems   Constitutional: Negative for activity change and unexpected weight change  HENT: Negative  Eyes: Negative  Respiratory: Negative  Cardiovascular: Positive for leg swelling  Gastrointestinal: Positive for abdominal pain  Negative for abdominal distention  Endocrine: Negative  Genitourinary: Negative for pelvic pain and vaginal bleeding  Musculoskeletal: Negative  Skin: Negative  Allergic/Immunologic: Negative  Neurological: Negative  Hematological: Negative  Psychiatric/Behavioral: Negative          Current Outpatient Medications   Medication Sig Dispense Refill    acetaminophen (TYLENOL) 650 mg CR tablet Take 650 mg by mouth every 8 (eight) hours      ascorbic acid (VITAMIN C) 1000 MG tablet Take 1,000 mg by mouth      aspirin (ASPIR-81) 81 mg EC tablet Take by mouth      CALCIUM-MAGNESIUM-VITAMIN D PO Take by mouth      Cholecalciferol (VITAMIN D3) 2000 units capsule Take 1 tablet by mouth daily      DAILY MULTIPLE VITAMINS PO Take 1 tablet by mouth daily      diazepam (VALIUM) 5 mg tablet Take 2 5 mg by mouth every 6 (six) hours as needed for anxiety      dicyclomine (BENTYL) 10 mg capsule Take 1 capsule by mouth       Garlic 010 MG TABS Take by mouth      Glutathione POWD by Does not apply route      guaiFENesin (MUCINEX) 600 mg 12 hr tablet Take 1,200 mg by mouth      lisinopril (ZESTRIL) 2 5 mg tablet Take 2 5 mg by mouth daily  3    Omega-3 Fatty Acids (FISH OIL) 1,000 mg Take 1,000 mg by mouth daily      omeprazole (PRILOSEC) 20 mg delayed release capsule Take 20 mg by mouth      Probiotic Product (PROBIOTIC-10) CAPS Take by mouth daily      Specialty Vitamins Products (ONE-A-DAY BONE STRENGTH PO) Take by mouth      SYNTHROID 50 MCG tablet       TURMERIC CURCUMIN PO Take by mouth      Ubiquinol 100 MG CAPS Take 1 capsule by mouth       No current facility-administered medications for this visit  Objective:    Blood pressure 170/50, pulse 75, temperature (!) 96 9 °F (36 1 °C), resp  rate 18, height 5' (1 524 m), weight 61 5 kg (135 lb 8 oz)  Body mass index is 26 46 kg/m²  Body surface area is 1 58 meters squared  Physical Exam  Vitals reviewed  Constitutional:       General: She is not in acute distress  Appearance: Normal appearance  She is normal weight  She is not ill-appearing, toxic-appearing or diaphoretic  HENT:      Head: Normocephalic and atraumatic  Eyes:      General: No scleral icterus  Right eye: No discharge  Left eye: No discharge  Conjunctiva/sclera: Conjunctivae normal    Pulmonary:      Effort: Pulmonary effort is normal  No respiratory distress  Breath sounds: No stridor  No wheezing  Abdominal:      General: Abdomen is flat  There is no distension  Palpations: Abdomen is soft  There is no mass  Tenderness: There is no abdominal tenderness  There is no guarding or rebound  Hernia: No hernia is present  Genitourinary:     Comments: Deferred due to significant radiation-induced vaginal stenosis  Musculoskeletal:         General: No deformity  Cervical back: Normal range of motion  No rigidity or tenderness  Right lower leg: Edema present  Left lower leg: Edema present  Lymphadenopathy:      Cervical: No cervical adenopathy  Skin:     General: Skin is warm and dry  Coloration: Skin is not jaundiced  Findings: No rash  Neurological:      General: No focal deficit present  Mental Status: She is alert and oriented to person, place, and time  Cranial Nerves: No cranial nerve deficit  Motor: No weakness  Gait: Gait normal    Psychiatric:         Mood and Affect: Mood normal          Behavior: Behavior normal          Thought Content:  Thought content normal          Judgment: Judgment normal

## 2021-08-17 ENCOUNTER — PATIENT OUTREACH (OUTPATIENT)
Dept: HEMATOLOGY ONCOLOGY | Facility: CLINIC | Age: 81
End: 2021-08-17

## 2021-08-17 NOTE — PROGRESS NOTES
MSW received a Distress Thermometer from [facility]yn [facility], where the pt self scored a 3 to indicate her level of stress  The pt marked off dealing with her partner and sadness as her psychosocial stressors  She indicated 8 out of 21 physical stressors  MSW made outreach due to the stressors she indicated, rather than her score  The pt was open and receptive of this call  She states that she is in remission and doing well  With regards to her stressors, the pt states that she struggled with COVID and is naturally concerned about the variant  MSW spent time listening and offering support  The pt reports no other needs at this time  MSW encouraged her to reach out to the office if she feels she needs any further social work needs

## 2021-12-03 ENCOUNTER — HOSPITAL ENCOUNTER (OUTPATIENT)
Dept: ULTRASOUND IMAGING | Facility: MEDICAL CENTER | Age: 81
Discharge: HOME | End: 2021-12-03
Payer: MEDICARE

## 2021-12-03 DIAGNOSIS — E04.1 NONTOXIC SINGLE THYROID NODULE: ICD-10-CM

## 2021-12-03 PROCEDURE — 76536 US EXAM OF HEAD AND NECK: CPT

## 2021-12-10 ENCOUNTER — OFFICE VISIT (OUTPATIENT)
Dept: SURGICAL ONCOLOGY | Facility: CLINIC | Age: 81
End: 2021-12-10
Payer: MEDICARE

## 2021-12-10 VITALS
BODY MASS INDEX: 26.11 KG/M2 | OXYGEN SATURATION: 100 % | HEART RATE: 62 BPM | DIASTOLIC BLOOD PRESSURE: 70 MMHG | TEMPERATURE: 98.1 F | RESPIRATION RATE: 16 BRPM | SYSTOLIC BLOOD PRESSURE: 130 MMHG | WEIGHT: 133 LBS | HEIGHT: 60 IN

## 2021-12-10 DIAGNOSIS — C79.9 METASTASIS FROM CERVICAL CANCER (HCC): ICD-10-CM

## 2021-12-10 DIAGNOSIS — C53.9 METASTASIS FROM CERVICAL CANCER (HCC): ICD-10-CM

## 2021-12-10 DIAGNOSIS — E04.1 NONTOXIC SINGLE THYROID NODULE: Primary | ICD-10-CM

## 2021-12-10 PROCEDURE — 99213 OFFICE O/P EST LOW 20 MIN: CPT | Performed by: SURGERY

## 2021-12-10 RX ORDER — CYCLOSPORINE 0.5 MG/ML
1 EMULSION OPHTHALMIC EVERY 12 HOURS
COMMUNITY

## 2021-12-10 RX ORDER — CALCIUM POLYCARBOPHIL 625 MG
TABLET ORAL DAILY
COMMUNITY

## 2021-12-10 RX ORDER — FAMOTIDINE 20 MG/1
20 TABLET, FILM COATED ORAL DAILY
COMMUNITY
Start: 2021-10-20

## 2021-12-10 RX ORDER — FLUTICASONE PROPIONATE 50 MCG
2 SPRAY, SUSPENSION (ML) NASAL AS NEEDED
COMMUNITY

## 2021-12-10 RX ORDER — METRONIDAZOLE 7.5 MG/G
LOTION TOPICAL
COMMUNITY

## 2022-01-05 ENCOUNTER — TELEPHONE (OUTPATIENT)
Dept: HEMATOLOGY ONCOLOGY | Facility: CLINIC | Age: 82
End: 2022-01-05

## 2022-01-05 NOTE — TELEPHONE ENCOUNTER
Patient called [facility] she stated she needs to new script to be sent to [address] for lymphademia  She has a new therapist: Logan Cool  Fax Number: [phone] Phone Number: [phone]   Her appointment is on 1/10/22

## 2022-03-21 ENCOUNTER — HOSPITAL ENCOUNTER (OUTPATIENT)
Dept: MAMMOGRAPHY | Facility: MEDICAL CENTER | Age: 82
Discharge: HOME | End: 2022-03-21
Payer: MEDICARE

## 2022-03-21 VITALS — WEIGHT: 133 LBS | BODY MASS INDEX: 26.11 KG/M2 | HEIGHT: 60 IN

## 2022-03-21 DIAGNOSIS — Z12.31 SCREENING MAMMOGRAM, ENCOUNTER FOR: ICD-10-CM

## 2022-03-21 PROCEDURE — 77067 SCR MAMMO BI INCL CAD: CPT

## 2022-03-21 PROCEDURE — 77063 BREAST TOMOSYNTHESIS BI: CPT

## 2022-04-27 ENCOUNTER — TELEPHONE (OUTPATIENT)
Dept: SURGICAL ONCOLOGY | Facility: CLINIC | Age: 82
End: 2022-04-27

## 2022-04-27 NOTE — TELEPHONE ENCOUNTER
Patient is calling with a fax number to have a script sent over      OT LYMPHOEDEMA EVAL AND TREAT    FAX # [phone]      Patients appt is May 3 with Yeimy to evaluate

## 2022-04-28 DIAGNOSIS — I89.0 LYMPHEDEMA OF BOTH LOWER EXTREMITIES: Primary | ICD-10-CM

## 2022-09-02 ENCOUNTER — TELEPHONE (OUTPATIENT)
Dept: GYNECOLOGIC ONCOLOGY | Facility: CLINIC | Age: 82
End: 2022-09-02

## 2022-09-02 DIAGNOSIS — I89.0 LYMPHEDEMA OF BOTH LOWER EXTREMITIES: Primary | ICD-10-CM

## 2022-09-02 NOTE — TELEPHONE ENCOUNTER
Panchito Singleton from Occupational Therapy called in regarding a new order for an a referral to PT/OT lymphedema therapy for the patient  Panchito Singleton asked the order to be faxed over to fax number [phone]   Any questions please give Panchito Singleton a call back @ [phone] Option 2

## 2022-09-09 ENCOUNTER — TELEPHONE (OUTPATIENT)
Dept: GYNECOLOGIC ONCOLOGY | Facility: CLINIC | Age: 82
End: 2022-09-09

## 2022-09-09 NOTE — TELEPHONE ENCOUNTER
Patient called in to r/s her appt with Dr Dank Christie as a result of Dr Max Arauz being out of the office the day of her original appt   Patient is scheduled for Sept 26 2022  @ 3:00pm

## 2022-09-26 ENCOUNTER — OFFICE VISIT (OUTPATIENT)
Dept: GYNECOLOGIC ONCOLOGY | Facility: CLINIC | Age: 82
End: 2022-09-26
Payer: MEDICARE

## 2022-09-26 VITALS
DIASTOLIC BLOOD PRESSURE: 92 MMHG | WEIGHT: 133 LBS | OXYGEN SATURATION: 97 % | HEART RATE: 79 BPM | BODY MASS INDEX: 26.11 KG/M2 | SYSTOLIC BLOOD PRESSURE: 144 MMHG | RESPIRATION RATE: 16 BRPM | TEMPERATURE: 96.9 F | HEIGHT: 60 IN

## 2022-09-26 DIAGNOSIS — K52.9 INFLAMMATORY BOWEL DISEASE: Primary | ICD-10-CM

## 2022-09-26 DIAGNOSIS — Z85.41 HISTORY OF CERVICAL CANCER: ICD-10-CM

## 2022-09-26 DIAGNOSIS — K52.0 RADIATION ENTERITIS: ICD-10-CM

## 2022-09-26 PROCEDURE — 99214 OFFICE O/P EST MOD 30 MIN: CPT | Performed by: OBSTETRICS & GYNECOLOGY

## 2022-09-26 RX ORDER — ECHINACEA PURPUREA EXTRACT 125 MG
1 TABLET ORAL DAILY
COMMUNITY

## 2022-09-26 NOTE — PROGRESS NOTES
Assessment/Plan:    Problem List Items Addressed This Visit        Digestive    Radiation enteritis    Relevant Orders    Ambulatory referral to Gastroenterology       Other    History of cervical cancer     78year-old with a history of recurrent stage I B1 cervical cancer, pulmonary metastatic disease last treated with SBRT in June of 2018  I reviewed her CT results from February of 2022, colonoscopy results from 9/23/2022, office notes  She is clinically and radiologically without evidence of disease recurrence  She has persistent left lower extremity lymphedema from treatment  She has symptoms consistent with radiation enteritis or IBD and has been struggling with potential treatment options and dietary changes  Her performance status is 1    1  Given her ongoing bowel dysfunction with evidence of intermittent enteritis, inflammation based on imaging, it is 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             Inflammatory bowel disease - Primary    Relevant Orders    Ambulatory referral to Gastroenterology            CHIEF COMPLAINT:  Intermittent vomiting, abdominal pain      Problem:  Cancer Staging  History of cervical cancer  Staging form: Cervix Uteri, AJCC 8th Edition  - Clinical: FIGO Stage IB1 (cT1b1, cM0) - Signed by Abigail German PA-C on 4/25/2018        Previous therapy:  Oncology History   History of cervical cancer    Initial Diagnosis    Cervical cancer ([facility] [address] )     1/11/2011 Surgery    ALISTAIR-BSO, pelvic and paraaortic lymph node dissection for presumed endometrial cancer  A  3 2 cm SCC, outer third invasion, microscopically positive posterior margin, 32 negative lymph nodes, negative washings      Chemotherapy    Cisplatin with radiation      - 4/11/2011 Radiation    Whole pelvic radiotherapy      Adverse Reaction    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     3/6/2014 Surgery    Right upper lobe resection x 2  A  Recurrent SCC     9/1/2014 Surgery    Thoracotomy and resection of left pulmonary lesions      - 6/2018 Radiation    50Gy to left lung recurrence     Diffuse large B cell lymphoma ([facility] [address] )    Initial Diagnosis    Diffuse large B cell lymphoma ([facility] [address] )      - 12/6/2012 Chemotherapy    R-CHOP           Patient ID: Sharifa Castro is a 78 y o  female  Who returns for cervical cancer surveillance  She does not have any vaginal bleeding  Her main complaint is intermittent severe abdominal pain with vomiting  She presented to the ED in August of 2022 and had an abdominal film which revealed enterocolitis  She has struggled for many years with her bowels after receiving radiation therapy and requiring a colostomy due to sigmoid perforation from radiation  She had a colonoscopy this month  A tubular adenoma was removed  She has follow-up colonoscopy planned  She has been trying multiple different diets and is now trying the [place] for radiation enteritis  She started this 1 week ago  She is not yet had an issue with severe pain and vomiting  She does not have blood in the stool  She continues with physical therapy for her lymphedema  She has a new leg wrap on  She is also been having some difficulties at home caring for her  who is 78years old  Her last CT of the chest abdomen pelvis was in February of 2022 and there was no evidence of recurrent disease identified        The following portions of the patient's history were reviewed and updated as appropriate: allergies, current medications, past family history, past medical history, past social history, past surgical history and problem list     Review of Systems   Constitutional: Positive for unexpected weight change  Negative for activity change  4lb weight loss   HENT: Negative  Eyes: Negative  Respiratory: Negative  Cardiovascular: Negative  Gastrointestinal: Positive for abdominal distention, abdominal pain, constipation, diarrhea and vomiting  Endocrine: Negative  Genitourinary: Negative for pelvic pain and vaginal bleeding  Musculoskeletal: Positive for arthralgias  Skin: Negative  Allergic/Immunologic: Negative  Neurological: Negative  Hematological: Negative  Psychiatric/Behavioral: Negative          Current Outpatient Medications   Medication Sig Dispense Refill    acetaminophen (TYLENOL) 650 mg CR tablet Take 650 mg by mouth every 8 (eight) hours as needed        ascorbic acid (VITAMIN C) 1000 MG tablet Take 1,000 mg by mouth      aspirin (ECOTRIN LOW STRENGTH) 81 mg EC tablet Take by mouth      Calcium Polycarbophil (Fiber) 625 MG TABS Take by mouth daily      CALCIUM-MAGNESIUM-VITAMIN D PO Take by mouth      Cholecalciferol (VITAMIN D3) 2000 units capsule Take 1 tablet by mouth continuous as needed        cycloSPORINE (Restasis) 0 05 % ophthalmic emulsion Administer 1 drop to both eyes every 12 (twelve) hours        DAILY MULTIPLE VITAMINS PO Take 1 tablet by mouth daily      diazepam (VALIUM) 5 mg tablet Take 2 5 mg by mouth every 6 (six) hours as needed for anxiety      dicyclomine (BENTYL) 10 mg capsule Take 1 capsule by mouth      docusate sodium (COLACE) 50 mg capsule Take by mouth 2 (two) times a day        famotidine (PEPCID) 20 mg tablet Take 20 mg by mouth daily        Garlic 841 MG TABS Take by mouth      Glutathione POWD Take by mouth daily        lisinopril (ZESTRIL) 2 5 mg tablet Take 2 5 mg by mouth daily  3    METRONIDAZOLE, TOPICAL, 0 75 % LOTN metronidazole 0 75 % lotion   APPLY 1 2 TIMES DAILY TO FACE 90 DAY SUPPLY      Omega-3 Fatty Acids (FISH OIL) 1,000 mg Take 1,000 mg by mouth daily      Probiotic Product (PROBIOTIC-10) CAPS Take by mouth daily      sodium chloride (OCEAN) 0 65 % nasal spray 1 spray into each nostril daily      Specialty Vitamins Products (ONE-A-DAY BONE STRENGTH PO) Take 3 capsules by mouth daily        SYNTHROID 50 MCG tablet Take 50 mcg by mouth daily        TURMERIC CURCUMIN PO Take by mouth      Ubiquinol 100 MG CAPS Take 1 capsule by mouth daily        fluticasone (FLONASE) 50 mcg/act nasal spray 2 sprays into each nostril as needed (Patient not taking: Reported on 9/26/2022)      guaiFENesin (MUCINEX) 600 mg 12 hr tablet Take 1,200 mg by mouth (Patient not taking: No sig reported)      omeprazole (PriLOSEC) 20 mg delayed release capsule Take 20 mg by mouth (Patient not taking: No sig reported)       No current facility-administered medications for this visit  Objective:    Blood pressure 144/92, pulse 79, temperature (!) 96 9 °F (36 1 °C), temperature source Temporal, resp  rate 16, height 5' (1 524 m), weight 60 3 kg (133 lb), SpO2 97 %  Body mass index is 25 97 kg/m²  Body surface area is 1 57 meters squared  Physical Exam  Vitals reviewed  Constitutional:       General: She is not in acute distress  Appearance: Normal appearance  She is normal weight  She is not ill-appearing, toxic-appearing or diaphoretic  HENT:      Head: Normocephalic and atraumatic  Eyes:      General: No scleral icterus  Right eye: No discharge  Left eye: No discharge  Conjunctiva/sclera: Conjunctivae normal    Pulmonary:      Effort: Pulmonary effort is normal    Abdominal:      General: Abdomen is flat  Musculoskeletal:      Right lower leg: Edema present  Left lower leg: Edema present  Skin:     General: Skin is warm and dry  Coloration: Skin is not jaundiced  Findings: No rash  Neurological:      General: No focal deficit present        Mental Status: She is alert and oriented to person, place, and time  Cranial Nerves: No cranial nerve deficit  Sensory: No sensory deficit  Motor: No weakness  Gait: Gait normal    Psychiatric:         Mood and Affect: Mood normal          Behavior: Behavior normal          Thought Content:  Thought content normal          Judgment: Judgment normal

## 2022-09-26 NOTE — ASSESSMENT & PLAN NOTE
78-year-old with a history of recurrent stage I B1 cervical cancer, pulmonary metastatic disease last treated with SBRT in June of 2018  I reviewed her CT results from February of 2022, colonoscopy results from 9/23/2022, office notes  She is clinically and radiologically without evidence of disease recurrence  She has persistent left lower extremity lymphedema from treatment  She has symptoms consistent with radiation enteritis or IBD and has been struggling with potential treatment options and dietary changes  Her performance status is 1    1  Given her ongoing bowel dysfunction with evidence of intermittent enteritis, inflammation based on imaging, it is 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

## 2022-10-18 ENCOUNTER — CONSULT (OUTPATIENT)
Dept: GASTROENTEROLOGY | Facility: CLINIC | Age: 82
End: 2022-10-18
Payer: MEDICARE

## 2022-10-18 VITALS
WEIGHT: 129 LBS | BODY MASS INDEX: 25.19 KG/M2 | TEMPERATURE: 97.8 F | DIASTOLIC BLOOD PRESSURE: 70 MMHG | SYSTOLIC BLOOD PRESSURE: 128 MMHG

## 2022-10-18 DIAGNOSIS — K52.9 INFLAMMATORY BOWEL DISEASE: ICD-10-CM

## 2022-10-18 DIAGNOSIS — K52.0 RADIATION ENTERITIS: ICD-10-CM

## 2022-10-18 DIAGNOSIS — R11.2 NAUSEA AND VOMITING, UNSPECIFIED VOMITING TYPE: ICD-10-CM

## 2022-10-18 DIAGNOSIS — R10.30 LOWER ABDOMINAL PAIN: Primary | ICD-10-CM

## 2022-10-18 PROCEDURE — 99204 OFFICE O/P NEW MOD 45 MIN: CPT | Performed by: INTERNAL MEDICINE

## 2022-10-18 RX ORDER — POLYETHYLENE GLYCOL 3350 17 G/17G
17 POWDER, FOR SOLUTION ORAL DAILY
Qty: 30 EACH | Refills: 0 | Status: SHIPPED | OUTPATIENT
Start: 2022-10-18 | End: 2022-10-18

## 2022-10-18 NOTE — PROGRESS NOTES
Consultation - [address] [facility]  Funmilayo Swartz 78 y o  female MRN: [id number]  @ Encounter: [id number]    ASSESSMENT AND PLAN:      79 yo F w pmhx of B cell lymphoma s/p chemo/radiation, cervical cancer with mets to lungs treated with SBRT in remision, MAXINE, GERD on PPI, pepcid, s/p hemicolectomy w colostomy seen for possible radation related colitis/enteritis  1  Lower abdominal pain  Unclear etiology, may be due to constipation, strictures from prior surgery  · Continue Colace daily, if no benefit consider MiraLax daily  · Low fiber diet  · Recommended softer diet, liquids when she has attacks of lower abdominal pain, cramping  · CT chest abdomen pelvis/enterography with IV and p o  contrast in February to assess for stricturing, small-bowel issues  · Instructed to go to the ED if there is an acute change in symptoms and she is not able to tolerate p o  Hanley Zaki · Follow-up in 3 months    2  Nausea, vomiting  Likely sequela from adhesions/stricturing from prior abdominal surgeries  • Management as described above  3  Iron deficiency Anemia  Chronic issue, no signs of overt bleeding  Multiple endoscopies performed in the last year  Possibly due to AVM disease noted on recent EGD  · Continue outpatient iron infusions per hematology  · Monitor hemoglobin    4  GERD  Chronic issue, stable  · Continue Pepcid  · Continue Prilosec     Follow up in 3 months  Thank you for this consultation  ______________________________________________________________________    HPI:   79 yo F w pmhx of B cell lymphoma s/p chemo/radiation, cervical cancer with mets to lungs treated with SBRT in remision, MAXINE, GERD on PPI, pepcid, s/p hemicolectomy w colostomy seen for possible radation related colitis/enteritis  Had cramping and vomiting after colon sx  Has intermittent constipation, diarrhea  Usually nocturnal symptoms, now goes into the morning  Has N/V every 2 months  Has cramping, then vomiting  Continues to have output from ostomy, sometimes formed, sometimes loose  No blood  Had 5 colonoscopies in 6338-6243 for 3 cm polyp which was a TA, removed piecemeal over multiple procedures  6 chemo tx, 33 radiation treatment to the pelvis finishing in 2011 for cervical cancer  Radiation tx 2015 for B cell lymphoma to upper back  Last radiation in 2018 to lung for mets  S/p Hysterectomy  S/p Appendectomy in 1984  S/p Hemicolectomy (for LBO? 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  Last Colonoscopy:  2021, multiple performed for piecemeal resection of 3 centimeter tubular adenoma  REVIEW OF SYSTEMS:    Review of Systems   Constitutional: Negative for chills and fever  HENT: Negative for congestion and sinus pressure  Respiratory: Negative for cough and shortness of breath  Cardiovascular: Negative for chest pain, palpitations and leg swelling  Gastrointestinal: Positive for abdominal pain and vomiting  Negative for diarrhea and nausea  Genitourinary: Negative for dysuria and hematuria  Musculoskeletal: Negative for arthralgias and back pain  Skin: Negative for color change and rash  Neurological: Negative for dizziness and headaches  Psychiatric/Behavioral: Negative for agitation and confusion  All other systems reviewed and are negative         Historical Information   Past Medical History:   Diagnosis Date   • Cervical cancer ([facility] [address] ) 01/16/2011   • History of chemotherapy     age 65   • History of DVT (deep vein thrombosis)    • History of radiation therapy     age 65     Past Surgical History:   Procedure Laterality Date   • BREAST EXCISIONAL BIOPSY Left 1985    benign   • HYSTERECTOMY      age71   • OOPHORECTOMY Bilateral     age 65     Social History   Social History     Substance and Sexual Activity   Alcohol Use No Social History     Substance and Sexual Activity   Drug Use No     Social History     Tobacco Use   Smoking Status Never Smoker   Smokeless Tobacco Never Used     Family History   Problem Relation Age of Onset   • No Known Problems Mother    • No Known Problems Father    • No Known Problems Daughter    • No Known Problems Maternal Grandmother    • No Known Problems Maternal Grandfather    • No Known Problems Paternal Grandmother    • No Known Problems Paternal Grandfather        Meds/Allergies     (Not in a hospital admission)    No current facility-administered medications for this visit  Allergies   Allergen Reactions   • Contrast [Iodinated Diagnostic Agents] Diarrhea     Severe! • Iron Sucrose Other (See Comments)     Specifically venafor brand; labored breathing, chest discomfort, weakness, facial flush  Therahem is ok   • Bee Pollen Allergic Rhinitis   • Other Other (See Comments) and Itching     BAND AIDES RASH  Blisters   • Oxybutynin Rash   • Pollen Extract Allergic Rhinitis and Other (See Comments)   • Sulfa Antibiotics Rash       Objective     Blood pressure 128/70, temperature 97 8 °F (36 6 °C), temperature source Tympanic, weight 58 5 kg (129 lb)  Body mass index is 25 19 kg/m²  [unfilled]      PHYSICAL EXAM:      Physical Exam  Vitals and nursing note reviewed  Constitutional:       General: She is not in acute distress  Appearance: Normal appearance  She is well-developed  She is not ill-appearing  HENT:      Head: Normocephalic and atraumatic  Mouth/Throat:      Mouth: Mucous membranes are moist    Eyes:      Extraocular Movements: Extraocular movements intact  Conjunctiva/sclera: Conjunctivae normal       Pupils: Pupils are equal, round, and reactive to light  Cardiovascular:      Rate and Rhythm: Normal rate and regular rhythm  Pulses: Normal pulses  Heart sounds: Normal heart sounds  No murmur heard  No friction rub  No gallop     Pulmonary: Effort: Pulmonary effort is normal  No respiratory distress  Breath sounds: Normal breath sounds  No wheezing or rales  Abdominal:      General: Abdomen is flat  Bowel sounds are normal  There is no distension  Palpations: Abdomen is soft  Tenderness: There is no abdominal tenderness  There is no guarding  Comments: Colostomy noted  Musculoskeletal:      Cervical back: Neck supple  Right lower leg: No edema  Left lower leg: No edema  Skin:     General: Skin is warm and dry  Neurological:      General: No focal deficit present  Mental Status: She is alert and oriented to person, place, and time  Psychiatric:         Mood and Affect: Mood normal          Behavior: Behavior normal          Lab Results:   No visits with results within 1 Day(s) from this visit  Latest known visit with results is:   No results found for any previous visit  Imaging Studies: I have personally reviewed pertinent imaging studies  [address] [facility] [address]    Gastroenterology Fellow  PGY-4  Available via [employer]  10/18/2022 9:57 AM

## 2022-10-18 NOTE — PROGRESS NOTES
· Oncology/Hematology/Hemophilia History Overview Note   Diagnoses:  1  Non-Hodgkin's lymphoma, follicular lymphoma subtype, stage IA, with concern for high-grade large cell component  Previous Therapy For Lymphoma:  1  R-CHOP x3 cycles, last does given 09/06/2012  2  Consolidative radiation therapy to the mass finishing in 02/2013    2  Recurrent squamous cell carcinoma of the cervix with biopsy proven pulmonary nodules, status post resection in 09/2014   a  recurrence in resected pulmonary nodules s/p SBRT completed 7/8/68    Follicular lymphoma grade I ([address] )   2/7/2017 Initial Diagnosis   Follicular lymphoma grade I (HCC)    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

## 2022-10-19 ENCOUNTER — TELEPHONE (OUTPATIENT)
Dept: HEMATOLOGY ONCOLOGY | Facility: CLINIC | Age: 82
End: 2022-10-19

## 2022-10-19 ENCOUNTER — TELEPHONE (OUTPATIENT)
Dept: SURGICAL ONCOLOGY | Facility: CLINIC | Age: 82
End: 2022-10-19

## 2022-10-19 NOTE — TELEPHONE ENCOUNTER
Spoke to patient, she wanted to move her yearly f/u with Dr Corey Canavan up a few weeks because [facility]  Patient will call and r/s the [place] at least 4-5 days before this new date  Patient verbalized understanding and thanks

## 2022-10-19 NOTE — TELEPHONE ENCOUNTER
Patient called back and decided to keep her [address] on 12/5  We r/s appt with Dr Jeremias Capps to 12/7 at 1:45pm  Patient verbalized understanding and thanks

## 2022-10-19 NOTE — TELEPHONE ENCOUNTER
CALL TRANSFER   Reason for patient call? Patient calling about scheduled procedures   Patient's primary physician? Raheel Martin RN call was transferred to and time it was transferred? Cristina Man 12:43PM 10/19   Informed patient that the message will be forwarded to the team and someone will get back to them as soon as possible    Did you relay this information to the patient?   yes

## 2022-11-01 ENCOUNTER — TELEPHONE (OUTPATIENT)
Dept: OTHER | Facility: OTHER | Age: 82
End: 2022-11-01

## 2022-11-01 NOTE — TELEPHONE ENCOUNTER
Pt called in stating the dr told her she would receive 2 copies of her report in the mail  She hasn't received it

## 2022-11-27 ENCOUNTER — NURSE TRIAGE (OUTPATIENT)
Dept: OTHER | Facility: OTHER | Age: 82
End: 2022-11-27

## 2022-11-27 NOTE — TELEPHONE ENCOUNTER
Reason for Disposition  • Vomiting is a chronic symptom (recurrent or ongoing AND present > 4 weeks)    Answer Assessment - Initial Assessment Questions  1  VOMITING SEVERITY: "How many times have you vomited in the past 24 hours?"      - MILD:  1 - 2 times/day     - MODERATE: 3 - 5 times/day, decreased oral intake without significant weight loss or symptoms of dehydration     - SEVERE: 6 or more times/day, vomits everything or nearly everything, with significant weight loss, symptoms of dehydration   2 times in the last 24hrs    2  ONSET: "When did the vomiting begin?"      11/26    3  FLUIDS: "What fluids or food have you vomited up today?" "Have you been able to keep any fluids down?"  No fluids and food yet today  Pt will try to drink sips of water    4  ABDOMINAL PAIN: "Are your having any abdominal pain?" If yes : "How bad is it and what does it feel like?" (e g , crampy, dull, intermittent, constant)   Yes cramping pain     5  DIARRHEA: "Is there any diarrhea?" If Yes, ask: "How many times today?"    no    6  CONTACTS: "Is there anyone else in the family with the same symptoms?"   No    7  CAUSE: "What do you think is causing your vomiting?"      Unknown    8  HYDRATION STATUS: "Any signs of dehydration?" (e g , dry mouth [not only dry lips], too weak to stand) "When did you last urinate?"   only dry lips  No other signs  Last urinated 0900    9  OTHER SYMPTOMS: "Do you have any other symptoms?" (e g , fever, headache, vertigo, vomiting blood or coffee grounds, recent head injury)  No    10  PREGNANCY: "Is there any chance you are pregnant?" "When was your last menstrual period?"  No    Protocols used: VOMITING-ADULT-AH    Taking stool softeners and low residue diet  Message sent to office and provider to f/u with patient

## 2022-11-27 NOTE — TELEPHONE ENCOUNTER
Regarding: cramps/vomiting  ----- Message from Lena Sanches sent at 11/27/2022  8:39 AM EST -----  " I am having such stomach pain and cramps  I vomited all night long   Not sure what I should do ?"

## 2022-11-29 NOTE — TELEPHONE ENCOUNTER
Spoke with patient  Saturday night she had stomach cramping that lasted all night along with 2 episodes of vomiting  The following day she was on a clear liquid diet  She has advanced her diet today with eggs and toast  No further episodes of vomiting, denies stomach cramping  Taking daily colace and has noticed output is small and formed  Patient would like to try Miralax as per office note  Pt will update office if symptoms reoccur  3 month f/u appointment scheduled

## 2022-12-02 NOTE — TELEPHONE ENCOUNTER
Pt called in requesting a call back from [facility] before she has to leave the house at 2:30pm

## 2022-12-02 NOTE — TELEPHONE ENCOUNTER
Patient called to give updates  She leaked through her ostomy pouch last night with liquid stool accompinied by a firm stool and states " the firm stool was like a plug " She had taken a 1/3 cap Miralax that evening  Denies vomiting or stomach cramping  She will continue to take daily 50 mg stool softeners and if she feels her stool becoming firm she will take Miralax PRN  She will continue a soft bland diet, patient normally does not eat nuts, beef, and eats small meals  She is going on a trip down [address] in a few weeks, encouraged patient to [facility] provider and she can call office prior to trip with any questions

## 2022-12-05 ENCOUNTER — HOSPITAL ENCOUNTER (OUTPATIENT)
Dept: ULTRASOUND IMAGING | Facility: MEDICAL CENTER | Age: 82
Discharge: HOME | End: 2022-12-05

## 2022-12-05 DIAGNOSIS — E04.1 NONTOXIC SINGLE THYROID NODULE: ICD-10-CM

## 2022-12-07 ENCOUNTER — OFFICE VISIT (OUTPATIENT)
Dept: SURGICAL ONCOLOGY | Facility: CLINIC | Age: 82
End: 2022-12-07

## 2022-12-07 VITALS
DIASTOLIC BLOOD PRESSURE: 82 MMHG | SYSTOLIC BLOOD PRESSURE: 178 MMHG | WEIGHT: 129 LBS | HEART RATE: 79 BPM | TEMPERATURE: 98.7 F | OXYGEN SATURATION: 99 % | BODY MASS INDEX: 25.32 KG/M2 | HEIGHT: 60 IN

## 2022-12-07 DIAGNOSIS — E04.1 NONTOXIC SINGLE THYROID NODULE: Primary | ICD-10-CM

## 2022-12-07 NOTE — LETTER
December 7, 2022     Moriah Goldman MD  [address]    Patient: Radha Busch   YOB: 1940   Date of Visit: 12/7/2022       Dear Dr Eliot Hogan: Thank you for referring Avinsahjarred Kevin to me for evaluation  Below are my notes for this consultation  If you have questions, please do not hesitate to call me  I look forward to following your patient along with you  Sincerely,        Francis Robles MD        CC: MD Citlaly Limon MD Lucia Savoy, MD Francis Pace MD  12/7/2022  2:50 PM  Sign when Signing Visit     Surgical Oncology Follow Up       [address] [address]    [facility]  1940  [id number]  [facility]  [facility] [address] [address]    Chief Complaint   Patient presents with   • Follow-up     Pt presents for a f/u on Nontoxic single thyroid nodule  Pt is questioning her neck size  Pt reports left side looks larger then right       Assessment/Plan:    No problem-specific Assessment & Plan notes found for this encounter  Diagnoses and all orders for this visit:    Nontoxic single thyroid nodule       Advance Care Planning/Advance Directives:  Discussed disease status, cancer treatment plans and/or cancer treatment goals with the patient       Oncology History   History of cervical cancer    Initial Diagnosis    Cervical cancer ([facility] [address] )     1/11/2011 Surgery    ALISTAIR-BSO, pelvic and paraaortic lymph node dissection for presumed endometrial cancer  A  3 2 cm SCC, outer third invasion, microscopically positive posterior margin, 32 negative lymph nodes, negative washings      Chemotherapy    Cisplatin with radiation      - 4/11/2011 Radiation    Whole pelvic radiotherapy      Adverse Reaction    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     3/6/2014 Surgery    Right upper lobe resection x 2  A  Recurrent SCC     9/1/2014 Surgery    Thoracotomy and resection of left pulmonary lesions      - 6/2018 Radiation    50Gy to left lung recurrence     Diffuse large B cell lymphoma ([facility] [address] )    Initial Diagnosis    Diffuse large B cell lymphoma ([facility] [address] )      - 12/6/2012 Chemotherapy    R-CHOP         History of Present Illness: 58-year-old woman with history of thyroid nodules which are the following the last several years  Here for surveillance visit no new thyroid complaints to report  -Interval History: She has ultrasound done recently in anticipation of today's visit  Review of Systems:  Review of Systems   Constitutional: Negative  HENT: Negative  Eyes: Negative  Respiratory: Negative  Cardiovascular: Negative  Gastrointestinal: Negative  Endocrine: Negative  Genitourinary: Negative  Musculoskeletal: Negative  Skin: Negative  Allergic/Immunologic: Negative  Neurological: Negative  Hematological: Negative  Psychiatric/Behavioral: Negative          Patient Active Problem List   Diagnosis   • History of DVT (deep vein thrombosis)   • History of cervical cancer   • Diffuse large B cell lymphoma (HCC)   • Nontoxic single thyroid nodule   • Abdominal pain of multiple sites   • Chemotherapy follow-up examination   • Compression fracture of third lumbar vertebra with routine healing   • Depression   • Edema of left lower extremity   • Benign essential hypertension   • Hypertension   • Hypothyroidism   • Lymphedema   • Nodule of left lung   • Other diseases of lung, not elsewhere classified   • Renal insufficiency   • Encounter for follow-up surveillance of cervical cancer   • Metastasis from cervical cancer [facility])   • Inflammatory bowel disease   • Radiation enteritis     Past Medical History:   Diagnosis Date   • Cervical cancer ([facility] [address] ) 01/16/2011   • History of chemotherapy     age 65   • History of DVT (deep vein thrombosis)    • History of radiation therapy     age 65     Past Surgical History:   Procedure Laterality Date   • BREAST EXCISIONAL BIOPSY Left 1985    benign   • HYSTERECTOMY      age69   • OOPHORECTOMY Bilateral     age 65     Family History   Problem Relation Age of Onset   • No Known Problems Mother    • No Known Problems Father    • No Known Problems Daughter    • No Known Problems Maternal Grandmother    • No Known Problems Maternal Grandfather    • No Known Problems Paternal Grandmother    • No Known Problems Paternal Grandfather      Social History     Socioeconomic History   • Marital status: /Civil Union     Spouse name: Not on file   • Number of children: Not on file   • Years of education: Not on file   • Highest education level: Not on file   Occupational History   • Not on file   Tobacco Use   • Smoking status: Never   • Smokeless tobacco: Never   Vaping Use   • Vaping Use: Not on file   Substance and Sexual Activity   • Alcohol use: No   • Drug use: No   • Sexual activity: Not on file   Other Topics Concern   • Not on file   Social History Narrative   • Not on file     Social Determinants of Health     Financial Resource Strain: Not on file   Food Insecurity: Not on file   Transportation Needs: Not on file   Physical Activity: Not on file   Stress: Not on file   Social Connections: Not on file   Intimate Partner Violence: Not on file   Housing Stability: Not on file       Current Outpatient Medications:   •  acetaminophen (TYLENOL) 650 mg CR tablet, Take 650 mg by mouth every 8 (eight) hours as needed  , Disp: , Rfl:   •  ascorbic acid (VITAMIN C) 1000 MG tablet, Take 1,000 mg by mouth, Disp: , Rfl:   •  aspirin (ECOTRIN LOW STRENGTH) 81 mg EC tablet, Take by mouth, Disp: , Rfl:   •  Calcium Polycarbophil (Fiber) 625 MG TABS, Take by mouth daily, Disp: , Rfl:   •  CALCIUM-MAGNESIUM-VITAMIN D PO, Take by mouth, Disp: , Rfl:   • Cholecalciferol (VITAMIN D3) 2000 units capsule, Take 1 tablet by mouth continuous as needed  , Disp: , Rfl:   •  cycloSPORINE (Restasis) 0 05 % ophthalmic emulsion, Administer 1 drop to both eyes every 12 (twelve) hours  , Disp: , Rfl:   •  DAILY MULTIPLE VITAMINS PO, Take 1 tablet by mouth daily, Disp: , Rfl:   •  diazepam (VALIUM) 5 mg tablet, Take 2 5 mg by mouth every 6 (six) hours as needed for anxiety, Disp: , Rfl:   •  dicyclomine (BENTYL) 10 mg capsule, Take 1 capsule by mouth, Disp: , Rfl:   •  docusate sodium (COLACE) 50 mg capsule, Take by mouth 2 (two) times a day  , Disp: , Rfl:   •  Docusate Sodium (STOOL SOFTENER LAXATIVE PO), Take by mouth 50mg, Disp: , Rfl:   •  famotidine (PEPCID) 20 mg tablet, Take 20 mg by mouth daily  , Disp: , Rfl:   •  fluticasone (FLONASE) 50 mcg/act nasal spray, 2 sprays into each nostril as needed, Disp: , Rfl:   •  Garlic 716 MG TABS, Take by mouth, Disp: , Rfl:   •  Glutathione POWD, Take by mouth daily  , Disp: , Rfl:   •  guaiFENesin (MUCINEX) 600 mg 12 hr tablet, Take 1,200 mg by mouth, Disp: , Rfl:   •  lisinopril (ZESTRIL) 2 5 mg tablet, Take 2 5 mg by mouth daily, Disp: , Rfl: 3  •  METRONIDAZOLE, TOPICAL, 0 75 % LOTN, metronidazole 0 75 % lotion  APPLY 1 2 TIMES DAILY TO FACE 90 DAY SUPPLY, Disp: , Rfl:   •  Omega-3 Fatty Acids (FISH OIL) 1,000 mg, Take 1,000 mg by mouth daily, Disp: , Rfl:   •  omeprazole (PriLOSEC) 20 mg delayed release capsule, Take 20 mg by mouth, Disp: , Rfl:   •  Probiotic Product (PROBIOTIC-10) CAPS, Take by mouth daily, Disp: , Rfl:   •  sodium chloride (OCEAN) 0 65 % nasal spray, 1 spray into each nostril daily, Disp: , Rfl:   •  Specialty Vitamins Products (ONE-A-DAY BONE STRENGTH PO), Take 3 capsules by mouth daily  , Disp: , Rfl:   •  SYNTHROID 50 MCG tablet, Take 50 mcg by mouth daily  , Disp: , Rfl:   •  TURMERIC CURCUMIN PO, Take by mouth, Disp: , Rfl:   •  Ubiquinol 100 MG CAPS, Take 1 capsule by mouth daily  , Disp: , Rfl: Allergies   Allergen Reactions   • Iodinated Diagnostic Agents Diarrhea     Severe! Severe   • Iron Sucrose Other (See Comments), Anaphylaxis and Shortness Of Breath     Specifically venafor brand; labored breathing, chest discomfort, weakness, facial flush  Therahem is ok  Specifically Venofer brand; labored breathing, chest discomfort, weakness, facial flush  Therahem is ok   • Bee Pollen Allergic Rhinitis   • Medical Tape Itching   • Misc  Sulfonamide Containing Compounds Hives   • Other Other (See Comments) and Itching     BAND AIDES RASH  Blisters   • Wound Dressing Adhesive Other (See Comments)     Blisters   • Oxybutynin Rash   • Pollen Extract Allergic Rhinitis and Other (See Comments)   • Sulfa Antibiotics Rash   • Wound Dressings Rash     BAND AIDS RASH AND BLISTERS     Vitals:    12/07/22 1412   BP: (!) 178/82   Pulse: 79   Temp: 98 7 °F (37 1 °C)   SpO2: 99%       Physical Exam  Vitals reviewed  Constitutional:       Appearance: Normal appearance  HENT:      Head: Normocephalic and atraumatic  Right Ear: External ear normal       Left Ear: External ear normal    Eyes:      Extraocular Movements: Extraocular movements intact  Pupils: Pupils are equal, round, and reactive to light  Cardiovascular:      Rate and Rhythm: Normal rate and regular rhythm  Heart sounds: Normal heart sounds  Pulmonary:      Effort: Pulmonary effort is normal       Breath sounds: Normal breath sounds  Abdominal:      General: Abdomen is flat  Palpations: Abdomen is soft  Musculoskeletal:         General: Normal range of motion  Cervical back: Normal range of motion and neck supple  No rigidity or tenderness  Lymphadenopathy:      Cervical: No cervical adenopathy  Skin:     General: Skin is warm and dry  Neurological:      General: No focal deficit present  Mental Status: She is alert and oriented to person, place, and time     Psychiatric:         Mood and Affect: Mood normal  Behavior: Behavior normal            Results:  Labs:  none    Imaging  US thyroid    Result Date: 12/6/2022  Narrative: THYROID ULTRASOUND INDICATION:    E04 1: Nontoxic single thyroid nodule  COMPARISON:  Multiple prior examinations most recently December 3, 2021 TECHNIQUE:   Ultrasound of the thyroid was performed with a high frequency linear transducer in transverse and sagittal planes including volumetric imaging sweeps as well as traditional still imaging technique  FINDINGS: Right lobe: 3 3 x 1 6 x 1 0 cm  Volume 2 6 mL Left lobe:  5 2 x 2 5 x 2 1 cm  Volume 13 7 mL Isthmus: 0 4  cm  Markedly heterogeneous thyroid parenchyma, unchanged from prior examinations  Stable nodular enlargement of the left isthmus and adjacent left lobe  This was previously biopsied in 2011 with benign results  No new discrete nodules visualized  Impression: Unchanged appearance of markedly heterogeneous thyroid parenchyma  No new, enlarging, or otherwise suspicious thyroid nodules visualized  Reference: ACR Thyroid Imaging, Reporting and Data System (TI-RADS): White Paper of [employer]  J AM Eliel Radiol 2047;13:929-943  (additional recommendations based on American Thyroid Association 2015 guidelines ) Workstation performed: [id number]     I reviewed the above laboratory and imaging data  Discussion/Summary: 78-year-old woman here for surveillance of thyroid nodules  He has been stable for the last 11 years  We will therefore follow-up on a as needed basis  All questions answered and copy of report given to patient for her records

## 2022-12-07 NOTE — PROGRESS NOTES
Surgical Oncology Follow Up       [address] [facility] [address] [address]    [facility]  1940  [id number]  [facility] [place]  Thiago Fuller [address]    Chief Complaint   Patient presents with   • Follow-up     Pt presents for a f/u on Nontoxic single thyroid nodule  Pt is questioning her neck size  Pt reports left side looks larger then right       Assessment/Plan:    No problem-specific Assessment & Plan notes found for this encounter  Diagnoses and all orders for this visit:    Nontoxic single thyroid nodule        Advance Care Planning/Advance Directives:  Discussed disease status, cancer treatment plans and/or cancer treatment goals with the patient  Oncology History   History of cervical cancer    Initial Diagnosis    Cervical cancer ([facility] [address] )     1/11/2011 Surgery    ALISTAIR-BSO, pelvic and paraaortic lymph node dissection for presumed endometrial cancer  A  3 2 cm SCC, outer third invasion, microscopically positive posterior margin, 32 negative lymph nodes, negative washings      Chemotherapy    Cisplatin with radiation      - 4/11/2011 Radiation    Whole pelvic radiotherapy      Adverse Reaction    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     3/6/2014 Surgery    Right upper lobe resection x 2  A  Recurrent SCC     9/1/2014 Surgery    Thoracotomy and resection of left pulmonary lesions      - 6/2018 Radiation    50Gy to left lung recurrence     Diffuse large B cell lymphoma ([address] )    Initial Diagnosis    Diffuse large B cell lymphoma ([facility] [address] )      - 12/6/2012 Chemotherapy    R-CHOP         History of Present Illness: 39-year-old woman with history of thyroid nodules which are the following the last several years  Here for surveillance visit no new thyroid complaints to report    -Interval History: She has ultrasound done recently in anticipation of today's visit  Review of Systems:  Review of Systems   Constitutional: Negative  HENT: Negative  Eyes: Negative  Respiratory: Negative  Cardiovascular: Negative  Gastrointestinal: Negative  Endocrine: Negative  Genitourinary: Negative  Musculoskeletal: Negative  Skin: Negative  Allergic/Immunologic: Negative  Neurological: Negative  Hematological: Negative  Psychiatric/Behavioral: Negative          Patient Active Problem List   Diagnosis   • History of DVT (deep vein thrombosis)   • History of cervical cancer   • Diffuse large B cell lymphoma (HCC)   • Nontoxic single thyroid nodule   • Abdominal pain of multiple sites   • Chemotherapy follow-up examination   • Compression fracture of third lumbar vertebra with routine healing   • Depression   • Edema of left lower extremity   • Benign essential hypertension   • Hypertension   • Hypothyroidism   • Lymphedema   • Nodule of left lung   • Other diseases of lung, not elsewhere classified   • Renal insufficiency   • Encounter for follow-up surveillance of cervical cancer   • Metastasis from cervical cancer [facility])   • Inflammatory bowel disease   • Radiation enteritis     Past Medical History:   Diagnosis Date   • Cervical cancer ([facility] [address] ) 01/16/2011   • History of chemotherapy     age 65   • History of DVT (deep vein thrombosis)    • History of radiation therapy     age 65     Past Surgical History:   Procedure Laterality Date   • BREAST EXCISIONAL BIOPSY Left 1985    benign   • HYSTERECTOMY      age69   • OOPHORECTOMY Bilateral     age 65     Family History   Problem Relation Age of Onset   • No Known Problems Mother    • No Known Problems Father    • No Known Problems Daughter    • No Known Problems Maternal Grandmother    • No Known Problems Maternal Grandfather    • No Known Problems Paternal Grandmother    • No Known Problems Paternal Grandfather      Social History     Socioeconomic History   • Marital status: /Civil Union     Spouse name: Not on file   • Number of children: Not on file   • Years of education: Not on file   • Highest education level: Not on file   Occupational History   • Not on file   Tobacco Use   • Smoking status: Never   • Smokeless tobacco: Never   Vaping Use   • Vaping Use: Not on file   Substance and Sexual Activity   • Alcohol use: No   • Drug use: No   • Sexual activity: Not on file   Other Topics Concern   • Not on file   Social History Narrative   • Not on file     Social Determinants of Health     Financial Resource Strain: Not on file   Food Insecurity: Not on file   Transportation Needs: Not on file   Physical Activity: Not on file   Stress: Not on file   Social Connections: Not on file   Intimate Partner Violence: Not on file   Housing Stability: Not on file       Current Outpatient Medications:   •  acetaminophen (TYLENOL) 650 mg CR tablet, Take 650 mg by mouth every 8 (eight) hours as needed  , Disp: , Rfl:   •  ascorbic acid (VITAMIN C) 1000 MG tablet, Take 1,000 mg by mouth, Disp: , Rfl:   •  aspirin (ECOTRIN LOW STRENGTH) 81 mg EC tablet, Take by mouth, Disp: , Rfl:   •  Calcium Polycarbophil (Fiber) 625 MG TABS, Take by mouth daily, Disp: , Rfl:   •  CALCIUM-MAGNESIUM-VITAMIN D PO, Take by mouth, Disp: , Rfl:   •  Cholecalciferol (VITAMIN D3) 2000 units capsule, Take 1 tablet by mouth continuous as needed  , Disp: , Rfl:   •  cycloSPORINE (Restasis) 0 05 % ophthalmic emulsion, Administer 1 drop to both eyes every 12 (twelve) hours  , Disp: , Rfl:   •  DAILY MULTIPLE VITAMINS PO, Take 1 tablet by mouth daily, Disp: , Rfl:   •  diazepam (VALIUM) 5 mg tablet, Take 2 5 mg by mouth every 6 (six) hours as needed for anxiety, Disp: , Rfl:   •  dicyclomine (BENTYL) 10 mg capsule, Take 1 capsule by mouth, Disp: , Rfl:   •  docusate sodium (COLACE) 50 mg capsule, Take by mouth 2 (two) times a day  , Disp: , Rfl:   •  Docusate Sodium (STOOL SOFTENER LAXATIVE PO), Take by mouth 50mg, Disp: , Rfl:   •  famotidine (PEPCID) 20 mg tablet, Take 20 mg by mouth daily  , Disp: , Rfl:   •  fluticasone (FLONASE) 50 mcg/act nasal spray, 2 sprays into each nostril as needed, Disp: , Rfl:   •  Garlic 827 MG TABS, Take by mouth, Disp: , Rfl:   •  Glutathione POWD, Take by mouth daily  , Disp: , Rfl:   •  guaiFENesin (MUCINEX) 600 mg 12 hr tablet, Take 1,200 mg by mouth, Disp: , Rfl:   •  lisinopril (ZESTRIL) 2 5 mg tablet, Take 2 5 mg by mouth daily, Disp: , Rfl: 3  •  METRONIDAZOLE, TOPICAL, 0 75 % LOTN, metronidazole 0 75 % lotion  APPLY 1 2 TIMES DAILY TO FACE 90 DAY SUPPLY, Disp: , Rfl:   •  Omega-3 Fatty Acids (FISH OIL) 1,000 mg, Take 1,000 mg by mouth daily, Disp: , Rfl:   •  omeprazole (PriLOSEC) 20 mg delayed release capsule, Take 20 mg by mouth, Disp: , Rfl:   •  Probiotic Product (PROBIOTIC-10) CAPS, Take by mouth daily, Disp: , Rfl:   •  sodium chloride (OCEAN) 0 65 % nasal spray, 1 spray into each nostril daily, Disp: , Rfl:   •  Specialty Vitamins Products (ONE-A-DAY BONE STRENGTH PO), Take 3 capsules by mouth daily  , Disp: , Rfl:   •  SYNTHROID 50 MCG tablet, Take 50 mcg by mouth daily  , Disp: , Rfl:   •  TURMERIC CURCUMIN PO, Take by mouth, Disp: , Rfl:   •  Ubiquinol 100 MG CAPS, Take 1 capsule by mouth daily  , Disp: , Rfl:   Allergies   Allergen Reactions   • Iodinated Diagnostic Agents Diarrhea     Severe! Severe   • Iron Sucrose Other (See Comments), Anaphylaxis and Shortness Of Breath     Specifically venafor brand; labored breathing, chest discomfort, weakness, facial flush  Therahem is ok  Specifically Venofer brand; labored breathing, chest discomfort, weakness, facial flush  Therahem is ok   • Bee Pollen Allergic Rhinitis   • Medical Tape Itching   • Misc   Sulfonamide Containing Compounds Hives   • Other Other (See Comments) and Itching     BAND AIDES RASH  Blisters   • Wound Dressing Adhesive Other (See Comments)     Blisters   • Oxybutynin Rash   • Pollen Extract Allergic Rhinitis and Other (See Comments)   • Sulfa Antibiotics Rash   • Wound Dressings Rash     BAND AIDS RASH AND BLISTERS     Vitals:    12/07/22 1412   BP: (!) 178/82   Pulse: 79   Temp: 98 7 °F (37 1 °C)   SpO2: 99%       Physical Exam  Vitals reviewed  Constitutional:       Appearance: Normal appearance  HENT:      Head: Normocephalic and atraumatic  Right Ear: External ear normal       Left Ear: External ear normal    Eyes:      Extraocular Movements: Extraocular movements intact  Pupils: Pupils are equal, round, and reactive to light  Cardiovascular:      Rate and Rhythm: Normal rate and regular rhythm  Heart sounds: Normal heart sounds  Pulmonary:      Effort: Pulmonary effort is normal       Breath sounds: Normal breath sounds  Abdominal:      General: Abdomen is flat  Palpations: Abdomen is soft  Musculoskeletal:         General: Normal range of motion  Cervical back: Normal range of motion and neck supple  No rigidity or tenderness  Lymphadenopathy:      Cervical: No cervical adenopathy  Skin:     General: Skin is warm and dry  Neurological:      General: No focal deficit present  Mental Status: She is alert and oriented to person, place, and time  Psychiatric:         Mood and Affect: Mood normal          Behavior: Behavior normal            Results:  Labs:  none    Imaging  US thyroid    Result Date: 12/6/2022  Narrative: THYROID ULTRASOUND INDICATION:    E04 1: Nontoxic single thyroid nodule  COMPARISON:  Multiple prior examinations most recently December 3, 2021 TECHNIQUE:   Ultrasound of the thyroid was performed with a high frequency linear transducer in transverse and sagittal planes including volumetric imaging sweeps as well as traditional still imaging technique  FINDINGS: Right lobe: 3 3 x 1 6 x 1 0 cm  Volume 2 6 mL Left lobe:  5 2 x 2 5 x 2 1 cm  Volume 13 7 mL Isthmus: 0 4  cm   Markedly heterogeneous thyroid parenchyma, unchanged from prior examinations  Stable nodular enlargement of the left isthmus and adjacent left lobe  This was previously biopsied in 2011 with benign results  No new discrete nodules visualized  Impression: Unchanged appearance of markedly heterogeneous thyroid parenchyma  No new, enlarging, or otherwise suspicious thyroid nodules visualized  Reference: ACR Thyroid Imaging, Reporting and Data System (TI-RADS): White Paper of the [employer]  J AM Eliel Radiol 6458;74:137-162  (additional recommendations based on American Thyroid Association 2015 guidelines ) Workstation performed: [id number]     I reviewed the above laboratory and imaging data  Discussion/Summary: 68-year-old woman here for surveillance of thyroid nodules  He has been stable for the last 11 years  We will therefore follow-up on a as needed basis  All questions answered and copy of report given to patient for her records

## 2022-12-08 DIAGNOSIS — K59.00 CONSTIPATION, UNSPECIFIED CONSTIPATION TYPE: Primary | ICD-10-CM

## 2022-12-13 ENCOUNTER — TELEPHONE (OUTPATIENT)
Dept: OTHER | Facility: OTHER | Age: 82
End: 2022-12-13

## 2022-12-13 NOTE — TELEPHONE ENCOUNTER
Patient called concerned about a recent finding on her private area  Pt is flying out to [facility] tomorrow morning and would like to speak to Dr Debo Gerber before she leaves  Patient states is for her own peace of mind    Patient would like a callback from the office tomorrow morning, before 10 am  She will be leaving to the airport at 11am

## 2022-12-14 NOTE — TELEPHONE ENCOUNTER
Pt called in again and said if you could call her between 9:00am -but before 1030am because of her flight

## 2022-12-14 NOTE — TELEPHONE ENCOUNTER
Call placed to patient  She notes a sore on her labia that is improved this morning   She will plan to be evaluated at urgent care when she lands in [place]

## 2023-01-11 ENCOUNTER — NURSE TRIAGE (OUTPATIENT)
Dept: OTHER | Facility: OTHER | Age: 83
End: 2023-01-11

## 2023-01-11 DIAGNOSIS — R11.0 CHRONIC NAUSEA: Primary | ICD-10-CM

## 2023-01-11 NOTE — TELEPHONE ENCOUNTER
Regarding: Gastro Attack/Nausea/Cramping  ----- Message from Mana Ibarra sent at 1/11/2023 12:53 PM EST -----  "I am currently having a gastro attack   I am having a lot of cramping and nausea and I was just looking for some advice on what to do "

## 2023-01-11 NOTE — TELEPHONE ENCOUNTER
Spoke with patient  In addition to triage  Cramping/burning 4/10 pain around ostomy started last night associated with nausea  Ostomy stool this am- formed consistency  Denies fever       Recommended   Bentyl as needed for cramping  Clear liquids diet

## 2023-01-11 NOTE — TELEPHONE ENCOUNTER
Patient would like to speak with Yajaira in the office  She would like advise and guidance on how to manage her symptoms  She decline the emergency room at this time  Would like to speak with someone in the office  Please call and advise  Reason for Disposition  • MODERATE pain (e g , interferes with normal activities that comes and goes (cramps) lasts > 24 hours (Exception: pain with Vomiting or Diarrhea - see that Protocol)    Answer Assessment - Initial Assessment Questions  1  LOCATION: "Where does it hurt?"       Cramping around ostomy pouch  My whole stomach hurts  2  RADIATION: "Does the pain shoot anywhere else?" (e g , chest, back)  Denies   3  ONSET: "When did the pain begin?" (e g , minutes, hours or days ago)      Last night  4  SUDDEN: "Gradual or sudden onset?"     "out of the blue but then it builds"   5  PATTERN "Does the pain come and go, or is it constant?"   cramping   6  SEVERITY: "How bad is the pain?"  (e g , Scale 1-10; mild, moderate, or severe) 4/10    7  RECURRENT SYMPTOM: "Have you ever had this type of stomach pain before?" If Yes, ask: "When was the last time?" and "What happened that time?"     Similar but not like this  8   CAUSE: "What do you think is causing the stomach pain?"  "My stool was soft but in a state of pasty with a little shape to it"    Protocols used: ABDOMINAL PAIN - [facility] - MARIAN GOMEZ

## 2023-01-12 ENCOUNTER — TELEPHONE (OUTPATIENT)
Dept: GYNECOLOGIC ONCOLOGY | Facility: CLINIC | Age: 83
End: 2023-01-12

## 2023-01-12 DIAGNOSIS — I89.0 LYMPHEDEMA: Primary | ICD-10-CM

## 2023-01-12 DIAGNOSIS — I89.0 LYMPHEDEMA OF BOTH LOWER EXTREMITIES: ICD-10-CM

## 2023-01-12 RX ORDER — ONDANSETRON 4 MG/1
4 TABLET, FILM COATED ORAL EVERY 8 HOURS PRN
Qty: 20 TABLET | Refills: 0 | Status: SHIPPED | OUTPATIENT
Start: 2023-01-12 | End: 2023-01-17 | Stop reason: SDUPTHER

## 2023-01-12 NOTE — TELEPHONE ENCOUNTER
Patient called Into the office following up on a script that she requested last week for a Lymphedema treatment/ therapy  Patient is [place] a [place] [facility] [place] as follows [phone]

## 2023-01-12 NOTE — TELEPHONE ENCOUNTER
Called and spoke with patient  Relayed Zofran was sent to [facility]  Pt states pain has decreased and will slowly advance clear liquids to low residue diet later today  Patient will call office if symptoms persist or worsen

## 2023-01-17 ENCOUNTER — OFFICE VISIT (OUTPATIENT)
Dept: GASTROENTEROLOGY | Facility: CLINIC | Age: 83
End: 2023-01-17

## 2023-01-17 VITALS
BODY MASS INDEX: 25.29 KG/M2 | HEART RATE: 68 BPM | HEIGHT: 60 IN | SYSTOLIC BLOOD PRESSURE: 178 MMHG | DIASTOLIC BLOOD PRESSURE: 83 MMHG | OXYGEN SATURATION: 96 % | WEIGHT: 128.8 LBS | TEMPERATURE: 97.9 F

## 2023-01-17 DIAGNOSIS — R10.30 LOWER ABDOMINAL PAIN: Primary | ICD-10-CM

## 2023-01-17 DIAGNOSIS — R11.0 CHRONIC NAUSEA: ICD-10-CM

## 2023-01-17 DIAGNOSIS — K56.51 INTESTINAL ADHESIONS WITH PARTIAL OBSTRUCTION (HCC): ICD-10-CM

## 2023-01-17 DIAGNOSIS — R11.2 NAUSEA AND VOMITING, UNSPECIFIED VOMITING TYPE: ICD-10-CM

## 2023-01-17 RX ORDER — ONDANSETRON 4 MG/1
4 TABLET, FILM COATED ORAL EVERY 8 HOURS PRN
Qty: 20 TABLET | Refills: 0 | Status: SHIPPED | OUTPATIENT
Start: 2023-01-17

## 2023-01-17 NOTE — PROGRESS NOTES
[facility] - Outpatient Follow-up Note  Joelle Wu 78 y o  female MRN: [id number]  Encounter: [id number]          ASSESSMENT AND PLAN:    Joelle Wu is a 78 y o  female who presents with complicated oncologic and surgical history  In my opinion she is experiencing episodes of partial obstruction due to adhesive disease from her many surgeries and radiation  She is very clear that she wishes to avoid further surgeries and chemotherapy and I agree with her completely  A lot of our conversation today focused on the utility of ordering further studies  While I think that a CT enterography could be informative in terms of delineating her anatomy, it would likely not change our treatment approach in the immediate future  I favor a conservative treatment approach with low fiber diet and, if necessary, fasting if she becomes obstructed  We discussed that if she develops diminishing stool output, abdominal pain, distention, vomiting, nausea, she should likely go to the emergency room and get imaged  From my standpoint, it may be best to defer imaging at this time as it won't necessarily change what we do  Moreover CT enterography would expose her to IV contrast and risk kidney damage  Ms Jabari Sanchez will be seeing her oncologist tomorrow and will be discussing whether she needs further CAT scans to monitor her cancer  If it is determined that a CAT scan is necessary for cancer staging, then it would be helpful to make it a CT enterography so that it can simultaneously address that issues discussed above  CT enterography should be done with p o  and IV contrast and she should drink plenty of fluids before and after the study to prevent kidney damage  I will see her back in 3 months  1  Lower abdominal pain    2  Chronic nausea    3  Nausea and vomiting, unspecified vomiting type    4   Intestinal adhesions with partial obstruction (HCC)        No orders of the defined types were placed in this encounter     ______________________________________________________________________    SUBJECTIVE:    Meka Damon is a 78 y o  female with complicated oncologic and surgical history who presents for follow-up episodes of abdominal pain and decreased colostomy output suggestive of partial obstructions  She is in the office with her son  She will be meeting with her oncologist tomorrow  As documented in my previous note from October, she has history of recurrent metastatic cervical cancer treated with multiple rounds of chemotherapy and radiation, most recently in 2018, as well as lung wedge resections  She also has history of lymphoma treated with radiation  She has history of colonic obstruction and is s/p partial colectomy in 2012 with LLQ colostomy  She also has history of 3 cm colon adenoma and underwent 5 colonoscopies in 2021  When I saw her in October, our plan was to schedule a CT enterography study to evaluate for adhesive disease, luminal narrowing, and partial obstruction  The study has not yet happened  She reports 1 episode of abdominal pain and obstructive symptoms which started on January 11  She developed burning lower abdominal pain and had decreased colostomy output and her stool became less formed  She felt nauseous and vomited twice and felt unwell all day  She had minimal oral intake during the day, then slowly advance her diet over the next several days  Her colostomy output slowly returned to normal   She now feels back to normal   She takes Colace 5250 mg/day and her stools are now formed and soft  She also eats small meals  She avoids high-fiber foods  She will be seeing her oncologist tomorrow  They will be discussing possible repeat imaging to monitor her cancer  She has history of iron deficiency and has required IV infusions in the past   Her CBC and iron studies were normal in October    She has history of bleeding AVMs in 2020   She has history of lymphedema and lower extremity DVT but is not currently anticoagulated  She has history of hydronephrosis and has ureteral stent  She is adamant that she does not want further chemotherapy and wants to avoid surgery at all costs  REVIEW OF SYSTEMS IS OTHERWISE NEGATIVE        Historical Information   Past Medical History:   Diagnosis Date   • Cervical cancer ([facility] [address] ) 01/16/2011   • History of chemotherapy     age 65   • History of DVT (deep vein thrombosis)    • History of radiation therapy     age 65     Past Surgical History:   Procedure Laterality Date   • BREAST EXCISIONAL BIOPSY Left 1985    benign   • HYSTERECTOMY      age71   • OOPHORECTOMY Bilateral     age 65     Social History   Social History     Substance and Sexual Activity   Alcohol Use No     Social History     Substance and Sexual Activity   Drug Use No     Social History     Tobacco Use   Smoking Status Never   Smokeless Tobacco Never     Family History   Problem Relation Age of Onset   • No Known Problems Mother    • No Known Problems Father    • No Known Problems Daughter    • No Known Problems Maternal Grandmother    • No Known Problems Maternal Grandfather    • No Known Problems Paternal Grandmother    • No Known Problems Paternal Grandfather        Meds/Allergies       Current Outpatient Medications:   •  acetaminophen (TYLENOL) 650 mg CR tablet  •  ascorbic acid (VITAMIN C) 1000 MG tablet  •  aspirin (ECOTRIN LOW STRENGTH) 81 mg EC tablet  •  Calcium Polycarbophil (Fiber) 625 MG TABS  •  CALCIUM-MAGNESIUM-VITAMIN D PO  •  Cholecalciferol (VITAMIN D3) 2000 units capsule  •  cycloSPORINE (Restasis) 0 05 % ophthalmic emulsion  •  DAILY MULTIPLE VITAMINS PO  •  diazepam (VALIUM) 5 mg tablet  •  docusate sodium (COLACE) 50 mg capsule  •  famotidine (PEPCID) 20 mg tablet  •  fluticasone (FLONASE) 50 mcg/act nasal spray  •  Garlic 160 MG TABS  •  Glutathione POWD  •  guaiFENesin (MUCINEX) 600 mg 12 hr tablet  • lisinopril (ZESTRIL) 2 5 mg tablet  •  METRONIDAZOLE, TOPICAL, 0 75 % LOTN  •  Omega-3 Fatty Acids (FISH OIL) 1,000 mg  •  omeprazole (PriLOSEC) 20 mg delayed release capsule  •  ondansetron (ZOFRAN) 4 mg tablet  •  Probiotic Product (PROBIOTIC-10) CAPS  •  sodium chloride (OCEAN) 0 65 % nasal spray  •  Specialty Vitamins Products (ONE-A-DAY BONE STRENGTH PO)  •  SYNTHROID 50 MCG tablet  •  TURMERIC CURCUMIN PO  •  Ubiquinol 100 MG CAPS    Allergies   Allergen Reactions   • Iodinated Contrast Media Diarrhea     Severe! Severe   • Iron Sucrose Other (See Comments), Anaphylaxis and Shortness Of Breath     Specifically venafor brand; labored breathing, chest discomfort, weakness, facial flush  Therahem is ok  Specifically Venofer brand; labored breathing, chest discomfort, weakness, facial flush  Therahem is ok   • Bee Pollen Allergic Rhinitis   • Medical Tape Itching   • Misc  Sulfonamide Containing Compounds Hives   • Other Other (See Comments) and Itching     BAND AIDES RASH  Blisters   • Wound Dressing Adhesive Other (See Comments)     Blisters   • Oxybutynin Rash   • Pollen Extract Allergic Rhinitis and Other (See Comments)   • Sulfa Antibiotics Rash   • Wound Dressings Rash     BAND AIDS RASH AND BLISTERS           Objective     Blood pressure (!) 178/83, pulse 68, temperature 97 9 °F (36 6 °C), temperature source Tympanic, height 5' (1 524 m), weight 58 4 kg (128 lb 12 8 oz), SpO2 96 %  Body mass index is 25 15 kg/m²  PHYSICAL EXAM:      General Appearance:   Alert, cooperative, no distress   HEENT:   Normocephalic, atraumatic, anicteric  Neck:  Supple, symmetrical, trachea midline   Lungs:   Clear to auscultation bilaterally; no rales, rhonchi or wheezing; respirations unlabored    Heart[de-identified]   Regular rate and rhythm; no murmur, rub, or gallop  Abdomen:   Soft, non-tender, non-distended; normal bowel sounds; no masses, no organomegaly; colostomy in left lower quadrant     Genitalia:   Deferred Rectal:   Deferred    Extremities:  No cyanosis, clubbing or edema    Pulses:  2+ and symmetric    Skin:  No jaundice, rashes, or lesions    Lymph nodes:  No palpable cervical lymphadenopathy        Lab Results:   No visits with results within 1 Day(s) from this visit  Latest known visit with results is:   No results found for any previous visit  No results found for: WBC, HGB, HCT, MCV, PLT    No results found for: NA, SODIUM, K, CL, CO2, ANIONGAP, AGAP, BUN, CREATININE, GLUC, GLUF, CALCIUM, AST, ALT, ALKPHOS, PROT, TP, BILITOT, TBILI, EGFR    No results found for: CRP    No results found for: [id number], TSH    No results found for: IRON, TIBC, FERRITIN    Radiology Results:   No results found

## 2023-02-14 ENCOUNTER — TELEPHONE (OUTPATIENT)
Dept: HEMATOLOGY ONCOLOGY | Facility: CLINIC | Age: 83
End: 2023-02-14

## 2023-02-14 NOTE — TELEPHONE ENCOUNTER
Pt called and wanted to move her 9/23 appointment to Feb 23 due to patient having other health issues cramping & vomitting-pt saw other dr's and Cat scan to be done but would like to see Dr Candi Barajas sooner in [place] instead of [facility] per pt [phone] (12-3 pm today if possible  She will not be home after 3 pm today

## 2023-02-21 ENCOUNTER — TELEPHONE (OUTPATIENT)
Dept: SURGICAL ONCOLOGY | Facility: CLINIC | Age: 83
End: 2023-02-21

## 2023-02-21 ENCOUNTER — TELEPHONE (OUTPATIENT)
Dept: GYNECOLOGIC ONCOLOGY | Facility: CLINIC | Age: 83
End: 2023-02-21

## 2023-02-21 DIAGNOSIS — R22.42 LOCALIZED SWELLING OF LEFT LOWER EXTREMITY: Primary | ICD-10-CM

## 2023-02-21 NOTE — TELEPHONE ENCOUNTER
Ramya Espinoza from [facility] called in for the order to be faxed over along with information regarding the scan   Faxed over to the same number as below, There are no preventive care reminders to display for this patient.    Patient is up to date, no discussion needed.

## 2023-02-21 NOTE — TELEPHONE ENCOUNTER
Return call to Ronald Most her reports persistent swelling to LLE in spite of using compression garments to that leg,  she reports this is unusual   She voiced concern for a clot to her LLE  Will order doppler studies to r/o clot to LLE-patient will schedule on her own  Number to central scheduling provided

## 2023-02-21 NOTE — TELEPHONE ENCOUNTER
Patient called into the office with a concern about having a possible blood clot in her leg  Patient states that during the night she wears a compression that helps with her lymphedema, and that with the compression her leg usually becomes puffy but the puffiness starts to wear down when she walks  Patient states that this time around that it is different and that the puffiness in her leg will not go down   Patient is concerned and is requesting a phone call back @ [phone]

## 2023-02-28 ENCOUNTER — OFFICE VISIT (OUTPATIENT)
Dept: GYNECOLOGIC ONCOLOGY | Facility: CLINIC | Age: 83
End: 2023-02-28

## 2023-02-28 VITALS
SYSTOLIC BLOOD PRESSURE: 142 MMHG | OXYGEN SATURATION: 98 % | HEART RATE: 78 BPM | WEIGHT: 130 LBS | DIASTOLIC BLOOD PRESSURE: 68 MMHG | BODY MASS INDEX: 25.52 KG/M2 | HEIGHT: 60 IN

## 2023-02-28 DIAGNOSIS — Z12.31 ENCOUNTER FOR SCREENING MAMMOGRAM FOR BREAST CANCER: ICD-10-CM

## 2023-02-28 DIAGNOSIS — Z85.41 HISTORY OF CERVICAL CANCER: ICD-10-CM

## 2023-02-28 DIAGNOSIS — R31.0 GROSS HEMATURIA: Primary | ICD-10-CM

## 2023-03-01 NOTE — PROGRESS NOTES
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  She has bilateral lower extremity lymphedema from prior surgery and radiation  She has chronic intermittent enteritis/bowel dysfunction  She has new onset vaginal bleeding versus gross hematuria  Pelvic examination demonstrated radiation change without evidence of visible recurrence  Her performance status is 1   1   Assess urinalysis with culture given possible gross hematuria as there was no clear vaginal source  It is possible that she had vaginal bleeding from atrophic vaginitis that resolved  2   She has seen an IBD specialist for her chronic enteritis and a CT enterography has been ordered  3   Repeat CT of chest at the same time as her CT enterography to evaluate for disease recurrence  4   Screening mammography  5  Return in 6 months pending symptoms, imaging results  6   If she has recurrent disease, she is interested in discussing treatment           Encounter for screening mammogram for breast cancer    Relevant Orders    Mammo screening bilateral w 3d & cad         CHIEF COMPLAINT: Blood with a clot in the toilet bowl, unclear source      Problem:  Cancer Staging   History of cervical cancer  Staging form: Cervix Uteri, AJCC 8th Edition  - Clinical: FIGO Stage IB1 (cT1b1, cM0) - Signed by Michel Cuadra PA-C on 4/25/2018        Previous therapy:  Oncology History   History of cervical cancer    Initial Diagnosis    Cervical cancer ([facility] [address] )     1/11/2011 Surgery    ALISTAIR-BSO, pelvic and paraaortic lymph node dissection for presumed endometrial cancer  A  3 2 cm SCC, outer third invasion, microscopically positive posterior margin, 32 negative lymph nodes, negative washings      Chemotherapy    Cisplatin with radiation      - 4/11/2011 Radiation    Whole pelvic radiotherapy      Adverse Reaction    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     3/6/2014 Surgery    Right upper lobe resection x 2  A  Recurrent SCC     9/1/2014 Surgery    Thoracotomy and resection of left pulmonary lesions      - 6/2018 Radiation    50Gy to left lung recurrence     Diffuse large B cell lymphoma ([facility] [address] )    Initial Diagnosis    Diffuse large B cell lymphoma ([facility] [address] )      - 12/6/2012 Chemotherapy    R-CHOP           Patient ID: Tamanna Argueta is a 78 y o  female  Who returns for evaluation  She recently noticed blood in the toilet bowl while going to the bathroom  It was unclear whether the blood came from the vagina, rectum, or bladder  She had bleeding previously due to her stents  Goes for regular stent exchanges approximately every 6 months  Serum creatinine has improved to 0 77 as of January 2023  She has continued to struggle with intermittent vomiting, abdominal pain  She saw an IBD specialist who is concerned that she may be having intermittent episodes of partial bowel obstruction  CT colonography has been scheduled  Her last imaging to evaluate for recurrent cervical cancer was approximately 1 year ago  She continues to perform her activities of daily living  She is getting ongoing treatment for bilateral lower extremity lymphedema and recently had a bilateral lower extremity Doppler ultrasound on 2/21/2023 that was negative for DVT        The following portions of the patient's history were reviewed and updated as appropriate: allergies, current medications, past family history, past medical history, past social history, past surgical history and problem list     Review of Systems   Constitutional: Negative for activity change and unexpected weight change  HENT: Negative  Eyes: Negative  Respiratory: Negative  Cardiovascular: Positive for leg swelling  Gastrointestinal: Positive for abdominal distention, abdominal pain, nausea and vomiting  Endocrine: Negative  Genitourinary: Positive for vaginal bleeding  Negative for pelvic pain  Musculoskeletal: Negative  Skin: Negative  Allergic/Immunologic: Negative  Neurological: Negative  Hematological: Negative  Psychiatric/Behavioral: Negative          Current Outpatient Medications   Medication Sig Dispense Refill   • acetaminophen (TYLENOL) 650 mg CR tablet Take 650 mg by mouth every 8 (eight) hours as needed       • ascorbic acid (VITAMIN C) 1000 MG tablet Take 1,000 mg by mouth     • aspirin (ECOTRIN LOW STRENGTH) 81 mg EC tablet Take by mouth     • Calcium Polycarbophil (Fiber) 625 MG TABS Take by mouth daily     • CALCIUM-MAGNESIUM-VITAMIN D PO Take by mouth     • Cholecalciferol (VITAMIN D3) 2000 units capsule Take 1 tablet by mouth continuous as needed       • cycloSPORINE (Restasis) 0 05 % ophthalmic emulsion Administer 1 drop to both eyes every 12 (twelve) hours       • DAILY MULTIPLE VITAMINS PO Take 1 tablet by mouth daily     • diazepam (VALIUM) 5 mg tablet Take 2 5 mg by mouth every 6 (six) hours as needed for anxiety     • docusate sodium (COLACE) 50 mg capsule Take 1 capsule (50 mg total) by mouth 2 (two) times a day as needed for constipation 90 capsule 3   • famotidine (PEPCID) 20 mg tablet Take 20 mg by mouth daily       • fluticasone (FLONASE) 50 mcg/act nasal spray 2 sprays into each nostril as needed     • Garlic 804 MG TABS Take by mouth     • Glutathione POWD Take by mouth daily       • guaiFENesin (MUCINEX) 600 mg 12 hr tablet Take 1,200 mg by mouth     • lisinopril (ZESTRIL) 2 5 mg tablet Take 2 5 mg by mouth daily  3   • METRONIDAZOLE, TOPICAL, 0 75 % LOTN metronidazole 0 75 % lotion   APPLY 1 2 TIMES DAILY TO FACE 90 DAY SUPPLY     • Omega-3 Fatty Acids (FISH OIL) 1,000 mg Take 1,000 mg by mouth daily     • omeprazole (PriLOSEC) 20 mg delayed release capsule Take 20 mg by mouth     • ondansetron (ZOFRAN) 4 mg tablet Take 1 tablet (4 mg total) by mouth every 8 (eight) hours as needed for nausea or vomiting 20 tablet 0   • Probiotic Product (PROBIOTIC-10) CAPS Take by mouth daily     • sodium chloride (OCEAN) 0 65 % nasal spray 1 spray into each nostril daily     • Specialty Vitamins Products (ONE-A-DAY BONE STRENGTH PO) Take 3 capsules by mouth daily       • SYNTHROID 50 MCG tablet Take 50 mcg by mouth daily       • TURMERIC CURCUMIN PO Take by mouth     • Ubiquinol 100 MG CAPS Take 1 capsule by mouth daily         No current facility-administered medications for this visit  Objective:    Blood pressure 142/68, pulse 78, height 5' (1 524 m), weight 59 kg (130 lb), SpO2 98 %  Body mass index is 25 39 kg/m²  Body surface area is 1 55 meters squared  Physical Exam  Vitals reviewed  Exam conducted with a chaperone present  Constitutional:       General: She is not in acute distress  Appearance: Normal appearance  She is well-developed and normal weight  She is not ill-appearing, toxic-appearing or diaphoretic  HENT:      Head: Normocephalic and atraumatic  Eyes:      General: No scleral icterus  Extraocular Movements: Extraocular movements intact  Conjunctiva/sclera: Conjunctivae normal    Neck:      Thyroid: No thyromegaly  Pulmonary:      Effort: Pulmonary effort is normal    Abdominal:      General: There is no distension  Palpations: Abdomen is soft  There is no mass  Tenderness: There is no abdominal tenderness  There is no guarding or rebound  Hernia: No hernia is present  Genitourinary:     Comments: The external female genitalia is atrophic  The introitus is stenotic  There are no visible evidence of masses or lesions    Speculum examination revealed stenotic, atrophic vagina with extensive radiation change  There were no visible masses or lesions  Vaginal length is approximately 12 cm  Bimanual examination reveals extensive thickening of the pelvic floor consistent with prior radiation therapy  Rectal examination did not reveal any evidence of rectal mucosal mass  No obvious source was identified for her bleeding  Musculoskeletal:         General: No swelling or tenderness  Cervical back: Normal range of motion and neck supple  Right lower leg: Edema present  Left lower leg: Edema present  Lymphadenopathy:      Cervical: No cervical adenopathy  Skin:     General: Skin is warm and dry  Coloration: Skin is not jaundiced or pale  Findings: No lesion or rash  Neurological:      General: No focal deficit present  Mental Status: She is alert and oriented to person, place, and time  Mental status is at baseline  Cranial Nerves: No cranial nerve deficit  Motor: No weakness  Gait: Gait normal    Psychiatric:         Mood and Affect: Mood normal          Behavior: Behavior normal          Thought Content:  Thought content normal          Judgment: Judgment normal

## 2023-03-01 NOTE — ASSESSMENT & PLAN NOTE
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  She has bilateral lower extremity lymphedema from prior surgery and radiation  She has chronic intermittent enteritis/bowel dysfunction  She has new onset vaginal bleeding versus gross hematuria  Pelvic examination demonstrated radiation change without evidence of visible recurrence  Her performance status is 1   1   Assess urinalysis with culture given possible gross hematuria as there was no clear vaginal source  It is possible that she had vaginal bleeding from atrophic vaginitis that resolved  2   She has seen an IBD specialist for her chronic enteritis and a CT enterography has been ordered  3   Repeat CT of chest at the same time as her CT enterography to evaluate for disease recurrence  4   Screening mammography  5  Return in 6 months pending symptoms, imaging results  6   If she has recurrent disease, she is interested in discussing treatment

## 2023-03-21 ENCOUNTER — TELEPHONE (OUTPATIENT)
Dept: OTHER | Facility: OTHER | Age: 83
End: 2023-03-21

## 2023-03-21 DIAGNOSIS — K52.9 INFLAMMATORY BOWEL DISEASE: Primary | ICD-10-CM

## 2023-03-21 NOTE — TELEPHONE ENCOUNTER
Patient called to let Merry Zee and Dr Samantha Kiran know that she would like to go ahead with the CT enterography    Please call her to discuss further

## 2023-03-22 NOTE — TELEPHONE ENCOUNTER
I connected with the patient on the phone  Pt has decided to schedule CT chest and CT small bowel enterography for 4/4/2023  Pt will need BUN/Creatinue prior to imaging

## 2023-03-27 ENCOUNTER — APPOINTMENT (OUTPATIENT)
Dept: LAB | Facility: MEDICAL CENTER | Age: 83
End: 2023-03-27

## 2023-03-27 DIAGNOSIS — K52.9 INFLAMMATORY BOWEL DISEASE: ICD-10-CM

## 2023-03-27 LAB
BUN SERPL-MCNC: 24 MG/DL (ref 5–25)
CREAT SERPL-MCNC: 0.79 MG/DL (ref 0.6–1.3)
GFR SERPL CREATININE-BSD FRML MDRD: 68 ML/MIN/1.73SQ M

## 2023-04-07 ENCOUNTER — TELEPHONE (OUTPATIENT)
Dept: GASTROENTEROLOGY | Facility: CLINIC | Age: 83
End: 2023-04-07

## 2023-04-07 NOTE — TELEPHONE ENCOUNTER
I spoke with the patient  Pt is scheduled 4/19 with Dr Carissa Mcdowell and will discuss CT scan imaging at that appointment since prior imaging was canceled by the patient

## 2023-04-07 NOTE — TELEPHONE ENCOUNTER
Patient called in stating that she just wanted Yajaira to know why she missed her CT Scan appt  Patient stated that her  was admitted to the hospital and they are waiting for his biopsy results  Pt stated that she will be at her next appt

## 2023-04-25 ENCOUNTER — TELEPHONE (OUTPATIENT)
Dept: SURGICAL ONCOLOGY | Facility: CLINIC | Age: 83
End: 2023-04-25

## 2023-04-25 NOTE — TELEPHONE ENCOUNTER
Patient called in wanting to cancel her appointment with Dr Sumeet Martínez on 5/9/23  She was unable to do the procedure on 4/4/23 due to 's health  The patient would like to do the procedure but at a later date due to 's health  She would like for you to give her a call back at [phone]

## 2023-04-26 NOTE — TELEPHONE ENCOUNTER
Return call placed to patient  Her  was diagnosed with bile duct cancer and has been placed on hospice  She will coordinate testing when available  At this time, will keep her September f/u appt

## 2023-05-01 ENCOUNTER — HOSPITAL ENCOUNTER (OUTPATIENT)
Dept: MAMMOGRAPHY | Facility: MEDICAL CENTER | Age: 83
Discharge: HOME | End: 2023-05-01

## 2023-05-01 VITALS — WEIGHT: 124.34 LBS | BODY MASS INDEX: 24.41 KG/M2 | HEIGHT: 60 IN

## 2023-05-01 DIAGNOSIS — Z12.31 ENCOUNTER FOR SCREENING MAMMOGRAM FOR BREAST CANCER: ICD-10-CM

## 2023-05-11 ENCOUNTER — OFFICE VISIT (OUTPATIENT)
Dept: URGENT CARE | Facility: MEDICAL CENTER | Age: 83
End: 2023-05-11

## 2023-05-11 VITALS
TEMPERATURE: 98.5 F | DIASTOLIC BLOOD PRESSURE: 80 MMHG | OXYGEN SATURATION: 98 % | SYSTOLIC BLOOD PRESSURE: 142 MMHG | HEART RATE: 68 BPM | RESPIRATION RATE: 18 BRPM

## 2023-05-11 DIAGNOSIS — S61.239A PUNCTURE WOUND OF FINGER OF RIGHT HAND, INITIAL ENCOUNTER: Primary | ICD-10-CM

## 2023-05-11 RX ORDER — AMOXICILLIN 875 MG/1
875 TABLET, COATED ORAL 2 TIMES DAILY
Qty: 6 TABLET | Refills: 0 | Status: SHIPPED | OUTPATIENT
Start: 2023-05-11 | End: 2023-05-14
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
## 2023-05-11 NOTE — PATIENT INSTRUCTIONS
Patient is afebrile  Patient was able to contact her PCP and found out her last tetanus shot was January 2018 within 5 years  Does not require tetanus booster  As a precaution, I empirically on amoxicillin 875 mg twice a day for 3 days  Advised patient to change dressing daily  Puncture Wound   WHAT YOU NEED TO KNOW:   A puncture wound is a hole in the skin made by a sharp, pointed object  The area may be bruised or swollen  You may have bleeding, pain, or trouble moving the affected area  DISCHARGE INSTRUCTIONS:   Return to the emergency department if:   You have severe pain  You have numbness or tingling in the area of your wound  Your wound starts bleeding and does not stop, even after you apply pressure  Call your doctor if:   You have new drainage or a bad odor coming from the wound  You have a fever or chills  You have increased swelling, redness, or pain  You have red streaks on your skin coming from your wound  You have questions or concerns about your condition or care  Medicines: You may need any of the following:  NSAIDs , such as ibuprofen, help decrease swelling, pain, and fever  This medicine is available with or without a doctor's order  NSAIDs can cause stomach bleeding or kidney problems in certain people  If you take blood thinner medicine, always ask your healthcare provider if NSAIDs are safe for you  Always read the medicine label and follow directions  Antibiotics  help prevent a bacterial infection  Take your medicine as directed  Contact your healthcare provider if you think your medicine is not helping or if you have side effects  Tell your provider if you are allergic to any medicine  Keep a list of the medicines, vitamins, and herbs you take  Include the amounts, and when and why you take them  Bring the list or the pill bottles to follow-up visits  Carry your medicine list with you in case of an emergency      Care for your wound as directed: Keep your wound clean and dry  When you are allowed to bathe, carefully wash the wound with soap and water  Dry the area and put on new, clean bandages as directed  Change your bandages when they get wet or dirty  Rest and elevate  the injured area above the level of your heart as often as you can  This will help decrease swelling and pain  Prop your injured area on pillows or blankets to keep it elevated comfortably  Follow up with your doctor in 2 to 3 days:  Write down your questions so you remember to ask them during your visits  © Copyright [employer] 2022 Information is for End User's use only and may not be sold, redistributed or otherwise used for commercial purposes  The above information is an  only  It is not intended as medical advice for individual conditions or treatments  Talk to your doctor, nurse or pharmacist before following any medical regimen to see if it is safe and effective for you

## 2023-05-11 NOTE — PROGRESS NOTES
[facility]        NAME: Kinza Gottlieb is a 78 y o  female  : 1940    MRN: [id number]  DATE: May 11, 2023  TIME: 8:47 AM    Assessment and Plan   No primary diagnosis found  No diagnosis found  Patient Instructions       Follow up with PCP in 3-5 days  Proceed to  ER if symptoms worsen  Chief Complaint   No chief complaint on file  History of Present Illness       Patient is an 79 yo female with no significant PMH presenting in the clinic today for hand pain x days  Admits  Denies  Admits the use of __ for symptom management  Review of Systems   Review of Systems   Constitutional: Negative for chills and fever  Respiratory: Negative for shortness of breath  Cardiovascular: Negative for chest pain  Musculoskeletal: Positive for arthralgias  Negative for joint swelling  Skin: Negative for rash and wound  Neurological: Negative for numbness           Current Medications       Current Outpatient Medications:   •  acetaminophen (TYLENOL) 650 mg CR tablet, Take 650 mg by mouth every 8 (eight) hours as needed  , Disp: , Rfl:   •  ascorbic acid (VITAMIN C) 1000 MG tablet, Take 1,000 mg by mouth, Disp: , Rfl:   •  aspirin (ECOTRIN LOW STRENGTH) 81 mg EC tablet, Take by mouth, Disp: , Rfl:   •  Calcium Polycarbophil (Fiber) 625 MG TABS, Take by mouth daily, Disp: , Rfl:   •  CALCIUM-MAGNESIUM-VITAMIN D PO, Take by mouth, Disp: , Rfl:   •  Cholecalciferol (VITAMIN D3) 2000 units capsule, Take 1 tablet by mouth continuous as needed  , Disp: , Rfl:   •  cycloSPORINE (Restasis) 0 05 % ophthalmic emulsion, Administer 1 drop to both eyes every 12 (twelve) hours  , Disp: , Rfl:   •  DAILY MULTIPLE VITAMINS PO, Take 1 tablet by mouth daily, Disp: , Rfl:   •  diazepam (VALIUM) 5 mg tablet, Take 2 5 mg by mouth every 6 (six) hours as needed for anxiety, Disp: , Rfl:   •  docusate sodium (COLACE) 50 mg capsule, Take 1 capsule (50 mg total) by mouth 2 (two) times a day as needed for constipation, Disp: 90 capsule, Rfl: 3  •  famotidine (PEPCID) 20 mg tablet, Take 20 mg by mouth daily  , Disp: , Rfl:   •  fluticasone (FLONASE) 50 mcg/act nasal spray, 2 sprays into each nostril as needed, Disp: , Rfl:   •  Garlic 507 MG TABS, Take by mouth, Disp: , Rfl:   •  Glutathione POWD, Take by mouth daily  , Disp: , Rfl:   •  guaiFENesin (MUCINEX) 600 mg 12 hr tablet, Take 1,200 mg by mouth, Disp: , Rfl:   •  lisinopril (ZESTRIL) 2 5 mg tablet, Take 2 5 mg by mouth daily, Disp: , Rfl: 3  •  METRONIDAZOLE, TOPICAL, 0 75 % LOTN, metronidazole 0 75 % lotion  APPLY 1 2 TIMES DAILY TO FACE 90 DAY SUPPLY, Disp: , Rfl:   •  Omega-3 Fatty Acids (FISH OIL) 1,000 mg, Take 1,000 mg by mouth daily, Disp: , Rfl:   •  omeprazole (PriLOSEC) 20 mg delayed release capsule, Take 20 mg by mouth, Disp: , Rfl:   •  ondansetron (ZOFRAN) 4 mg tablet, Take 1 tablet (4 mg total) by mouth every 8 (eight) hours as needed for nausea or vomiting, Disp: 20 tablet, Rfl: 0  •  Probiotic Product (PROBIOTIC-10) CAPS, Take by mouth daily, Disp: , Rfl:   •  sodium chloride (OCEAN) 0 65 % nasal spray, 1 spray into each nostril daily, Disp: , Rfl:   •  Specialty Vitamins Products (ONE-A-DAY BONE STRENGTH PO), Take 3 capsules by mouth daily  , Disp: , Rfl:   •  SYNTHROID 50 MCG tablet, Take 50 mcg by mouth daily  , Disp: , Rfl:   •  TURMERIC CURCUMIN PO, Take by mouth, Disp: , Rfl:   •  Ubiquinol 100 MG CAPS, Take 1 capsule by mouth daily  , Disp: , Rfl:     Current Allergies     Allergies as of 05/11/2023 - Reviewed 05/01/2023   Allergen Reaction Noted   • Iodinated contrast media Diarrhea 05/07/2021   • Iron sucrose Other (See Comments), Anaphylaxis, and Shortness Of Breath 11/08/2021   • Bee pollen Allergic Rhinitis 09/26/2022   • Misc  sulfonamide containing compounds Hives 12/07/2022   • Other Other (See Comments) and Itching 03/04/2014   • Wound dressing adhesive Other (See Comments) 09/15/2014   • Medical tape Itching and Rash 12/07/2022 • Oxybutynin Rash 11/18/2019   • Pollen extract Allergic Rhinitis and Other (See Comments) 04/25/2016   • Sulfa antibiotics Rash 08/27/2012   • Wound dressings Rash 03/04/2014            The following portions of the patient's history were reviewed and updated as appropriate: allergies, current medications, past family history, past medical history, past social history, past surgical history and problem list      Past Medical History:   Diagnosis Date   • Cervical cancer ([facility] [address] ) 01/16/2011   • History of chemotherapy     age 65   • History of DVT (deep vein thrombosis)    • History of radiation therapy     age 65       Past Surgical History:   Procedure Laterality Date   • BREAST EXCISIONAL BIOPSY Left 1985    benign   • HYSTERECTOMY      age71   • OOPHORECTOMY Bilateral     age 65       Family History   Problem Relation Age of Onset   • No Known Problems Mother    • No Known Problems Father    • No Known Problems Daughter    • No Known Problems Maternal Grandmother    • No Known Problems Maternal Grandfather    • No Known Problems Paternal Grandmother    • No Known Problems Paternal Grandfather          Medications have been verified  Objective   There were no vitals taken for this visit  Physical Exam     Physical Exam  Vitals reviewed  Constitutional:       General: She is not in acute distress  Appearance: Normal appearance  She is normal weight  She is not ill-appearing  HENT:      Head: Normocephalic  Nose: Nose normal       Mouth/Throat:      Mouth: Mucous membranes are moist    Eyes:      Conjunctiva/sclera: Conjunctivae normal    Cardiovascular:      Rate and Rhythm: Normal rate and regular rhythm  Pulses: Normal pulses  Heart sounds: Normal heart sounds  No murmur heard  No friction rub  No gallop  Pulmonary:      Effort: Pulmonary effort is normal       Breath sounds: Normal breath sounds  No wheezing, rhonchi or rales  Skin:     General: Skin is warm  Neurological:      Mental Status: She is alert     Psychiatric:         Mood and Affect: Mood normal          Behavior: Behavior normal

## 2023-06-06 ENCOUNTER — TELEPHONE (OUTPATIENT)
Dept: HEMATOLOGY ONCOLOGY | Facility: CLINIC | Age: 83
End: 2023-06-06

## 2023-06-06 NOTE — TELEPHONE ENCOUNTER
"Patient Call    Who are you speaking with? [facility] OT     If it is not the patient, are they listed on an active communication consent form? No   What is the reason for this call? Channing Em is requesting for a script to be sent over to [facility]  Channing Em states that the script must say \"Lymph OT\"    Does this require a call back? N/A   Please call with any further questions  If a call back is required, please list best call back number Phone:[phone]  Fax: [phone]   If a call back is required, advise that a message will be forwarded to their care team and someone will return their call as soon as possible  Did you relay this information to the patient?  Yes     "

## 2023-06-07 ENCOUNTER — TELEPHONE (OUTPATIENT)
Dept: HEMATOLOGY ONCOLOGY | Facility: CLINIC | Age: 83
End: 2023-06-07

## 2023-06-07 DIAGNOSIS — I89.0 LYMPHEDEMA OF BOTH LOWER EXTREMITIES: ICD-10-CM

## 2023-06-07 DIAGNOSIS — R60.0 EDEMA OF LEFT LOWER EXTREMITY: Primary | ICD-10-CM

## 2023-06-07 NOTE — TELEPHONE ENCOUNTER
Patient Call    Who are you speaking with? Mayra Villavicencio OT     If it is not the patient, are they listed on an active communication consent form? N/A   What is the reason for this call? Jumana Diallo was transferred over to [facility] to go over the patient's OT referral     Does this require a call back? No   If a call back is required, please list best call back number N/A   If a call back is required, advise that a message will be forwarded to their care team and someone will return their call as soon as possible  Did you relay this information to the patient?  No

## 2023-06-07 NOTE — TELEPHONE ENCOUNTER
Patient Call    Who are you speaking with? Patient    If it is not the patient, are they listed on an active communication consent form? N/A   What is the reason for this call? Patient is requesting for Carlos Benavidez to fax a script over to [facility], the patient states Carlos Benavidez has done this before  The patient states Carlos Benavidez should know what to do when I told her I would have Carlos Benavidez give her a call back so that the patient could explain it to [facility] better  Fax number for [facility]: [phone]   Does this require a call back? N/A   Please call the patient for further questions  If a call back is required, please list best call back number [phone] or [phone]   If a call back is required, advise that a message will be forwarded to their care team and someone will return their call as soon as possible  Did you relay this information to the patient?  Yes

## 2023-06-08 ENCOUNTER — TELEPHONE (OUTPATIENT)
Dept: GYNECOLOGIC ONCOLOGY | Facility: CLINIC | Age: 83
End: 2023-06-08

## 2023-06-08 NOTE — TELEPHONE ENCOUNTER
Called and informed patient that I faxed over her referral to physical therapy  Patient was very appreciative of this

## 2023-06-08 NOTE — TELEPHONE ENCOUNTER
Referral placed and faxed yesterday  Please re-fax and notify patient this has now been done twice  Thanks!

## 2023-08-22 ENCOUNTER — TELEPHONE (OUTPATIENT)
Dept: HEMATOLOGY ONCOLOGY | Facility: CLINIC | Age: 83
End: 2023-08-22

## 2023-08-22 NOTE — TELEPHONE ENCOUNTER
Appointment Change  Cancel, Reschedule, Change to Virtual      Who are you speaking with? Patient   If it is not the patient, are they listed on an active communication consent form? N/A   Which provider is the appointment scheduled with? Dr. Alena Muñoz   When is the appointment scheduled? Please list date and time  09/25/2023 @1PM    At which location is the appointment scheduled to take place? [place]   Was the appointment rescheduled or changed from an in person visit to a virtual visit? If so, please list the details of the change. No, will call back to reschedule. What is the reason for the appointment change? Patient did not complete testing and she has a lot going on. Was [facility]AR transport scheduled for this visit? No   Does STAR transport need to be scheduled for the new visit (if applicable) No   Does the patient need an infusion appointment rescheduled? No   Does the patient have an infusion appointment scheduled? If so, when? No   Is the patient undergoing chemotherapy? No   Was the no-show policy reviewed for appointments being changed with less then 24 hours of notice?  No

## 2024-02-05 ENCOUNTER — TELEPHONE (OUTPATIENT)
Dept: HEMATOLOGY ONCOLOGY | Facility: CLINIC | Age: 84
End: 2024-02-05

## 2024-02-05 DIAGNOSIS — I89.0 LYMPHEDEMA OF BOTH LOWER EXTREMITIES: Primary | ICD-10-CM

## 2024-02-05 NOTE — TELEPHONE ENCOUNTER
Patient Call    Who are you speaking with? Physician Office    If it is not the patient, are they listed on an active communication consent form? N/A   What is the reason for this call? Alana sch[facility] [address] advising needing a Lymph OT fax to them Fax: [phone]    Advised it needs to be sent by end of the day    Does this require a call back? Yes   If a call back is required, please list best call back number [phone]  opt 2   If a call back is required, advise that a message will be forwarded to their care team and someone will return their call as soon as possible.   Did you relay this information to the patient? Yes

## 2024-02-05 NOTE — TELEPHONE ENCOUNTER
Called and verified that they received the referral.  The office confirmed that they received it.

## 2024-03-21 ENCOUNTER — NURSE TRIAGE (OUTPATIENT)
Age: 84
End: 2024-03-21

## 2024-03-21 NOTE — TELEPHONE ENCOUNTER
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 Reports BMs are hard pellets for 3 past days. Tried Colace stool softener. Follows low residue diet. States she knows she is dehydrated. Encouraged pt to hydrate as tolerated. Pt concerned over being able to drink/produce enough urine and that she will need another catheter.     Denies abdominal pain/distention, nausea vomiting. Due to Kidney Hx, reviewed Miralax, Dulcolax, MOM, suppositories as options. Pt to try MOM with prune juice. Please advise if you have further recommendations.    Pt [phone]       Reason for Disposition  • MILD constipation    Answer Assessment - Initial Assessment Questions  1. STOOL PATTERN OR FREQUENCY: \"How often do you pass bowel movements (BMs)?\"  (Normal range: tid to q 3 days)  \"When was the last BM passed?\"        Small pellets for past 3 days  2. STRAINING: \"Do you have to strain to have a BM?\"       No  3. RECTAL PAIN: \"Does your rectum hurt when the stool comes out?\" If Yes, ask: \"Do you have hemorrhoids? How bad is the pain?\"  (Scale 1-10; or mild, moderate, severe)      No  4. STOOL COMPOSITION: \"Are the stools hard?\"       Hard pellets  5. BLOOD ON STOOLS: \"Has there been any blood on the toilet tissue or on the surface of the BM?\" If Yes, ask: \"When was the last time?\"       Denies  6. CHRONIC CONSTIPATION: \"Is this a new problem for you?\"  If no, ask: \"How long have you had this problem?\" (days, weeks, months)       Yes chornic  7. CHANGES IN DIET OR HYDRATION: \"Have there been any recent changes in your diet?\" \"How much fluids are you drinking consuming on a daily basis?\"  \"How much have you had to drink today?\"      Dehydrated   8. MEDICATIONS: \"Have you been taking any new medications?\" \"Are you taking any narcotic pain medications?\" (e.g., Vicoden, Percocet, morphine, dilaudid)      Unknown   9. " "LAXATIVES: \"Have you been using any stool softeners, laxatives, or enemas?\"  If yes, ask \"What, how often, and when was the last time?\"  10.ACTIVITY:  \"How much walking do you do every day? on a daily basis?\"  \"Has your activity level decreased in the past week?\"         Colace  11. CAUSE: \"What do you think is causing the constipation?\"         Unsure  12. OTHER SYMPTOMS: \"Do you have any other symptoms?\" (e.g., abdominal pain, bloating, fever, vomiting)        Denies    Protocols used: Constipation-ADULT-[place]    "

## 2024-03-25 NOTE — TELEPHONE ENCOUNTER
Pt. Took prune juice and milk of magnesia since last call, had diarrhea and has a pouch which she was able to manage, pt. Taking bentyl as directed to help slow up her bowels, pt. Concerned because she is having formed softer stools, advised that if taking the bentyl is helping to continue but if she does not feel constipated it would be ok to use bentyl only as needed, pt. Also reached out to palliative care because she is starting with UTI symptoms, advised that UTI can also cause diarrhea and GI upset, pt. Will call back with any additional concerns

## 2024-05-21 ENCOUNTER — TELEPHONE (OUTPATIENT)
Dept: GASTROENTEROLOGY | Facility: CLINIC | Age: 84
End: 2024-05-21

## 2024-05-21 NOTE — TELEPHONE ENCOUNTER
LVM, due to provider schedule change patient appt was change to 10/08/24 at 9:20, left call back number

## 2024-09-15 ENCOUNTER — OFFICE VISIT (OUTPATIENT)
Dept: URGENT CARE | Facility: MEDICAL CENTER | Age: 84
End: 2024-09-15
Payer: MEDICARE

## 2024-09-15 ENCOUNTER — HOSPITAL ENCOUNTER (INPATIENT)
Facility: HOSPITAL | Age: 84
LOS: 6 days | Discharge: HOME HEALTH | DRG: 982 | End: 2024-09-21
Attending: EMERGENCY MEDICINE | Admitting: INTERNAL MEDICINE
Payer: MEDICARE

## 2024-09-15 ENCOUNTER — APPOINTMENT (EMERGENCY)
Dept: CT IMAGING | Facility: HOSPITAL | Age: 84
DRG: 982 | End: 2024-09-15
Payer: MEDICARE

## 2024-09-15 VITALS
OXYGEN SATURATION: 99 % | DIASTOLIC BLOOD PRESSURE: 65 MMHG | SYSTOLIC BLOOD PRESSURE: 138 MMHG | TEMPERATURE: 97 F | RESPIRATION RATE: 18 BRPM | HEART RATE: 89 BPM

## 2024-09-15 DIAGNOSIS — M79.674 PAIN OF TOE OF RIGHT FOOT: Primary | ICD-10-CM

## 2024-09-15 DIAGNOSIS — I75.021 BLUE TOE SYNDROME, RIGHT (HCC): Primary | ICD-10-CM

## 2024-09-15 DIAGNOSIS — Z86.718 HISTORY OF DVT (DEEP VEIN THROMBOSIS): ICD-10-CM

## 2024-09-15 DIAGNOSIS — M60.009 ABSCESS OF MUSCLE: ICD-10-CM

## 2024-09-15 DIAGNOSIS — D64.9 CHRONIC ANEMIA: ICD-10-CM

## 2024-09-15 DIAGNOSIS — C79.9 METASTASIS FROM CERVICAL CANCER (HCC): ICD-10-CM

## 2024-09-15 DIAGNOSIS — C53.9 METASTASIS FROM CERVICAL CANCER (HCC): ICD-10-CM

## 2024-09-15 DIAGNOSIS — T83.83XA NEPHROSTOMY TUBE BLEED (HCC): ICD-10-CM

## 2024-09-15 PROBLEM — R58: Status: ACTIVE | Noted: 2024-09-15

## 2024-09-15 PROBLEM — Z90.49 H/O COLECTOMY: Status: ACTIVE | Noted: 2024-09-15

## 2024-09-15 PROBLEM — I99.8 ISCHEMIA OF TOE: Status: ACTIVE | Noted: 2024-09-15

## 2024-09-15 PROBLEM — I74.9 EMBOLUS (HCC): Status: ACTIVE | Noted: 2024-09-15

## 2024-09-15 LAB
ANION GAP SERPL CALCULATED.3IONS-SCNC: 7 MMOL/L (ref 4–13)
APTT PPP: 31 SECONDS (ref 23–34)
BASOPHILS # BLD AUTO: 0.03 THOUSANDS/ΜL (ref 0–0.1)
BASOPHILS NFR BLD AUTO: 0 % (ref 0–1)
BUN SERPL-MCNC: 21 MG/DL (ref 5–25)
CALCIUM SERPL-MCNC: 9.3 MG/DL (ref 8.4–10.2)
CHLORIDE SERPL-SCNC: 98 MMOL/L (ref 96–108)
CO2 SERPL-SCNC: 29 MMOL/L (ref 21–32)
CREAT SERPL-MCNC: 0.81 MG/DL (ref 0.6–1.3)
EOSINOPHIL # BLD AUTO: 0.04 THOUSAND/ΜL (ref 0–0.61)
EOSINOPHIL NFR BLD AUTO: 1 % (ref 0–6)
ERYTHROCYTE [DISTWIDTH] IN BLOOD BY AUTOMATED COUNT: 17.2 % (ref 11.6–15.1)
GFR SERPL CREATININE-BSD FRML MDRD: 66 ML/MIN/1.73SQ M
GLUCOSE SERPL-MCNC: 105 MG/DL (ref 65–140)
HCT VFR BLD AUTO: 32.4 % (ref 34.8–46.1)
HGB BLD-MCNC: 10 G/DL (ref 11.5–15.4)
IMM GRANULOCYTES # BLD AUTO: 0.04 THOUSAND/UL (ref 0–0.2)
IMM GRANULOCYTES NFR BLD AUTO: 1 % (ref 0–2)
INR PPP: 1.2 (ref 0.85–1.19)
LYMPHOCYTES # BLD AUTO: 0.69 THOUSANDS/ΜL (ref 0.6–4.47)
LYMPHOCYTES NFR BLD AUTO: 8 % (ref 14–44)
MCH RBC QN AUTO: 27.5 PG (ref 26.8–34.3)
MCHC RBC AUTO-ENTMCNC: 30.9 G/DL (ref 31.4–37.4)
MCV RBC AUTO: 89 FL (ref 82–98)
MONOCYTES # BLD AUTO: 0.61 THOUSAND/ΜL (ref 0.17–1.22)
MONOCYTES NFR BLD AUTO: 7 % (ref 4–12)
NEUTROPHILS # BLD AUTO: 7.15 THOUSANDS/ΜL (ref 1.85–7.62)
NEUTS SEG NFR BLD AUTO: 83 % (ref 43–75)
NRBC BLD AUTO-RTO: 0 /100 WBCS
PLATELET # BLD AUTO: 296 THOUSANDS/UL (ref 149–390)
PMV BLD AUTO: 8.5 FL (ref 8.9–12.7)
POTASSIUM SERPL-SCNC: 3.9 MMOL/L (ref 3.5–5.3)
PROTHROMBIN TIME: 15.3 SECONDS (ref 12.3–15)
RBC # BLD AUTO: 3.63 MILLION/UL (ref 3.81–5.12)
SODIUM SERPL-SCNC: 134 MMOL/L (ref 135–147)
WBC # BLD AUTO: 8.56 THOUSAND/UL (ref 4.31–10.16)

## 2024-09-15 PROCEDURE — 96365 THER/PROPH/DIAG IV INF INIT: CPT

## 2024-09-15 PROCEDURE — 99214 OFFICE O/P EST MOD 30 MIN: CPT | Performed by: PHYSICIAN ASSISTANT

## 2024-09-15 PROCEDURE — 93005 ELECTROCARDIOGRAM TRACING: CPT

## 2024-09-15 PROCEDURE — 85730 THROMBOPLASTIN TIME PARTIAL: CPT | Performed by: EMERGENCY MEDICINE

## 2024-09-15 PROCEDURE — 99222 1ST HOSP IP/OBS MODERATE 55: CPT | Performed by: SURGERY

## 2024-09-15 PROCEDURE — 99284 EMERGENCY DEPT VISIT MOD MDM: CPT

## 2024-09-15 PROCEDURE — 99222 1ST HOSP IP/OBS MODERATE 55: CPT | Performed by: NURSE PRACTITIONER

## 2024-09-15 PROCEDURE — 85610 PROTHROMBIN TIME: CPT | Performed by: EMERGENCY MEDICINE

## 2024-09-15 PROCEDURE — 80048 BASIC METABOLIC PNL TOTAL CA: CPT | Performed by: EMERGENCY MEDICINE

## 2024-09-15 PROCEDURE — 36415 COLL VENOUS BLD VENIPUNCTURE: CPT | Performed by: EMERGENCY MEDICINE

## 2024-09-15 PROCEDURE — G0463 HOSPITAL OUTPT CLINIC VISIT: HCPCS | Performed by: PHYSICIAN ASSISTANT

## 2024-09-15 PROCEDURE — 96375 TX/PRO/DX INJ NEW DRUG ADDON: CPT

## 2024-09-15 PROCEDURE — 75635 CT ANGIO ABDOMINAL ARTERIES: CPT

## 2024-09-15 PROCEDURE — 96361 HYDRATE IV INFUSION ADD-ON: CPT

## 2024-09-15 PROCEDURE — 99285 EMERGENCY DEPT VISIT HI MDM: CPT | Performed by: EMERGENCY MEDICINE

## 2024-09-15 PROCEDURE — 85025 COMPLETE CBC W/AUTO DIFF WBC: CPT | Performed by: EMERGENCY MEDICINE

## 2024-09-15 PROCEDURE — 70450 CT HEAD/BRAIN W/O DYE: CPT

## 2024-09-15 RX ORDER — CEFPODOXIME PROXETIL 200 MG/1
TABLET, FILM COATED ORAL
COMMUNITY
Start: 2024-09-06 | End: 2024-09-21

## 2024-09-15 RX ORDER — DICYCLOMINE HYDROCHLORIDE 10 MG/1
CAPSULE ORAL
COMMUNITY
Start: 2024-07-08

## 2024-09-15 RX ORDER — DOCUSATE SODIUM 100 MG/1
100 CAPSULE, LIQUID FILLED ORAL ONCE
Status: DISCONTINUED | OUTPATIENT
Start: 2024-09-15 | End: 2024-09-16

## 2024-09-15 RX ADMIN — IOHEXOL 110 ML: 350 INJECTION, SOLUTION INTRAVENOUS at 20:58

## 2024-09-15 RX ADMIN — SODIUM CHLORIDE 500 ML: 0.9 INJECTION, SOLUTION INTRAVENOUS at 20:40

## 2024-09-15 RX ADMIN — MORPHINE SULFATE 2 MG: 2 INJECTION, SOLUTION INTRAMUSCULAR; INTRAVENOUS at 21:06

## 2024-09-15 RX ADMIN — DEXTROSE 1000 MG: 50 INJECTION, SOLUTION INTRAVENOUS at 23:18

## 2024-09-15 NOTE — Clinical Note
Case was discussed with LACHELLE and the patient's admission status was agreed to be Admission Status: inpatient status to the service of

## 2024-09-15 NOTE — PROGRESS NOTES
[facility]        NAME: Patsy Dallas is a 83 y.o. female  : 1940    MRN: [id number]  DATE: September 15, 2024  TIME: 4:26 PM    Assessment and Plan   Pain of toe of right foot [M79.674]  1. Pain of toe of right foot  Transfer to other facility            Patient Instructions       I explained to the patient that this appears to be a micro emboli and needs further evaluation that we cannot provide here.  She is going to the emergency room for further evaluation.    If tests have been performed at [facility], our office will contact you with results if changes need to be made to the care plan discussed with you at the visit.  You can review your full results on [facility].    Chief Complaint     Chief Complaint   Patient presents with    Toe Pain     Patient c/o right toe pain with discoloration x 1 week. Patient noted she was on vacation.          History of Present Illness       Patient with increasing toe pain.  She has a rich vascular history.  Her right great toe is becoming more black and cold.    Toe Pain   The incident occurred 3 to 5 days ago. The pain is severe. The pain has been Worsening since onset.       Review of Systems   Review of Systems   All other systems reviewed and are negative.        Current Medications       Current Outpatient Medications:     BD PosiFlush 0.9 % SOLN, , Disp: , Rfl:     cefpodoxime (VANTIN) 200 mg tablet, take 1 tablet by mouth 2 times a day for 7 days, Disp: , Rfl:     dicyclomine (BENTYL) 10 mg capsule, TAKE 1 CAPSULE BY MOUTH 3 TIMES A DAY AS NEEDED FOR CRAMPING, Disp: , Rfl:     acetaminophen (TYLENOL) 650 mg CR tablet, Take 650 mg by mouth every 8 (eight) hours as needed  , Disp: , Rfl:     ascorbic acid (VITAMIN C) 1000 MG tablet, Take 1,000 mg by mouth, Disp: , Rfl:     aspirin (ECOTRIN LOW STRENGTH) 81 mg EC tablet, Take by mouth, Disp: , Rfl:     ASPIRIN 81 PO, Take 1 tablet every day by oral route., Disp: , Rfl:     Calcium  Polycarbophil (Fiber) 625 MG TABS, Take by mouth daily, Disp: , Rfl:     CALCIUM-MAGNESIUM-VITAMIN D PO, Take by mouth, Disp: , Rfl:     Cholecalciferol (VITAMIN D3) 2000 units capsule, Take 1 tablet by mouth continuous as needed  , Disp: , Rfl:     cycloSPORINE (Restasis) 0.05 % ophthalmic emulsion, Administer 1 drop to both eyes every 12 (twelve) hours  , Disp: , Rfl:     DAILY MULTIPLE VITAMINS PO, Take 1 tablet by mouth daily, Disp: , Rfl:     diazepam (VALIUM) 5 mg tablet, Take 2.5 mg by mouth every 6 (six) hours as needed for anxiety, Disp: , Rfl:     docusate sodium (COLACE) 50 mg capsule, Take 1 capsule (50 mg total) by mouth 2 (two) times a day as needed for constipation, Disp: 90 capsule, Rfl: 3    famotidine (PEPCID) 20 mg tablet, Take 20 mg by mouth daily  , Disp: , Rfl:     fluticasone (FLONASE) 50 mcg/act nasal spray, 2 sprays into each nostril as needed, Disp: , Rfl:     Garlic 100 MG TABS, Take by mouth, Disp: , Rfl:     Glutathione POWD, Take by mouth daily  , Disp: , Rfl:     guaiFENesin (MUCINEX) 600 mg 12 hr tablet, Take 1,200 mg by mouth, Disp: , Rfl:     lisinopril (ZESTRIL) 2.5 mg tablet, Take 2.5 mg by mouth daily, Disp: , Rfl: 3    METRONIDAZOLE, TOPICAL, 0.75 % LOTN, metronidazole 0.75 % lotion  APPLY 1 2 TIMES DAILY TO FACE 90 DAY SUPPLY, Disp: , Rfl:     Omega-3 Fatty Acids (FISH OIL) 1,000 mg, Take 1,000 mg by mouth daily, Disp: , Rfl:     omeprazole (PriLOSEC) 20 mg delayed release capsule, Take 20 mg by mouth, Disp: , Rfl:     ondansetron (ZOFRAN) 4 mg tablet, Take 1 tablet (4 mg total) by mouth every 8 (eight) hours as needed for nausea or vomiting, Disp: 20 tablet, Rfl: 0    Probiotic Product (PROBIOTIC-10) CAPS, Take by mouth daily, Disp: , Rfl:     sodium chloride (OCEAN) 0.65 % nasal spray, 1 spray into each nostril daily, Disp: , Rfl:     Specialty Vitamins Products (ONE-A-DAY BONE STRENGTH PO), Take 3 capsules by mouth daily  , Disp: , Rfl:     SYNTHROID 50 MCG tablet, Take 50  mcg by mouth daily  , Disp: , Rfl:     TURMERIC CURCUMIN PO, Take by mouth, Disp: , Rfl:     Ubiquinol 100 MG CAPS, Take 1 capsule by mouth daily  , Disp: , Rfl:     Current Allergies     Allergies as of 09/15/2024 - Reviewed 09/15/2024   Allergen Reaction Noted    Iodinated contrast media Diarrhea 05/07/2021    Iron sucrose Anaphylaxis, Other (See Comments), and Shortness Of Breath 11/08/2021    Bee pollen Allergic Rhinitis 09/26/2022    Ibuprofen GI Bleeding 12/07/2023    Misc. sulfonamide containing compounds Hives 12/07/2022    Wound dressing adhesive Other (See Comments) 09/15/2014    Medical tape Itching, Rash, and Other (See Comments) 12/07/2022    Other Itching, Other (See Comments), Diarrhea, and Hives 03/04/2014    Oxybutynin Rash and Other (See Comments) 11/18/2019    Pollen extract Allergic Rhinitis and Other (See Comments) 04/25/2016    Sulfa antibiotics Rash and Other (See Comments) 08/27/2012    Sulfur Rash 06/14/2024    Wound dressings Rash 03/04/2014            The following portions of the patient's history were reviewed and updated as appropriate: allergies, current medications, past family history, past medical history, past social history, past surgical history and problem list.     Past Medical History:   Diagnosis Date    Cervical cancer (HCC) 01/16/2011    History of chemotherapy     age 70    History of DVT (deep vein thrombosis)     History of radiation therapy     age 70       Past Surgical History:   Procedure Laterality Date    BREAST EXCISIONAL BIOPSY Left 1985    benign    HYSTERECTOMY      age71    OOPHORECTOMY Bilateral     age 70       Family History   Problem Relation Age of Onset    No Known Problems Mother     No Known Problems Father     No Known Problems Daughter     No Known Problems Maternal Grandmother     No Known Problems Maternal Grandfather     No Known Problems Paternal Grandmother     No Known Problems Paternal Grandfather          Medications have been  verified.        Objective   /65   Pulse 89   Temp (!) 97 °F (36.1 °C)   Resp 18   SpO2 99%   No LMP recorded. Patient is postmenopausal.       Physical Exam     Physical Exam  Vitals and nursing note reviewed.   Constitutional:       Appearance: Normal appearance. She is normal weight.   Cardiovascular:      Rate and Rhythm: Normal rate. Rhythm irregular.      Pulses: Normal pulses.   Pulmonary:      Effort: Pulmonary effort is normal.      Breath sounds: Normal breath sounds.   Neurological:      Mental Status: She is alert.       Heart skipped beats    Right great toe distal tip black with surrounding erythema

## 2024-09-15 NOTE — CONSULTS
Consultation - Surgery-General   Name: Patsy Dallas 83 y.o. female I MRN: [id number]  Unit/Bed#: ED-27 I Date of Admission: 9/15/2024   Date of Service: 9/15/2024 I Hospital Day: 0   Inpatient consult to Vascular Surgery  Consult performed by: Rasheed Seals MD  Consult ordered by: Aminata Mejia DO        Physician Requesting Evaluation: Aminata Mejia DO   Reason for Evaluation / Principal Problem: Possible blue toe syndrome, possible embolic event    Assessment & Plan  Possible Embolus (HCC)  Patient is an 83-year-old female who presents with a past medical history of cervical cancer in 2011, Hodgkin's lymphoma in 2013 status post chemotherapy and radiation, metastatic disease in the lung in 2013 status post wedge resection who presented with 1 week of throbbing pain from her right groin down to her toe and discoloration of the right great toe as below.  Vascular surgery was consulted for concern of acute limb ischemia versus blue toe syndrome.  Patient has palpable femoral pulses bilaterally suggesting that the stent is open.  She also has palpable DP pulse on the right foot and dopplerable PT pulse on the right foot as well as dopplerable DP and PT pulses on the left foot making acute limb ischemia secondary to stent occlusion unlikely at this time.  However cannot rule out embolic event at this time.    -Recommend further embolic workup per ED.  -If patient's creatinine is stable, consider CTA with runoff.  -Will follow-up further workup.  -continue to monitor neurovascular exam.    Please contact the [employer] role for the Vascular Surgery service with any questions/concerns.    History of Present Illness   Patsy Dallas is a 83 y.o. female who presents with a past medical history of cervical cancer in 2011, Hodgkin's lymphoma in 2013 status post chemotherapy and radiation, metastatic disease in the lung in 2013 status post wedge resection who presented with 1 week of throbbing pain from her  right groin down to her toe and discoloration of her right great toe.    Patient reports that her symptoms have been going on for 1 week.  The pain has been pretty consistent over the last week but she does have episodes where there is sharp stabbing pain in the right toe and lower leg.  The throbbing pain is present at rest but does not worsen with exercise or walking.  Patient denies any fevers, chills, shortness of breath, chest pain nausea, vomiting.  She does report having significant lower extremity lymphedema bilaterally and has neuropathy bilaterally since chemotherapy.  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  Patient reports that she was not placed on any anticoagulation following the stent placement.  She had overall been doing well after the procedure until her symptoms started a week ago.     Review of Systems   Constitutional:  Negative for chills and fever.   HENT:  Negative for trouble swallowing.    Eyes:  Negative for visual disturbance.   Respiratory:  Negative for shortness of breath.    Cardiovascular:  Negative for chest pain.   Gastrointestinal:  Negative for abdominal pain.   Skin:  Negative for color change.   Neurological:  Negative for headaches.   Psychiatric/Behavioral:  Negative for behavioral problems.          Objective      Temp:  [97 °F (36.1 °C)-98 °F (36.7 °C)] 98 °F (36.7 °C)  HR:  [87-89] 87  Resp:  [18] 18  BP: (130-138)/(65-73) 130/73  O2 Device: None (Room air)          I/O       None          Lines/Drains/Airways       Active Status       None                  Physical Exam  Constitutional:       Appearance: Normal appearance.   HENT:      Head: Normocephalic and atraumatic.      Nose: Nose normal.   Eyes:      Conjunctiva/sclera: Conjunctivae normal.   Cardiovascular:      Rate and Rhythm: Normal rate.      Comments: Appears well-perfused.     Pulse exam significant for  palpable femoral pulses bilaterally.  Palpable DP pulse on the right foot.  She had dopplerable pulses in the left DP and bilateral PTs. photo of right great toe as below.  Pulmonary:      Effort: Pulmonary effort is normal. No respiratory distress.   Abdominal:      General: Abdomen is flat. There is no distension.      Palpations: Abdomen is soft.      Tenderness: There is no abdominal tenderness.      Comments: Ostomy present.  Nephrostomy tube present.   Musculoskeletal:         General: Swelling (Bilaterally in lower extremities, significant pitting edema) present. Normal range of motion.      Cervical back: Normal range of motion and neck supple.      Comments: She has significant pitting lymphedema in the bilateral lower extremities.   Skin:     General: Skin is warm and dry.   Neurological:      General: No focal deficit present.      Mental Status: She is alert and oriented to person, place, and time.      Comments: Patient has intact motor strength in bilateral upper and lower extremities.  She also has intact sensation to bilateral upper and lower extremities.   Psychiatric:         Mood and Affect: Mood normal.         Behavior: Behavior normal.          Lab Results: I have reviewed the following results: CBC/BMP:   .     09/15/24  1755   WBC 8.56   HGB 10.0*   HCT 32.4*      SODIUM 134*   K 3.9   CL 98   CO2 29   BUN 21   CREATININE 0.81   GLUC 105      Imaging Review: No imaging completed at time of evaluation      Administrative Statements   I have spent a total time of 25 minutes in caring for this patient on the day of the visit/encounter including Diagnostic results.

## 2024-09-15 NOTE — ASSESSMENT & PLAN NOTE
Patient is an 83-year-old female who presents with a past medical history of cervical cancer in 2011, Hodgkin's lymphoma in 2013 status post chemotherapy and radiation, metastatic disease in the lung in 2013 status post wedge resection who presented with 1 week of throbbing pain from her right groin down to her toe and discoloration of the right great toe as below.  Vascular surgery was consulted for concern of acute limb ischemia versus blue toe syndrome.  Patient has palpable femoral pulses bilaterally suggesting that the stent is open.  She also has palpable DP pulse on the right foot and dopplerable PT pulse on the right foot as well as dopplerable DP and PT pulses on the left foot making acute limb ischemia secondary to stent occlusion unlikely at this time.  However cannot rule out embolic event at this time.    -Recommend further embolic workup per ED.  -If patient's creatinine is stable, consider CTA with runoff.  -Will follow-up further workup.  -continue to monitor neurovascular exam.

## 2024-09-15 NOTE — ED PROVIDER NOTES
1. Blue toe syndrome, right (HCC)    2. Abscess of muscle      ED Disposition       ED Disposition   Admit    Condition   Stable    Date/Time   Sun Sep 15, 2024 11:52 PM    Comment   Case was discussed with LACHELLE and the patient's admission status was agreed to be Admission Status: inpatient status to the service of Dr. Flores               Assessment & Plan       Medical Decision Making  An 84 yo female presents with discoloration and pain to her right great toe, exam concerning for blue toe syndrome and cardioembolic etiology.  RLE is neurovascularly intact with palpable DP pulses of the right foot.  Will check labs to evaluate for anemia, coagulopathy, electrolyte abnormality and renal impairment.  Will check EKG for arrhyhtmia.  Will discuss with vascular surgery to determine appropriate imaging.    Amount and/or Complexity of Data Reviewed  Labs: ordered. Decision-making details documented in ED Course.  Radiology: ordered and independent interpretation performed. Decision-making details documented in ED Course.    Risk  OTC drugs.  Prescription drug management.  Decision regarding hospitalization.              ED Course as of 09/16/24 0155   Sun Sep 15, 2024   1752 Vascular surgery resident at bedside   1802 Hemoglobin(!): 10.0  Baseline    1900 Spoke with Dr. Kay, vascular fellow, reviewing pt's history, presentation and work up.  Recommends CTA with run off for further evaluation.  Agrees with remainder of cardioembolic work up   1944 Pt and family updated on recommendations for CTA, they are in agreement.  Pt states she tolerates IV contrast without issue.  Family also expresses concern for possible stroke as pt has been acting off for the past several days with slowed speech.  Reassurance that exam is normal at this time, however will obtain CTH given likelihood of needing anticoagulation.   2149 CT head without contrast  No acute intracranial abnormality.  Chronic microangiopathic changes.   2157 Family  "updated on CTH results   2242 CTA without acute source for possible embolic event.  Pt will need further evaluation inpt for cardioembolic source.  Given complex history with utero-iliac fistula s/p repair and no definitive occlusion, will hold on anticoagulation.    CTA also with \"right hip adductor intramuscular rim enhancing fluid collection\".  On review, this was not noted on prior CTAP.  Pt does complain of right inguinal and thigh pain.  On exam, she is tender to the medial right thigh, although has no overlying skin changes.  Underlying intramuscular abscess may be etiology for ongoing chills/rigors.  Pt does not meet SIRS.  Will start IV Abx.  Pt will likely need IR aspiration for further evaluation.  Pt in agreement with this.   2351 Spoke with SLIM, discussed pt's history, presentation and work up.  Will accept in admission.        Medications   ascorbic acid (VITAMIN C) tablet 500 mg (has no administration in time range)   Cholecalciferol (VITAMIN D3) tablet 1,000 Units (has no administration in time range)   multivitamin stress formula tablet 1 tablet (has no administration in time range)   diazepam (VALIUM) tablet 2.5 mg (has no administration in time range)   dicyclomine (BENTYL) capsule 10 mg (has no administration in time range)   docusate sodium (COLACE) capsule 100 mg (has no administration in time range)   lisinopril (ZESTRIL) tablet 2.5 mg (has no administration in time range)   saccharomyces boulardii (FLORASTOR) capsule 250 mg (has no administration in time range)   levothyroxine tablet 50 mcg (has no administration in time range)   oxyCODONE (ROXICODONE) split tablet 2.5 mg (has no administration in time range)     Or   oxyCODONE (ROXICODONE) IR tablet 5 mg (has no administration in time range)   HYDROmorphone HCl (DILAUDID) injection 0.2 mg (has no administration in time range)   acetaminophen (TYLENOL) tablet 975 mg (has no administration in time range)   aluminum-magnesium " hydroxide-simethicone (MAALOX) oral suspension 30 mL (has no administration in time range)   ondansetron (ZOFRAN-ODT) dispersible tablet 4 mg (has no administration in time range)   amoxicillin (AMOXIL) capsule 500 mg (has no administration in time range)   sodium chloride 0.9 % bolus 500 mL (0 mL Intravenous Stopped 9/15/24 2256)   morphine injection 2 mg (2 mg Intravenous Given 9/15/24 2106)   iohexol (OMNIPAQUE) 350 MG/ML injection (MULTI-DOSE) 110 mL (110 mL Intravenous Given 9/15/24 2058)   ceftriaxone (ROCEPHIN) 1 g/50 mL in dextrose IVPB (1,000 mg Intravenous New Bag 9/15/24 2318)       History of Present Illness       Chief Complaint   Patient presents with    Toe Pain     Pt states that she has been having pain to right big toe, states that it is discolored and that the tip is black. States that she went to urgent care today and was advised to come to ED for further eval.       An 83-year-old female with past medical history of diabetes, remote DVT (no longer on anticoagulants), uretero-iliac fistula s/p stent grafting, s/p colostomy, follicular lymphoma, metastatic cervical cancer, hypertension and hypothyroidism; presents with discoloration to her right great toe and pain extending from the right inguinal region through the foot.  Symptoms have been ongoing for the past four days, they have not worsened.  She denies inciting injury, however did walk a lot while vacationing with family.  Pt also complaining of shaking chills over the past week.  Initial episode of shaking chills was associated with low grade fever of 100.1 for which she was evaluated in an OSH (these records are not available for review, although she reports the work up was normal).  Pt has not had recurrent fevers.  Pt otherwise denies chest pain, SOB, abd pain, N/V, change in colostomy output or increased lower extremity swelling.      History provided by:  Patient and medical records  Toe Pain    Past Medical History:   Diagnosis Date     Cervical cancer (HCC) 01/16/2011    History of chemotherapy     age 70    History of DVT (deep vein thrombosis)     History of radiation therapy     age 70      Past Surgical History:   Procedure Laterality Date    BREAST EXCISIONAL BIOPSY Left 1985    benign    HYSTERECTOMY      age71    OOPHORECTOMY Bilateral     age 70      Review of Systems   Constitutional:  Positive for chills.   Musculoskeletal:  Positive for arthralgias (right great toe and RLE).   Skin:  Positive for color change (right great toe).   All other systems reviewed and are negative.      Objective     ED Triage Vitals   Temperature Pulse Blood Pressure Respirations SpO2 Patient Position - Orthostatic VS   09/15/24 1650 09/15/24 1650 09/15/24 1650 09/15/24 1650 09/15/24 1650 09/16/24 0127   98 °F (36.7 °C) 87 130/73 18 100 % Sitting      Temp Source Heart Rate Source BP Location FiO2 (%) Pain Score    09/16/24 0127 09/15/24 1650 09/16/24 0127 -- 09/15/24 2106    Temporal Monitor Right arm  6        Physical Exam  General Appearance: alert and oriented, nad, non toxic appearing  Skin:  Warm, dry, intact.  No cyanosis  HEENT: Atraumatic, normocephalic.  No eye drainage.  Normal hearing.  Moist mucous membranes.    Neck: Supple, trachea midline  Cardiac: RRR; no murmurs, rub, gallops.  Pitting edema to bilateral lower extremities, 2+ pulses  Pulmonary: lungs CTAB; no wheezes, rales, rhonchi  Gastrointestinal: abdomen soft, nontender, nondistended; no guarding or rebound tenderness; good bowel sounds, no mass or bruits  Extremities:  No deformities.  No calf tenderness, no clubbing.  Blue/purple discoloration of the right great toe, area is significantly tender to palpation.      Neuro:  no focal motor or sensory deficits, CN 2-12 grossly intact  Psych:  Normal mood and affect, normal judgement and insight      Labs Reviewed   CBC AND DIFFERENTIAL - Abnormal       Result Value    WBC 8.56      RBC 3.63 (*)     Hemoglobin 10.0 (*)     Hematocrit  32.4 (*)     MCV 89      MCH 27.5      MCHC 30.9 (*)     RDW 17.2 (*)     MPV 8.5 (*)     Platelets 296      nRBC 0      Segmented % 83 (*)     Immature Grans % 1      Lymphocytes % 8 (*)     Monocytes % 7      Eosinophils Relative 1      Basophils Relative 0      Absolute Neutrophils 7.15      Absolute Immature Grans 0.04      Absolute Lymphocytes 0.69      Absolute Monocytes 0.61      Eosinophils Absolute 0.04      Basophils Absolute 0.03     PROTIME-INR - Abnormal    Protime 15.3 (*)     INR 1.20 (*)     Narrative:     INR Therapeutic Range    Indication                                             INR Range      Atrial Fibrillation                                               2.0-3.0  Hypercoagulable State                                    2.0.2.3  Left Ventricular Asist Device                            2.0-3.0  Mechanical Heart Valve                                  -    Aortic(with afib, MI, embolism, HF, LA enlargement,    and/or coagulopathy)                                     2.0-3.0 (2.5-3.5)     Mitral                                                             2.5-3.5  Prosthetic/Bioprosthetic Heart Valve               2.0-3.0  Venous thromboembolism (VTE: VT, PE        2.0-3.0   BASIC METABOLIC PANEL - Abnormal    Sodium 134 (*)     Potassium 3.9      Chloride 98      CO2 29      ANION GAP 7      BUN 21      Creatinine 0.81      Glucose 105      Calcium 9.3      eGFR 66      Narrative:     National Kidney Disease Foundation guidelines for Chronic Kidney Disease (CKD):     Stage 1 with normal or high GFR (GFR > 90 mL/min/1.73 square meters)    Stage 2 Mild CKD (GFR = 60-89 mL/min/1.73 square meters)    Stage 3A Moderate CKD (GFR = 45-59 mL/min/1.73 square meters)    Stage 3B Moderate CKD (GFR = 30-44 mL/min/1.73 square meters)    Stage 4 Severe CKD (GFR = 15-29 mL/min/1.73 square meters)    Stage 5 End Stage CKD (GFR <15 mL/min/1.73 square meters)  Note: GFR calculation is accurate only with a steady  state creatinine   APTT - Normal    PTT 31       CTA abdominal w run off w wo contrast   ED Interpretation by Aminata Mejia DO (09/15 2231)   VRAD:  1.) Although there is occlusion versus congenital absence of the native proximal right BRONSON, the distal right BRONSON appears to have good collateral flow and good distal coverage of the ankle and foot.   2.) There is reasonably good flow of the left infrapopliteal arteries.  3.) Right external iliac artery stent is noted and appears patent.  4.) No other large vessel stenosis or occlusion.  5.) Severe hepatic steatosis.  6.) There is air in the bladder. Correlate clinically for iatrogenic cause versus infection or fistula.  7.) Right hip adductor intramuscular rim enhancing fluid collection. Correlate clinically for abscess versus other chronic process.      CT head without contrast   Final Interpretation by Jose Cruz Erickson DO (09/15 2146)      No acute intracranial abnormality.  Chronic microangiopathic changes.                  Workstation performed: [id number]             Procedures  ECG 12 Lead Documentation  Date/Time: today/date: 9/16/2024  Performed by: Aminata Mejia    ECG reviewed by me, the ED Provider: yes    Patient location:  ED   Previous ECG:  Compared to current, no change   Rate:  70  ECG rate assessment: normal    Rhythm: sinus rhythm    Ectopy:  none    QRS axis:  Normal  Intervals: bifascicular block  Q waves: None   ST segments:  nonspecific changes  T waves: normal      Impression: NSR with bifascicular block, nonspecific ST changes      Aminata Mejia DO  09/16/24 0155

## 2024-09-16 PROBLEM — R93.89 ABNORMAL CT SCAN: Status: ACTIVE | Noted: 2024-09-16

## 2024-09-16 LAB
ALBUMIN SERPL BCG-MCNC: 2.9 G/DL (ref 3.5–5)
ALP SERPL-CCNC: 209 U/L (ref 34–104)
ALT SERPL W P-5'-P-CCNC: 27 U/L (ref 7–52)
ANION GAP SERPL CALCULATED.3IONS-SCNC: 6 MMOL/L (ref 4–13)
APTT PPP: 18 SECONDS (ref 23–34)
AST SERPL W P-5'-P-CCNC: 29 U/L (ref 13–39)
ATRIAL RATE: 70 BPM
B PARAP IS1001 DNA NPH QL NAA+NON-PROBE: NOT DETECTED
B PERT.PT PRMT NPH QL NAA+NON-PROBE: NOT DETECTED
BASOPHILS # BLD AUTO: 0.02 THOUSANDS/ΜL (ref 0–0.1)
BASOPHILS NFR BLD AUTO: 0 % (ref 0–1)
BILIRUB SERPL-MCNC: 0.27 MG/DL (ref 0.2–1)
BUN SERPL-MCNC: 16 MG/DL (ref 5–25)
C PNEUM DNA NPH QL NAA+NON-PROBE: NOT DETECTED
CALCIUM ALBUM COR SERPL-MCNC: 9.8 MG/DL (ref 8.3–10.1)
CALCIUM SERPL-MCNC: 8.9 MG/DL (ref 8.4–10.2)
CHLORIDE SERPL-SCNC: 102 MMOL/L (ref 96–108)
CO2 SERPL-SCNC: 28 MMOL/L (ref 21–32)
CREAT SERPL-MCNC: 0.67 MG/DL (ref 0.6–1.3)
EOSINOPHIL # BLD AUTO: 0.06 THOUSAND/ΜL (ref 0–0.61)
EOSINOPHIL NFR BLD AUTO: 1 % (ref 0–6)
ERYTHROCYTE [DISTWIDTH] IN BLOOD BY AUTOMATED COUNT: 17.3 % (ref 11.6–15.1)
FLUAV RNA NPH QL NAA+NON-PROBE: NOT DETECTED
FLUBV RNA NPH QL NAA+NON-PROBE: NOT DETECTED
GFR SERPL CREATININE-BSD FRML MDRD: 80 ML/MIN/1.73SQ M
GLUCOSE SERPL-MCNC: 118 MG/DL (ref 65–140)
HADV DNA NPH QL NAA+NON-PROBE: NOT DETECTED
HCOV 229E RNA NPH QL NAA+NON-PROBE: NOT DETECTED
HCOV HKU1 RNA NPH QL NAA+NON-PROBE: NOT DETECTED
HCOV NL63 RNA NPH QL NAA+NON-PROBE: NOT DETECTED
HCOV OC43 RNA NPH QL NAA+NON-PROBE: NOT DETECTED
HCT VFR BLD AUTO: 27.5 % (ref 34.8–46.1)
HGB BLD-MCNC: 8.7 G/DL (ref 11.5–15.4)
HMPV RNA NPH QL NAA+NON-PROBE: NOT DETECTED
HPIV1 RNA NPH QL NAA+NON-PROBE: NOT DETECTED
HPIV2 RNA NPH QL NAA+NON-PROBE: NOT DETECTED
HPIV3 RNA NPH QL NAA+NON-PROBE: NOT DETECTED
HPIV4 RNA NPH QL NAA+NON-PROBE: NOT DETECTED
IMM GRANULOCYTES # BLD AUTO: 0.03 THOUSAND/UL (ref 0–0.2)
IMM GRANULOCYTES NFR BLD AUTO: 0 % (ref 0–2)
INR PPP: 1.07 (ref 0.85–1.19)
LYMPHOCYTES # BLD AUTO: 0.74 THOUSANDS/ΜL (ref 0.6–4.47)
LYMPHOCYTES NFR BLD AUTO: 10 % (ref 14–44)
M PNEUMO DNA NPH QL NAA+NON-PROBE: NOT DETECTED
MCH RBC QN AUTO: 27.7 PG (ref 26.8–34.3)
MCHC RBC AUTO-ENTMCNC: 31.6 G/DL (ref 31.4–37.4)
MCV RBC AUTO: 88 FL (ref 82–98)
MONOCYTES # BLD AUTO: 0.58 THOUSAND/ΜL (ref 0.17–1.22)
MONOCYTES NFR BLD AUTO: 8 % (ref 4–12)
NEUTROPHILS # BLD AUTO: 5.96 THOUSANDS/ΜL (ref 1.85–7.62)
NEUTS SEG NFR BLD AUTO: 81 % (ref 43–75)
NRBC BLD AUTO-RTO: 0 /100 WBCS
P AXIS: 59 DEGREES
PLATELET # BLD AUTO: 313 THOUSANDS/UL (ref 149–390)
PMV BLD AUTO: 9 FL (ref 8.9–12.7)
POTASSIUM SERPL-SCNC: 3.4 MMOL/L (ref 3.5–5.3)
PR INTERVAL: 156 MS
PROCALCITONIN SERPL-MCNC: 1.24 NG/ML
PROT SERPL-MCNC: 6.4 G/DL (ref 6.4–8.4)
PROTHROMBIN TIME: 14.1 SECONDS (ref 12.3–15)
QRS AXIS: -55 DEGREES
QRSD INTERVAL: 136 MS
QT INTERVAL: 422 MS
QTC INTERVAL: 455 MS
RBC # BLD AUTO: 3.14 MILLION/UL (ref 3.81–5.12)
RSV RNA NPH QL NAA+NON-PROBE: NOT DETECTED
RV+EV RNA NPH QL NAA+NON-PROBE: NOT DETECTED
SARS-COV-2 RNA NPH QL NAA+NON-PROBE: NOT DETECTED
SODIUM SERPL-SCNC: 136 MMOL/L (ref 135–147)
T WAVE AXIS: 19 DEGREES
VENTRICULAR RATE: 70 BPM
WBC # BLD AUTO: 7.39 THOUSAND/UL (ref 4.31–10.16)

## 2024-09-16 PROCEDURE — 99233 SBSQ HOSP IP/OBS HIGH 50: CPT | Performed by: INTERNAL MEDICINE

## 2024-09-16 PROCEDURE — 0202U NFCT DS 22 TRGT SARS-COV-2: CPT | Performed by: INTERNAL MEDICINE

## 2024-09-16 PROCEDURE — 99223 1ST HOSP IP/OBS HIGH 75: CPT | Performed by: INTERNAL MEDICINE

## 2024-09-16 PROCEDURE — 85025 COMPLETE CBC W/AUTO DIFF WBC: CPT | Performed by: NURSE PRACTITIONER

## 2024-09-16 PROCEDURE — 99222 1ST HOSP IP/OBS MODERATE 55: CPT | Performed by: INTERNAL MEDICINE

## 2024-09-16 PROCEDURE — 93010 ELECTROCARDIOGRAM REPORT: CPT | Performed by: INTERNAL MEDICINE

## 2024-09-16 PROCEDURE — 85730 THROMBOPLASTIN TIME PARTIAL: CPT | Performed by: INTERNAL MEDICINE

## 2024-09-16 PROCEDURE — 97167 OT EVAL HIGH COMPLEX 60 MIN: CPT

## 2024-09-16 PROCEDURE — 87040 BLOOD CULTURE FOR BACTERIA: CPT | Performed by: INTERNAL MEDICINE

## 2024-09-16 PROCEDURE — 85610 PROTHROMBIN TIME: CPT | Performed by: INTERNAL MEDICINE

## 2024-09-16 PROCEDURE — 97163 PT EVAL HIGH COMPLEX 45 MIN: CPT

## 2024-09-16 PROCEDURE — 80053 COMPREHEN METABOLIC PANEL: CPT | Performed by: NURSE PRACTITIONER

## 2024-09-16 PROCEDURE — 84145 PROCALCITONIN (PCT): CPT | Performed by: INTERNAL MEDICINE

## 2024-09-16 RX ORDER — ACETAMINOPHEN 325 MG/1
975 TABLET ORAL EVERY 6 HOURS PRN
Status: DISCONTINUED | OUTPATIENT
Start: 2024-09-16 | End: 2024-09-16

## 2024-09-16 RX ORDER — DIAZEPAM 5 MG
2.5 TABLET ORAL EVERY 6 HOURS PRN
Status: DISCONTINUED | OUTPATIENT
Start: 2024-09-16 | End: 2024-09-21 | Stop reason: HOSPADM

## 2024-09-16 RX ORDER — ACETAMINOPHEN 325 MG/1
975 TABLET ORAL EVERY 6 HOURS PRN
Status: DISCONTINUED | OUTPATIENT
Start: 2024-09-16 | End: 2024-09-21 | Stop reason: HOSPADM

## 2024-09-16 RX ORDER — DICYCLOMINE HYDROCHLORIDE 10 MG/1
10 CAPSULE ORAL 3 TIMES DAILY PRN
Status: DISCONTINUED | OUTPATIENT
Start: 2024-09-16 | End: 2024-09-21 | Stop reason: HOSPADM

## 2024-09-16 RX ORDER — OXYCODONE HYDROCHLORIDE 5 MG/1
5 TABLET ORAL 4 TIMES DAILY PRN
Status: DISCONTINUED | OUTPATIENT
Start: 2024-09-16 | End: 2024-09-16

## 2024-09-16 RX ORDER — MAGNESIUM HYDROXIDE/ALUMINUM HYDROXICE/SIMETHICONE 120; 1200; 1200 MG/30ML; MG/30ML; MG/30ML
30 SUSPENSION ORAL EVERY 6 HOURS PRN
Status: DISCONTINUED | OUTPATIENT
Start: 2024-09-16 | End: 2024-09-21 | Stop reason: HOSPADM

## 2024-09-16 RX ORDER — AMOXICILLIN 500 MG/1
500 CAPSULE ORAL EVERY 12 HOURS SCHEDULED
COMMUNITY

## 2024-09-16 RX ORDER — MORPHINE SULFATE 15 MG/1
15 TABLET ORAL EVERY 6 HOURS PRN
Status: DISCONTINUED | OUTPATIENT
Start: 2024-09-16 | End: 2024-09-21 | Stop reason: HOSPADM

## 2024-09-16 RX ORDER — HYDROMORPHONE HCL IN WATER/PF 6 MG/30 ML
0.2 PATIENT CONTROLLED ANALGESIA SYRINGE INTRAVENOUS EVERY 4 HOURS PRN
Status: DISCONTINUED | OUTPATIENT
Start: 2024-09-16 | End: 2024-09-21 | Stop reason: HOSPADM

## 2024-09-16 RX ORDER — ONDANSETRON 4 MG/1
4 TABLET, ORALLY DISINTEGRATING ORAL EVERY 6 HOURS PRN
Status: DISCONTINUED | OUTPATIENT
Start: 2024-09-16 | End: 2024-09-21 | Stop reason: HOSPADM

## 2024-09-16 RX ORDER — LISINOPRIL 5 MG/1
2.5 TABLET ORAL DAILY
Status: DISCONTINUED | OUTPATIENT
Start: 2024-09-16 | End: 2024-09-21 | Stop reason: HOSPADM

## 2024-09-16 RX ORDER — SACCHAROMYCES BOULARDII 250 MG
250 CAPSULE ORAL 2 TIMES DAILY
Status: DISCONTINUED | OUTPATIENT
Start: 2024-09-16 | End: 2024-09-21 | Stop reason: HOSPADM

## 2024-09-16 RX ORDER — DOCUSATE SODIUM 100 MG/1
100 CAPSULE, LIQUID FILLED ORAL 2 TIMES DAILY
Status: DISCONTINUED | OUTPATIENT
Start: 2024-09-16 | End: 2024-09-21 | Stop reason: HOSPADM

## 2024-09-16 RX ORDER — LEVOTHYROXINE SODIUM 50 UG/1
50 TABLET ORAL
Status: DISCONTINUED | OUTPATIENT
Start: 2024-09-16 | End: 2024-09-21 | Stop reason: HOSPADM

## 2024-09-16 RX ORDER — ASCORBIC ACID 500 MG
500 TABLET ORAL DAILY
Status: DISCONTINUED | OUTPATIENT
Start: 2024-09-16 | End: 2024-09-21 | Stop reason: HOSPADM

## 2024-09-16 RX ORDER — AMOXICILLIN 500 MG/1
500 CAPSULE ORAL EVERY 12 HOURS SCHEDULED
Status: DISCONTINUED | OUTPATIENT
Start: 2024-09-16 | End: 2024-09-21 | Stop reason: HOSPADM

## 2024-09-16 RX ADMIN — B-COMPLEX W/ C & FOLIC ACID TAB 1 TABLET: TAB at 08:35

## 2024-09-16 RX ADMIN — AMOXICILLIN 500 MG: 500 CAPSULE ORAL at 21:01

## 2024-09-16 RX ADMIN — LISINOPRIL 2.5 MG: 5 TABLET ORAL at 08:35

## 2024-09-16 RX ADMIN — Medication 1000 UNITS: at 08:35

## 2024-09-16 RX ADMIN — DOCUSATE SODIUM 100 MG: 100 CAPSULE, LIQUID FILLED ORAL at 01:54

## 2024-09-16 RX ADMIN — MORPHINE SULFATE 15 MG: 15 TABLET ORAL at 20:59

## 2024-09-16 RX ADMIN — Medication 250 MG: at 17:32

## 2024-09-16 RX ADMIN — DOCUSATE SODIUM 100 MG: 100 CAPSULE, LIQUID FILLED ORAL at 17:32

## 2024-09-16 RX ADMIN — AMOXICILLIN 500 MG: 500 CAPSULE ORAL at 08:37

## 2024-09-16 RX ADMIN — Medication 250 MG: at 08:35

## 2024-09-16 RX ADMIN — LEVOTHYROXINE SODIUM 50 MCG: 50 TABLET ORAL at 06:04

## 2024-09-16 RX ADMIN — OXYCODONE HYDROCHLORIDE AND ACETAMINOPHEN 500 MG: 500 TABLET ORAL at 08:35

## 2024-09-16 RX ADMIN — MORPHINE SULFATE 15 MG: 15 TABLET ORAL at 02:31

## 2024-09-16 NOTE — CONSULTS
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 Mcdowell. Been off of ASA of late    Assessment and Recommendations:   Hx MAXINE: noted, normocytic anemia on this admission with Hgb 8.6, MCV 89  Consider reassessing ferritin and iron panel  Her last IV iron was a month ago (2 sessions) and 2 months ago (2 sessions as well)  RLE ischemia: appreciate Vascular recs, thromboembolic recs in process  Consider heparin gtt as trial to assess tolerance of anti-coagulation with the recent bleeding to determine if this will recur. May need repeat IV iron and would look to assess iron labs as per above please  Pt prefers Coumadin at d/c for longer term OAC which is reasonable  She will f/u with Dr. Mcdowell of [facility]    Ema Dennison MD  Hematology Staff    Above communicated with the patient and primary service and the pt is in agreement       Thank you very much for your consultation and making us part of this nice patient's care. I will continue to follow closely with you. Please contact me with any additional questions.    Disclaimer: This document was prepared using [employer] Direct technology. If a word or phrase is confusing, or  does not make sense, this is likely due to recognition error which was not discovered during the providers review. If you believe an error has occurred, please contact me through [employer] service line for brianna?cation.    HPI:   Patsy Dallas is a 83 y.o. female admitted by Renea Flores DO with worsening discoloration and pain of the R large toe, turning blue. She has had bleeding in the past on Coumadin    Review of system:  12-point review of system was performed, pertinent positive and negative were detailed as above    Past Medical History:   Diagnosis Date    Cervical cancer (HCC) 01/16/2011    History of chemotherapy     age 70    History of DVT (deep vein thrombosis)     History of radiation therapy     age 70       Past Surgical History:   Procedure Laterality Date    BREAST EXCISIONAL BIOPSY Left 1985    benign    HYSTERECTOMY      age71    OOPHORECTOMY Bilateral     age 70       Family History   Problem Relation Age of Onset    No Known Problems Mother     No Known Problems Father     No Known Problems Daughter     No Known Problems Maternal Grandmother     No Known Problems Maternal Grandfather     No Known Problems Paternal Grandmother     No Known Problems Paternal Grandfather        Social History     Socioeconomic History    Marital status: /Civil Union     Spouse name: Not on file    Number of children: Not on file    Years of education: Not on file    Highest education level: Not on file   Occupational History    Not on file   Tobacco Use    Smoking status: Never    Smokeless tobacco: Never   Vaping Use    Vaping status: Not on file   Substance and Sexual Activity    Alcohol use: No    Drug use: No    Sexual activity: Not on file   Other Topics Concern    Not on file   Social History Narrative    Not on file     Social Determinants of Health     Financial Resource Strain: Low Risk  (7/25/2024)    Received from [facility]    Overall Financial Resource Strain (CARDIA)      Difficulty of Paying Living Expenses: Not hard at all   Food Insecurity: No Food Insecurity (7/25/2024)    Received from [facility]    Hunger Vital Sign     Worried About Running Out of Food in the Last Year: Never true     Ran Out of Food in the Last Year: Never true   Transportation Needs: No Transportation Needs (7/25/2024)    Received from [facility]    PRAPARE - Transportation     Lack of Transportation (Medical): No     Lack of Transportation (Non-Medical): No   Physical Activity: Not on file   Stress: Not on file   Social Connections: Unknown (2/28/2024)    Received from [facility] [employer] [facility]     Social Network: Not on file   Intimate Partner Violence: Not At Risk (7/25/2024)    Received from [facility]    Humiliation, Afraid, Rape, and Kick questionnaire     Fear of Current or Ex-Partner: No     Emotionally Abused: No     Physically Abused: No     Sexually Abused: No   Housing Stability: Unknown (7/25/2024)    Received from [facility]    Housing Stability Vital Sign     Unable to Pay for Housing in the Last Year: Patient declined     Number of Times Moved in the Last Year: 0     Homeless in the Last Year: No       Allergies   Allergen Reactions    Iodinated Contrast Media Diarrhea     Severe! PO oral contrast    Severe    Iron Sucrose Anaphylaxis, Other (See Comments) and Shortness Of Breath     Specifically venafor brand; labored breathing, chest discomfort, weakness, facial flush. Therahem is ok    Specifically Venofer brand; labored breathing, chest discomfort, weakness, facial flush.     Therahem is ok    Bee Pollen Allergic Rhinitis    Ibuprofen GI Bleeding    Misc. Sulfonamide Containing Compounds Hives    Wound Dressing Adhesive Other (See Comments)     Blisters    Medical Tape Itching, Rash and Other (See Comments)    Other Itching, Other (See Comments), Diarrhea and Hives     BAND AIDES RASH    Blisters    Oral CT  contrast; tolerates IV contrast    Oxybutynin Rash and Other (See Comments)    Pollen Extract Allergic Rhinitis and Other (See Comments)    Sulfa Antibiotics Rash and Other (See Comments)    Sulfur Rash    Wound Dressings Rash     BAND AIDS RASH AND BLISTERS       Current Facility-Administered Medications   Medication Dose Route Frequency Provider Last Rate Last Admin    acetaminophen (TYLENOL) tablet 975 mg  975 mg Oral Q6H PRN ARNOLD Lama        aluminum-magnesium hydroxide-simethicone (MAALOX) oral suspension 30 mL  30 mL Oral Q6H PRN ARNOLD Lama        amoxicillin (AMOXIL) capsule 500 mg  500 mg Oral Q12H [facility] ARNOLD Lama   500 mg at 09/17/24 0930    ascorbic acid (VITAMIN C) tablet 500 mg  500 mg Oral Daily ARNOLD Lama   500 mg at 09/17/24 0931    Cholecalciferol (VITAMIN D3) tablet 1,000 Units  1,000 Units Oral Daily ARNOLD Lama   1,000 Units at 09/17/24 0931    diazepam (VALIUM) tablet 2.5 mg  2.5 mg Oral Q6H PRN ARNOLD Lama        dicyclomine (BENTYL) capsule 10 mg  10 mg Oral TID PRN ARNOLD Lama        docusate sodium (COLACE) capsule 100 mg  100 mg Oral BID ARNOLD Lama   100 mg at 09/17/24 0931    HYDROmorphone HCl (DILAUDID) injection 0.2 mg  0.2 mg Intravenous Q4H PRN ARNOLD Lama        levothyroxine tablet 50 mcg  50 mcg Oral Early Morning ARNOLD Lama   50 mcg at 09/17/24 0531    lisinopril (ZESTRIL) tablet 2.5 mg  2.5 mg Oral Daily ARNOLD Lama   2.5 mg at 09/17/24 0931    morphine (MSIR) IR tablet 15 mg  15 mg Oral Q6H PRN ARNOLD Lama   15 mg at 09/16/24 2059    multivitamin stress formula tablet 1 tablet  1 tablet Oral Daily ARNOLD Lama   1 tablet at 09/17/24 0931    ondansetron (ZOFRAN-ODT) dispersible tablet 4 mg  4 mg Oral Q6H PRN Shelby Andres, CRNP        saccharomyces boulardii (FLORASTOR) capsule 250 mg  250 mg Oral BID  Shelby Campos, ARNOLD   250 mg at 09/17/24 0931       Medications Prior to Admission   Medication Sig Dispense Refill Last Dose    amoxicillin (AMOXIL) 500 mg capsule Take 500 mg by mouth every 12 (twelve) hours       docusate sodium (COLACE) 50 mg capsule Take 1 capsule (50 mg total) by mouth 2 (two) times a day as needed for constipation 90 capsule 3     lisinopril (ZESTRIL) 2.5 mg tablet Take 2.5 mg by mouth daily  3     acetaminophen (TYLENOL) 650 mg CR tablet Take 650 mg by mouth every 8 (eight) hours as needed         ascorbic acid (VITAMIN C) 1000 MG tablet Take 1,000 mg by mouth       aspirin (ECOTRIN LOW STRENGTH) 81 mg EC tablet Take by mouth       ASPIRIN 81 PO Take 1 tablet every day by oral route.       BD PosiFlush 0.9 % SOLN        Calcium Polycarbophil (Fiber) 625 MG TABS Take by mouth daily       CALCIUM-MAGNESIUM-VITAMIN D PO Take by mouth       cefpodoxime (VANTIN) 200 mg tablet take 1 tablet by mouth 2 times a day for 7 days       Cholecalciferol (VITAMIN D3) 2000 units capsule Take 1 tablet by mouth continuous as needed         cycloSPORINE (Restasis) 0.05 % ophthalmic emulsion Administer 1 drop to both eyes every 12 (twelve) hours         DAILY MULTIPLE VITAMINS PO Take 1 tablet by mouth daily       diazepam (VALIUM) 5 mg tablet Take 2.5 mg by mouth every 6 (six) hours as needed for anxiety       dicyclomine (BENTYL) 10 mg capsule TAKE 1 CAPSULE BY MOUTH 3 TIMES A DAY AS NEEDED FOR CRAMPING       famotidine (PEPCID) 20 mg tablet Take 20 mg by mouth daily         fluticasone (FLONASE) 50 mcg/act nasal spray 2 sprays into each nostril as needed       Garlic 100 MG TABS Take by mouth       Glutathione POWD Take by mouth daily         guaiFENesin (MUCINEX) 600 mg 12 hr tablet Take 1,200 mg by mouth       METRONIDAZOLE, TOPICAL, 0.75 % LOTN metronidazole 0.75 % lotion   APPLY 1 2 TIMES DAILY TO FACE 90 DAY SUPPLY       Omega-3 Fatty Acids (FISH OIL) 1,000 mg Take 1,000 mg by mouth daily        "omeprazole (PriLOSEC) 20 mg delayed release capsule Take 20 mg by mouth       ondansetron (ZOFRAN) 4 mg tablet Take 1 tablet (4 mg total) by mouth every 8 (eight) hours as needed for nausea or vomiting 20 tablet 0     Probiotic Product (PROBIOTIC-10) CAPS Take by mouth daily       sodium chloride (OCEAN) 0.65 % nasal spray 1 spray into each nostril daily       Specialty Vitamins Products (ONE-A-DAY BONE STRENGTH PO) Take 3 capsules by mouth daily         SYNTHROID 50 MCG tablet Take 50 mcg by mouth daily         TURMERIC CURCUMIN PO Take by mouth       Ubiquinol 100 MG CAPS Take 1 capsule by mouth daily            Objective:     24 Hour Vitals Assessment:     BP-Systolic (24hrs), Av , Min:113 , Max:137   BP-Diastolic (24hrs), Av, Min:59, Max:65  BP  Min: 113/61  Max: 137/65  Temp  Av.3 °F (36.8 °C)  Min: 97.6 °F (36.4 °C)  Max: 99 °F (37.2 °C)  Pulse  Av  Min: 71  Max: 83  Resp  Av.8  Min: 16  Max: 18  SpO2  Av.8 %  Min: 94 %  Max: 100 %    PHYSICIAN EXAM:      Physical exam:  General:  Appears in no distress, lying down having an ultrasound exam  Neuro:  Speaks in full sentences, no focal deficits noted  Pulmonary:  No accessory muscle use or O2 noted  Cardiovascular: Regular rate, +S1/S2  GI:  Appears nondistended, no masses noted  Extremities:  No new rash noted, no cyanosis  Psychiatry:  Normal mood with congruent affect  Ear nose and throat:  Atraumatic, extraocular muscles intact        Data Review:    Image Study:  Noted    LABS:  CBC:  Recent Labs     09/15/24  1755 09/16/24  0434 24  0448   WBC 8.56 7.39 6.51   MCV 89 88 89   MCH 27.5 27.7 28.0   MCHC 30.9* 31.6 31.6   RDW 17.2* 17.3* 17.3*   MPV 8.5* 9.0 9.1     CMP:   Recent Labs     09/15/24  1755 09/16/24  0434 24  0448   SODIUM 134* 136 136   BUN 21 16 15   CALCIUM 9.3 8.9 8.9   CREATININE 0.81 0.67 0.76       MISC. LABS:    No results for input(s): \"MAGNESIUM\", \"LDH\", \"URICACID\", \"PHOSPHORUS\", \"LACTICACID\" in " "the last 72 hours.  LFT:   Recent Labs     09/16/24  0434 09/17/24  0448   AST 29 24       Coags:  Invalid input(s): \"COAGPROFILE\"  Recent Labs     09/15/24  1755 09/16/24  1510   INR 1.20* 1.07   PTT 31 18*       By:  Ema Dennison MD, 9/17/2024, 9:40 AM                                  Primary Care Physician:  Harvey Monteiro Jr., MD       "

## 2024-09-16 NOTE — ASSESSMENT & PLAN NOTE
Hx of uretero-iliac fistula S/p emergent R external iliac artery stent grafting 7/25/2024 by vascular surgery at [facility]  CTA showing: Right external iliac artery stent is noted and appears patent  Follows with vascular surgery at [facility]

## 2024-09-16 NOTE — PLAN OF CARE
Problem: PAIN - ADULT  Goal: Verbalizes/displays adequate comfort level or baseline comfort level  Description: Interventions:  - Encourage patient to monitor pain and request assistance  - Assess pain using appropriate pain scale  - Administer analgesics based on type and severity of pain and evaluate response  - Implement non-pharmacological measures as appropriate and evaluate response  - Consider cultural and social influences on pain and pain management  - Notify physician/advanced practitioner if interventions unsuccessful or patient reports new pain  Outcome: Progressing     Problem: INFECTION - ADULT  Goal: Absence or prevention of progression during hospitalization  Description: INTERVENTIONS:  - Assess and monitor for signs and symptoms of infection  - Monitor lab/diagnostic results  - Monitor all insertion sites, i.e. indwelling lines, tubes, and drains  - Monitor endotracheal if appropriate and nasal secretions for changes in amount and color  - Trade appropriate cooling/warming therapies per order  - Administer medications as ordered  - Instruct and encourage patient and family to use good hand hygiene technique  - Identify and instruct in appropriate isolation precautions for identified infection/condition  Outcome: Progressing  Goal: Absence of fever/infection during neutropenic period  Description: INTERVENTIONS:  - Monitor WBC    Outcome: Progressing     Problem: SAFETY ADULT  Goal: Patient will remain free of falls  Description: INTERVENTIONS:  - Educate patient/family on patient safety including physical limitations  - Instruct patient to call for assistance with activity   - Consult OT/PT to assist with strengthening/mobility   - Keep Call bell within reach  - Keep bed low and locked with side rails adjusted as appropriate  - Keep care items and personal belongings within reach  - Initiate and maintain comfort rounds  - Make Fall Risk Sign visible to staff  - Offer Toileting every 2 Hours,  in advance of need  - Initiate/Maintain bEd alarm  - Apply yellow socks and bracelet for high fall risk patients  - Consider moving patient to room near nurses station  Outcome: Progressing  Goal: Maintain or return to baseline ADL function  Description: INTERVENTIONS:  -  Assess patient's ability to carry out ADLs; assess patient's baseline for ADL function and identify physical deficits which impact ability to perform ADLs (bathing, care of mouth/teeth, toileting, grooming, dressing, etc.)  - Assess/evaluate cause of self-care deficits   - Assess range of motion  - Assess patient's mobility; develop plan if impaired  - Assess patient's need for assistive devices and provide as appropriate  - Encourage maximum independence but intervene and supervise when necessary  - Involve family in performance of ADLs  - Assess for home care needs following discharge   - Consider OT consult to assist with ADL evaluation and planning for discharge  - Provide patient education as appropriate  Outcome: Progressing  Goal: Maintains/Returns to pre admission functional level  Description: INTERVENTIONS:  - Perform AM-PAC 6 Click Basic Mobility/ Daily Activity assessment daily.  - Set and communicate daily mobility goal to care team and patient/family/caregiver.   - Collaborate with rehabilitation services on mobility goals if consulted  - Perform Range of Motion 3 times a day.  - Reposition patient every 2 hours.  - Dangle patient 3 times a day  - Stand patient 3 times a day  - Ambulate patient 3 times a day  - Out of bed to chair 3 times a day   - Out of bed for meals 3 times a day  - Out of bed for toileting  - Record patient progress and toleration of activity level   Outcome: Progressing     Problem: DISCHARGE PLANNING  Goal: Discharge to home or other facility with appropriate resources  Description: INTERVENTIONS:  - Identify barriers to discharge w/patient and caregiver  - Arrange for needed discharge resources and  transportation as appropriate  - Identify discharge learning needs (meds, wound care, etc.)  - Arrange for interpretive services to assist at discharge as needed  - Refer to Case Management Department for coordinating discharge planning if the patient needs post-hospital services based on physician/advanced practitioner order or complex needs related to functional status, cognitive ability, or social support system  Outcome: Progressing     Problem: DISCHARGE PLANNING  Goal: Discharge to home or other facility with appropriate resources  Description: INTERVENTIONS:  - Identify barriers to discharge w/patient and caregiver  - Arrange for needed discharge resources and transportation as appropriate  - Identify discharge learning needs (meds, wound care, etc.)  - Arrange for interpretive services to assist at discharge as needed  - Refer to Case Management Department for coordinating discharge planning if the patient needs post-hospital services based on physician/advanced practitioner order or complex needs related to functional status, cognitive ability, or social support system  Outcome: Progressing

## 2024-09-16 NOTE — ASSESSMENT & PLAN NOTE
Hx of R ureteral stricture s/p stent c/b pyelonephritis and hydronephrosis  Has persistent bleeding from right nephrostomy, underwent clot evacuation and conversion of right PCN to PCNU and TURBT. 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 Follows with ID at [facility]  Recent treatment for UTI with Vantin x7 days on 9/05

## 2024-09-16 NOTE — H&P
H&P - Hospitalist   Name: Patsy Dallas 83 y.o. female I MRN: [id number]  Unit/Bed#: ED-27 I Date of Admission: 9/15/2024   Date of Service: 9/16/2024 I Hospital Day: 1     Assessment & Plan  Ischemia of toe  Presents reporting R great toe pain and discoloration. Concern for blue toe syndrome  CTA vRad:  Occlusion vs congenital absence of the native proximal right BRONSON, the distal right BRONSON appears to have good collateral flow and good distal coverage of the ankle and foot.   R EIA patent  There is reasonably good flow of the left infrapopliteal arteries.   No other large vessel stenosis or occlusion  Anticoagulation deferred given recent iliac artery bleed and persistent bleeding from nephrostomy   Neurovascular checks  Vascular surgery consult  Nephrostomy tube bleed (HCC)  Hx of R ureteral stricture s/p stent c/b pyelonephritis and hydronephrosis  Has persistent bleeding from right nephrostomy, underwent clot evacuation and conversion of right PCN to PCNU and TURBT. 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 Follows with ID at [facility]  Recent treatment for UTI with Vantin x7 days on 9/05  History of cervical cancer  Hx of recurrent metastatic squamous cervical cancer s/p hysterectomy; metastasis to lung with resection (2014) and SBRT (2018). Has been KATRIN since then. 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 Ileostomy (2012)   Benign essential hypertension  Lisinopril 2.5 mg daily  Abnormal CT  "scan  CTA (vRad) showing right hip adductor intramuscular rim enhancing fluid collection. Correlate clinically for abscess vs other chronic process.  ER provider concerned for abscess, given IV ceftriaxone  Monitor off antibiotics  F/u official radiology read    VTE Pharmacologic Prophylaxis: VTE Score: 6 High Risk (Score >/= 5) - Pharmacological DVT Prophylaxis Contraindicated. Sequential Compression Devices Ordered.  Code Status:  FC per patient  Discussion with family: Patient declined call to .     Anticipated Length of Stay: Patient will be admitted on an inpatient basis with an anticipated length of stay of greater than 2 midnights secondary to Toe ischemia concerning for  acute limb ischemia versus blue toe syndrome.    History of Present Illness   Chief Complaint: \"My foot\"    Patsy Dallas is a 83 y.o. female with a PMH as above who presents with c/o right great toe discoloration and pain.  Reports symptoms began last week during a trip to [place] and [place].  States she did a lot of walking during the trip and 5 days into her trip she noticed toe discoloration.  Reports associated pain, normally ambulates independently although now using a walker.  States around 2 months ago she was taken off ASA, fish oil and anti-inflammatory herbal remedy due to recurrent bleeding.     Review of Systems   Constitutional: Negative.    HENT: Negative.     Respiratory: Negative.     Cardiovascular: Negative.    Gastrointestinal: Negative.    Genitourinary: Negative.    Musculoskeletal:  Positive for myalgias.        RLE pain   Skin:  Positive for color change.   Neurological: Negative.    Psychiatric/Behavioral:  Negative for confusion.        I have reviewed the patient's PMH, PSH, Social History, Family History, Meds, and Allergies  Social History:  Marital Status: /Civil Union   Occupation: retired  Patient Pre-hospital Living Situation: Home with spouse  Patient Pre-hospital Level of " Mobility: walks  Patient Pre-hospital Diet Restrictions:     Objective     Vitals:   Blood Pressure: (!) 176/74 (09/15/24 2115)  Pulse: 75 (09/15/24 2115)  Temperature: 98 °F (36.7 °C) (09/15/24 1650)  Respirations: 16 (09/15/24 2115)  SpO2: 100 % (09/15/24 1650)    Physical Exam  Constitutional:       General: She is not in acute distress.     Appearance: Normal appearance. She is normal weight. She is not ill-appearing, toxic-appearing or diaphoretic.   HENT:      Head: Normocephalic and atraumatic.      Mouth/Throat:      Mouth: Mucous membranes are moist.   Eyes:      Conjunctiva/sclera: Conjunctivae normal.   Cardiovascular:      Rate and Rhythm: Normal rate and regular rhythm.      Heart sounds: Murmur heard.   Pulmonary:      Effort: Pulmonary effort is normal.      Breath sounds: Normal breath sounds.   Abdominal:      General: Bowel sounds are normal.      Palpations: Abdomen is soft.      Comments: Left ileostomy, no stool output   Genitourinary:     Comments: Right nephrostomy, clear yellow UO  Musculoskeletal:      Right lower leg: Edema present.      Left lower leg: Edema present.      Comments: 3+ B/LLE edema   Skin:     General: Skin is warm.      Capillary Refill: Palpable dorsalis pedis      Findings: Erythema present.      Comments: R great toe purple, purple toenail, surrounding erythema    Neurological:      Mental Status: She is alert and oriented to person, place, and time.   Psychiatric:         Mood and Affect: Mood normal.         Behavior: Behavior normal.         Thought Content: Thought content normal.         Judgment: Judgment normal.         Lines/Drains:  Lines/Drains/Airways       Active Status       None                        Additional Data:   Lab Results: I have reviewed the following results: CBC/BMP:   .     09/15/24  1755   WBC 8.56   HGB 10.0*   HCT 32.4*      SODIUM 134*   K 3.9   CL 98   CO2 29   BUN 21   CREATININE 0.81   GLUC 105      Results from last 7 days   Lab  Units 09/15/24  1755   WBC Thousand/uL 8.56   HEMOGLOBIN g/dL 10.0*   HEMATOCRIT % 32.4*   PLATELETS Thousands/uL 296   SEGS PCT % 83*   LYMPHO PCT % 8*   MONO PCT % 7   EOS PCT % 1     Results from last 7 days   Lab Units 09/15/24  1755   SODIUM mmol/L 134*   POTASSIUM mmol/L 3.9   CHLORIDE mmol/L 98   CO2 mmol/L 29   BUN mg/dL 21   CREATININE mg/dL 0.81   ANION GAP mmol/L 7   CALCIUM mg/dL 9.3   GLUCOSE RANDOM mg/dL 105     Results from last 7 days   Lab Units 09/15/24  1755   INR  1.20*         Lab Results   Component Value Date    HGBA1C 5.7 (H) 10/28/2021           Imaging Review: Reviewed radiology reports from this admission including: CT CTA.  Other Studies: EKG was reviewed. NSR 70    Administrative Statements   I have spent a total time of 60 minutes in caring for this patient on the day of the visit/encounter including Diagnostic results, Instructions for management, Impressions, and Communicating with other healthcare professionals .    ** Please Note: This note has been constructed using a voice recognition system. **

## 2024-09-16 NOTE — ASSESSMENT & PLAN NOTE
Presents reporting R great toe pain and discoloration. Concern for blue toe syndrome  CTA vRad:  Occlusion vs congenital absence of the native proximal right BRONSON, the distal right BRONSON appears to have good collateral flow and good distal coverage of the ankle and foot.   R EIA patent  There is reasonably good flow of the left infrapopliteal arteries.   No other large vessel stenosis or occlusion  Anticoagulation deferred given recent iliac artery bleed and persistent bleeding from nephrostomy   Neurovascular checks  Vascular surgery consult

## 2024-09-16 NOTE — ASSESSMENT & PLAN NOTE
Extensive past medical history including SBO with ileostomy, cervical cancer, nephrostomy tube bleed with a history of utero iliac fistula status post external iliac artery stenting July 2024 who presents with right big toe ischemia  Differentials includes thrombus versus infectious  Vascular surgery recommends thromboembolic workup to be completed including telemetry, echocardiogram, and CTA of chest.  CT of abdomen and pelvis reviewed as outlined.  Patient has an extensive history of bleeding.  Will have hematology evaluate for anticoagulation recommendations.  Previously on warfarin for thromboembolism

## 2024-09-16 NOTE — PHYSICAL THERAPY NOTE
PHYSICAL THERAPY EVALUATION          Patient Name: Patsy Dallas  Today's Date: 9/16/2024 09/16/24 1107   PT Last Visit   PT Visit Date 09/16/24   Note Type   Note type Evaluation   Pain Assessment   Pain Assessment Tool 0-10   Pain Score No Pain   Restrictions/Precautions   Other Precautions Multiple lines;Telemetry;Fall Risk  (R nephrostomy tube, ostomy)   Home Living   Type of Home House   Home Layout Two level;1/2 bath on main level;Bed/bath upstairs;Stairs to enter without rails   Bathroom Shower/Tub Walk-in shower   Bathroom Toilet Raised   Bathroom Equipment Shower chair   Home Equipment Walker;Stair glide;Other (Comment)  (Rollator)   Additional Comments 1+1 JOSE DE JESUS. stair glide to second fl. sleeps in adjustable bed   Prior Function   Level of [place] Independent with ADLs;Independent with functional mobility;Needs assistance with ADLs;Needs assistance with IADLS   Lives With Spouse   Receives Help From Other (Comment);Home health  (cleaning lady. aide every Tue and Fri)   IADLs Independent with medication management;Family/Friend/Other provides transportation;Family/Friend/Other provides meals  (reports eating take out meals)   Comments reports A for showers, and dressing prn. ambulates w RW v Rollator (keeps one on ea fl of home). spouse unable to assist as he is 92 yo and has dementia.   General   Additional Pertinent History pt admitted 9/15/24 for ischemia of toe. wbat per podiatry. up and oob orders. PMHx significant for CA, nephrostomy, colectomy w colostomy, depression, lymphedema   Family/Caregiver Present Yes  (dtr)   Cognition   Overall Cognitive Status WFL   Arousal/Participation Cooperative   Memory Within functional limits   Following Commands Follows one step commands without difficulty   Comments requires occ cues to redirect   Bed Mobility   Supine to Sit 5  Supervision   Additional items HOB elevated;Bedrails;Increased time required   Transfers   Sit to  Stand 5  Supervision   Additional items Other  (RW)   Toilet transfer 5  Supervision   Additional items Standard toilet   Ambulation/Elevation   Gait pattern Short stride;Excessively slow   Gait Assistance 5  Supervision   Additional items Assist x 1   Assistive Device Rolling walker   Distance 8'   Balance   Static Standing Fair   Dynamic Standing Fair -   Ambulatory Fair -   Endurance Deficit   Endurance Deficit No   Activity Tolerance   Activity Tolerance Patient tolerated treatment well   Medical Staff Made Aware Sharifa SWEET; vascular   Nurse Made Aware Ingris RN   Assessment   Prognosis Good   Problem List Decreased skin integrity   Assessment Patsy Dallas is a 83 y.o. female admitted to [facility] on 9/15/[facility]24 for Ischemia of toe. PT was consulted and pt was seen on 9/16/2024 for mobility assessment and d/c planning. Pt presents w medium fall risk, multiple lines. At baseline is indep w use of RW/Rollator. Pt is currently functioning at a S for bed mobility, transfers and ambulation w RW Pt w no pain during session. No LOB using baseline AD. Limited assessment of mobility as pt required increased time in bathroom for nsg/ personal care needs. Pt will benefit from 1-2 f/u sessions to ensure appropriate progression of mobility; can consider dc to restorative services as appropriate. The patient's AM-PAC Basic Mobility Inpatient Short Form Raw Score is 19. A Raw score of greater than 16 suggests the patient may benefit from discharge to home.   Barriers to Discharge None   Goals   Patient Goals none stated   [facility] Expiration Date 09/23/24   Short Term Goal #1 1).  Improve am-pac by 2. 2) Perform all transfers with Laura demonstrating safe and appropriate technique 100% of the time in order to improve ability to negotiate safely in home environment.3) Amb with least restrictive AD > 50'x1 with mod I in order to demonstrate ability to negotiate in home environment.4)  Improve overall strength and balance 1/2 grade in  order to optimize ability to perform functional tasks and reduce fall risk.5) Increase activity tolerance to 45 minutes in order to improve endurance to functional tasks.6)  Negotiate stairs using most appropriate technique and S in order to be able to negotiate safely in home environment.   Plan   Treatment/Interventions Functional transfer training;LE strengthening/ROM;Elevations;Therapeutic exercise;Patient/family training;Equipment eval/education;Bed mobility;Gait training;Continued evaluation;Spoke to nursing;Spoke to advanced practitioner;OT;Family   PT Frequency Other (Comment)  (1-2 f/u)   Discharge Recommendation   Rehab Resource Intensity Level, PT No post-acute rehabilitation needs   AM-PAC Basic Mobility Inpatient   Turning in Flat Bed Without Bedrails 4   Lying on Back to Sitting on Edge of Flat Bed Without Bedrails 3   Moving Bed to Chair 3   Standing Up From Chair Using Arms 3   Walk in Room 3   Climb 3-5 Stairs With Railing 3   Basic Mobility Inpatient Raw Score 19   Basic Mobility Standardized Score 42.48   [facility] Highest Level Of Mobility   -HLM Goal 6: Walk 10 steps or more   -HLM Achieved 6: Walk 10 steps or more   End of Consult   Patient Position at End of Consult Other (comment)  (in bathroom w OT)   History: co - morbidities including age, use of assistive device, current experience including fall risk, multiple lines  Exam: impairments in systems including multiple body structures involved; neuromuscular (balance, gait, transfers), cognition; activity limitations (difficulties executing an action); participation restrictions (problems associated w involvement in life situations), AM-PAC  Clinical: unstable/unpredictable  Complexity:high    Lilia Black, PT

## 2024-09-16 NOTE — ASSESSMENT & PLAN NOTE
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  She underwent stenting of the site at the end of July after acute significant bleed.  Stent appears to be open on current CTA image.  Nephrostomy draining clear.  Continue amoxicillin suppression per [facility] ID  Trend fever curve/vitals  Repeat CBC/chemistry tomorrow  Follow-up formal vascular evaluation  Recommend discussion with patient's prior vascular surgeon for image comparison  Monitor exam otherwise

## 2024-09-16 NOTE — ASSESSMENT & PLAN NOTE
Patient presents with issues of progressing pain in her leg and thigh and new ischemic changes to her right great toe.  The toe itself is tender with skin changes on exam.  There is minimal findings on the thigh including any external injuries, bruising or skin changes.  There is a nonspecific fluid collection in the medial upper right thigh with broad differential.  She otherwise had isolated episode of chills along with the ongoing pain but no recorded fever.  White count and labs otherwise unremarkable.  Suspicion remains for an acute embolic occlusion leading to the changes in her toe which could be contributing to her pain and sensation of chills.  Would also question if she has an acute on chronic DVT in the thigh leading to her pain.  Considering other causes for chills including mosquito related illness given her travel but exposures limited and also other respiratory viruses at this time of year.  Will continue on amoxicillin suppression given #3 and #4  Agree with obtaining blood cultures  Will check respiratory viral panel  Will check West Nile serology  Repeat CBC/chemistry tomorrow to monitor stability on current regimen  Would recommend obtaining venous duplex of the right leg  Follow-up CT PE to evaluate for other embolic focus  Podiatry evaluation appreciated  Follow-up formal vascular evaluation  Trend fever curve/vitals  Additional supportive cares per primary  Additional interventions pending clinical course  Eventual transition of care back to chronic providers after discharge.

## 2024-09-16 NOTE — ASSESSMENT & PLAN NOTE
CTA (vRad) showing right hip adductor intramuscular rim enhancing fluid collection. Correlate clinically for abscess vs other chronic process.  ER provider concerned for abscess, given IV ceftriaxone  Monitor off antibiotics  F/u official radiology read

## 2024-09-16 NOTE — ASSESSMENT & PLAN NOTE
Extensive history of hematuria/bleeding with ureteral iliac fistula  Follows with hematology at [facility], on IV iron infusions PRN

## 2024-09-16 NOTE — PLAN OF CARE
Problem: OCCUPATIONAL THERAPY ADULT  Goal: Performs self-care activities at highest level of function for planned discharge setting.  See evaluation for individualized goals.  Description: Treatment Interventions: ADL retraining, Functional transfer training, UE strengthening/ROM, Endurance training, Cognitive reorientation, Patient/family training, Equipment evaluation/education, Neuromuscular reeducation, Compensatory technique education, Continued evaluation, Energy conservation, Activityengagement          See flowsheet documentation for full assessment, interventions and recommendations.   Note: Limitation: Decreased ADL status, Decreased UE strength, Decreased Safe judgement during ADL, Decreased endurance, Decreased self-care trans, Decreased high-level ADLs, Mood limitation (dec balance, functional reach, coordination)  Prognosis: Good  Assessment: Patient is a 83 y.o. year old female seen for OT eval s/p admit to [facility] on 9/15/2024 with ischemia of toe, nephrostomy tube bleed, iliac artery bleed.  OT consulted to assess ADLs/IADLs/functional mobility and assist w/ D/C planning. Patient demonstrates the following deficits impacting occupational performance: decreased strength , decreased balance, decreased activity tolerance, limited functional reach, impaired sensation, impaired sequencing, impaired problem solving, decreased safety awareness, increased pain, impaired coordination, decreased cardiovascular endurance, and decreased skin integrity . These impairments, as well at pt’s [place] [facility] home environment, limited home support, difficulty performing ADLs, difficulty performing IADLs, difficulty performing transfers/mobility, limited insight into deficits, fall risk , functional decline , new use of AD for functional transfers/mobility, advanced age, and inability to perform caregiver duties , limit pt’s ability to safely engage in all baseline areas of occupation. Pt's CLOF as follows: eating/grooming:  Independent, UB ADLs: supervision , LB ADLs: Lulu, toileting: supervision , bed mobility: supervision , functional transfers: supervision , functional mobility: supervision , sitting/standing tolerance: ~4 min. Pt would benefit from continued skilled OT while in acute setting to address deficits as defined above and to maximize (I) w/ ADLs/functional mobility. Occupational performance areas to address include: grooming, bathing/shower, toilet hygiene, dressing, medication management, health maintenance, functional mobility, community mobility, cleaning, meal prep, and household maintenance. Based on the aforementioned evaluation, functional performance deficits, and assessments, pt has been identified as a high complexity evaluation. At this time, recommendation for pt to receive post-acute rehabilitation services at a Level III (minimum resource intensity) due to above deficits and CLOF. OT will continue to follow pt 2-4x/wk to address the goals listed below to  w/in 10-14 days.     Rehab Resource Intensity Level, OT: III (Minimum Resource Intensity)

## 2024-09-16 NOTE — ASSESSMENT & PLAN NOTE
Hx of recurrent metastatic squamous cervical cancer s/p hysterectomy; metastasis to lung with resection (2014) and SBRT (2018). Has been KATRIN since then. She has had annual CT scans without evidence of disease

## 2024-09-16 NOTE — ASSESSMENT & PLAN NOTE
Hx of R ureteral stricture s/p stent c/b pyelonephritis and hydronephrosis  Has persistent bleeding from right nephrostomy, underwent clot evacuation and conversion of right PCN to PCNU and TURBT. 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 Follows with ID at [facility]  Recent treatment for UTI with Vantin x7 days on 9/05   Ear Star Wedge Flap Text: The defect edges were debeveled with a #15 blade scalpel.  Given the location of the defect and the proximity to free margins (helical rim) an ear star wedge flap was deemed most appropriate.  Using a sterile surgical marker, the appropriate flap was drawn incorporating the defect and placing the expected incisions between the helical rim and antihelix where possible.  The area thus outlined was incised through and through with a #15 scalpel blade.

## 2024-09-16 NOTE — ASSESSMENT & PLAN NOTE
History of recurrent metastatic squamous cervical cancer s/p hysterectomy; metastasis to lung with resection (2014) and SBRT (2018).

## 2024-09-16 NOTE — ASSESSMENT & PLAN NOTE
Hx of uretero-iliac fistula S/p emergent R external iliac artery stent grafting 7/25/2024 by vascular surgery at [facility]  CTA showing: Right external iliac artery stent is noted and appears patent  Follows with vascular surgery at [facility]

## 2024-09-16 NOTE — ASSESSMENT & PLAN NOTE
Patient with chronic hematuria from her nephrostomy and was found to have #3.  Currently with clear output.  Will monitor for now.  Continue suppressive antibiotic as above.

## 2024-09-16 NOTE — CONSULTS
Podiatry - Consultation    Patient Information:   Patsy Dallas 83 y.o. female MRN: [id number]  Unit/Bed#: E2 -01 Encounter: [id number]  PCP: Harvey Monteiro Jr., MD  Date of Admission:  9/15/2024  Date of Consultation: 09/16/24  Requesting Physician: Fernando Pacheco DO      ASSESSMENT:    Patsy Dallas is a 83 y.o. female with:    Right hallux ischemic changes  Thrombotic event of unknown origin  Benign essential hypertension  Hypothyroidism  History of cervical cancer  History of DVT  Nodule of left lung  Peripheral neuropathy secondary to chemotherapy    PLAN:    Patient seen and evaluated at bedside today. Right hallux ischemic changes with no open wound or acute clinical sign of infection  Review abdominal CTA, no evidence of macro embolic disease or other acute arterial process. Chest CTA planned to rule out more proximal source  Discussed patient with vascular surgery, no intervention planned. Bilateral DP and PT pulses are palpable with patent iliac stent  Patient on amoxicillin for chronic suppressive therapy due to retained fistula stent  Plan for continued monitoring while inpatient for tissue demarcation and outpatient follow up. No podiatric surgical intervention indicated at this time.  Elevation on green foam wedges or pillows when non-ambulatory.  Rest of care per primary team.      Weightbearing status: Weightbearing as tolerated to right lower extremity    SUBJECTIVE:    History of Present Illness:    Patsy Dallas is a 83 y.o. female who is originally admitted 9/15/2024 due to ischemia of the right great toe. Patient has a past medical history of cervical cancer, hypothyroidism, benign essential hypertension, history of DVT, and nodule of left lung secondary to cervical cancer metastasis.    We are consulted for ischemia of the right great toe.  Patient reports that she first noticed any changes about a week ago while she was in [place] with her daughter.  Patient states that she  went on a long walk for her and had to use her right foot to get herself into her family's van.  Patient reported that on Saturday she experienced a sudden sharp pain to the right foot and the pain never subsided which brought her to report to the emergency department.    Review of Systems:    Constitutional: Negative.    HENT: Negative.    Eyes: Negative.    Respiratory: Negative.    Cardiovascular: Negative.    Gastrointestinal: Negative.    Musculoskeletal: Negative   Skin:Ischemic changes to right great toe   Neurological: Numbness and tingling  Psych: Negative.     Past Medical and Surgical History:     Past Medical History:   Diagnosis Date    Cervical cancer (HCC) 01/16/2011    History of chemotherapy     age 70    History of DVT (deep vein thrombosis)     History of radiation therapy     age 70       Past Surgical History:   Procedure Laterality Date    BREAST EXCISIONAL BIOPSY Left 1985    benign    HYSTERECTOMY      age71    OOPHORECTOMY Bilateral     age 70       Meds/Allergies:      Medications Prior to Admission:     amoxicillin (AMOXIL) 500 mg capsule    docusate sodium (COLACE) 50 mg capsule    lisinopril (ZESTRIL) 2.5 mg tablet    acetaminophen (TYLENOL) 650 mg CR tablet    ascorbic acid (VITAMIN C) 1000 MG tablet    aspirin (ECOTRIN LOW STRENGTH) 81 mg EC tablet    ASPIRIN 81 PO    BD PosiFlush 0.9 % SOLN    Calcium Polycarbophil (Fiber) 625 MG TABS    CALCIUM-MAGNESIUM-VITAMIN D PO    cefpodoxime (VANTIN) 200 mg tablet    Cholecalciferol (VITAMIN D3) 2000 units capsule    cycloSPORINE (Restasis) 0.05 % ophthalmic emulsion    DAILY MULTIPLE VITAMINS PO    diazepam (VALIUM) 5 mg tablet    dicyclomine (BENTYL) 10 mg capsule    famotidine (PEPCID) 20 mg tablet    fluticasone (FLONASE) 50 mcg/act nasal spray    Garlic 100 MG TABS    Glutathione POWD    guaiFENesin (MUCINEX) 600 mg 12 hr tablet    METRONIDAZOLE, TOPICAL, 0.75 % LOTN    Omega-3 Fatty Acids (FISH OIL) 1,000 mg    omeprazole (PriLOSEC) 20  mg delayed release capsule    ondansetron (ZOFRAN) 4 mg tablet    Probiotic Product (PROBIOTIC-10) CAPS    sodium chloride (OCEAN) 0.65 % nasal spray    Specialty Vitamins Products (ONE-A-DAY BONE STRENGTH PO)    SYNTHROID 50 MCG tablet    TURMERIC CURCUMIN PO    Ubiquinol 100 MG CAPS    Allergies   Allergen Reactions    Iodinated Contrast Media Diarrhea     Severe! PO oral contrast    Severe    Iron Sucrose Anaphylaxis, Other (See Comments) and Shortness Of Breath     Specifically venafor brand; labored breathing, chest discomfort, weakness, facial flush. Therahem is ok    Specifically Venofer brand; labored breathing, chest discomfort, weakness, facial flush.     Therahem is ok    Bee Pollen Allergic Rhinitis    Ibuprofen GI Bleeding    Misc. Sulfonamide Containing Compounds Hives    Wound Dressing Adhesive Other (See Comments)     Blisters    Medical Tape Itching, Rash and Other (See Comments)    Other Itching, Other (See Comments), Diarrhea and Hives     BAND AIDES RASH    Blisters    Oral CT contrast; tolerates IV contrast    Oxybutynin Rash and Other (See Comments)    Pollen Extract Allergic Rhinitis and Other (See Comments)    Sulfa Antibiotics Rash and Other (See Comments)    Sulfur Rash    Wound Dressings Rash     BAND AIDS RASH AND BLISTERS       Social History:     Marital Status: /Civil Union    Substance Use History:   Social History     Substance and Sexual Activity   Alcohol Use No     Social History     Tobacco Use   Smoking Status Never   Smokeless Tobacco Never     Social History     Substance and Sexual Activity   Drug Use No       Family History:    Family History   Problem Relation Age of Onset    No Known Problems Mother     No Known Problems Father     No Known Problems Daughter     No Known Problems Maternal Grandmother     No Known Problems Maternal Grandfather     No Known Problems Paternal Grandmother     No Known Problems Paternal Grandfather          OBJECTIVE:    Vitals:   Blood  Pressure: 122/77 (09/16/24 0749)  Pulse: 78 (09/16/24 0749)  Temperature: 98.8 °F (37.1 °C) (09/16/24 0749)  Temp Source: Temporal (09/16/24 0749)  Respirations: 16 (09/16/24 0749)  SpO2: 97 % (09/16/24 0749)    Physical Exam:    General Appearance: Alert, cooperative, no distress.  HEENT: Head normocephalic, atraumatic, without obvious abnormality.  Heart: Normal rate and rhythm.  Lungs: Non-labored breathing. No respiratory distress.  Abdomen: Without distension.  Psychiatric: AAOx3  Lower Extremity:  Vascular:   Right DP and PT pulses are present, PT pulse is weak secondary to lymphedema. Left DP and PT pulses are present,PT pulse is weak secondary to lymphedema. CRT < 3 seconds at the digits. +2/4 edema noted at bilateral lower extremities. Pedal hair is absent. Skin temperature is cool at the right distal lateral hallux compared to the left.    Musculoskeletal:  MMT is 4/5 in all muscle compartments bilaterally. ROM at the 1st MPJ and ankle joint are reduced bilaterally with the leg extended. Pain on palpation of right distal hallux. No gross deformities noted.     Dermatological:    Right lower extremity: ischemic changes to the distal aspect of the right hallux with no open wounds or lesions and without signs of active infection: no purulence, no malodor, no ascending erythema, no crepitus, no fluctuance.    Neurological:  Gross sensation is diminished. Protective sensation is diminished. Patient Reports numbness and/or paresthesias.    Clinical Images 09/16/24:            Additional data:     Lab Results: I have personally reviewed pertinent labs including:    Results from last 7 days   Lab Units 09/16/24  0434   WBC Thousand/uL 7.39   HEMOGLOBIN g/dL 8.7*   HEMATOCRIT % 27.5*   PLATELETS Thousands/uL 313   SEGS PCT % 81*   LYMPHO PCT % 10*   MONO PCT % 8   EOS PCT % 1     Results from last 7 days   Lab Units 09/16/24  0434   POTASSIUM mmol/L 3.4*   CHLORIDE mmol/L 102   CO2 mmol/L 28   BUN mg/dL 16  "  CREATININE mg/dL 0.67   CALCIUM mg/dL 8.9   ALK PHOS U/L 209*   ALT U/L 27   AST U/L 29     Results from last 7 days   Lab Units 09/15/24  1755   INR  1.20*       Cultures: I have personally reviewed pertinent cultures including:              Imaging: I have personally reviewed pertinent reports in PACS.  EKG, Pathology, and Other Studies: I have personally reviewed pertinent reports.        ** Please Note: Portions of the record may have been created with voice recognition software. Occasional wrong word or \"sound a like\" substitutions may have occurred due to the inherent limitations of voice recognition software. Read the chart carefully and recognize, using context, where substitutions have occurred. **    "

## 2024-09-16 NOTE — ASSESSMENT & PLAN NOTE
Patient has history of metastatic cervical cancer and she was diagnosed with metastatic lesion to the lung in 2018.  She completed localized radiation but no further chemotherapy.  She has been receiving holistic therapy since.  She was under surveillance.  Would question if this may have led to a hypercoagulable state leading to #1.  She also has background of prior non-Hodgkin's lymphoma.  Follow-up oncology evaluation  Potential restart of anticoagulation  Recommend venous duplex as above  Follow-up CT PE  Continue suppressive antibiotic  Follow-up further workup including blood cultures

## 2024-09-16 NOTE — OCCUPATIONAL THERAPY NOTE
Occupational Therapy Evaluation     Patient Name: Patsy Dallas  Today's Date: 9/16/2024  Problem List  Principal Problem:    Ischemia of toe  Active Problems:    History of cervical cancer    Benign essential hypertension    Hypothyroidism    Nephrostomy tube bleed (HCC)    Chronic anemia    Iliac artery bleed    H/O colectomy    Abnormal CT scan    Past Medical History  Past Medical History:   Diagnosis Date    Cervical cancer (HCC) 01/16/2011    History of chemotherapy     age 70    History of DVT (deep vein thrombosis)     History of radiation therapy     age 70     Past Surgical History  Past Surgical History:   Procedure Laterality Date    BREAST EXCISIONAL BIOPSY Left 1985    benign    HYSTERECTOMY      age71    OOPHORECTOMY Bilateral     age 70        09/16/24 1049   OT Last Visit   OT Visit Date 09/16/24   Note Type   Note type Evaluation   Pain Assessment   Pain Assessment Tool 0-10   Pain Score No Pain   Restrictions/Precautions   Weight Bearing Precautions Per Order No   Other Precautions Telemetry;Fall Risk;Pain   Home Living   Type of Home House   Home Layout Two level;Bed/bath upstairs;1/2 bath on main level;Stairs to enter without rails  (1+1 [place]; Reports pt is unable to utilize 1st flr bathroom)   Bathroom Shower/Tub Walk-in shower   Bathroom Toilet Raised   Home Equipment Walker;Other (Comment);Stair glide  (Rollator)   Prior Function   Level of [place] Independent with ADLs;Needs assistance with ADLs;Needs assistance with IADLS;Independent with functional mobility  (Reports HHA assists w bathing and dressing. Struggles to complete dressing without HHA but able to complete.)   Lives With Spouse  (92 y/o w/ dementia)   Receives Help From Family;Home health  (HHA Tues & Friday)   IADLs Family/Friend/Other provides transportation;Family/Friend/Other provides meals;Family/Friend/Other provides medication management   Falls in the last 6 months 0   Vocational Retired   Lifestyle   Autonomy  "PTA, required (A) with ADLs, required (A) with IADLs, and completed functional mobility/transfers with Laura utilizing RW vs rollator . Patient lives in a 2 SH w/ 1+1 [place], lives w spouse who has dementia. Bed/bath on second floor w/ stair glide. HHA assists Tues & Friday w/ bathing, dressing. Reports when her aid is not there, she has difficulty dressing but is able to manage. (-) , son and friends drive. (-) falls.   Reciprocal Relationships spouse, daughter, son   Service to Others Retired   Intrinsic Gratification Watching TV   General   Additional Pertinent History Comorbidities affecting pt’s functional performance include a significant PMH of: h/o cervical CA, HTN, hypothyroidism, h/o colectomy, lymphoma, h/o DVT, abdominal pain of multiple sites, compression fx 3rd vertebra, lymphedema, metastasis from cervical CA, gross hematuria.  Patient with active OT orders and activity orders for Up and OOB as tolerated .   Family/Caregiver Present No   Subjective   Subjective \"I manage my own ostomy and nephrostomy\" Agreeable to OT/PT co-eval.   ADL   Eating Assistance 7  Independent   Grooming Assistance 5  Supervision/Setup   UB Bathing Assistance 5  Supervision/Setup   LB Bathing Assistance 5  Supervision/Setup   UB Dressing Assistance 5  Supervision/Setup   LB Dressing Assistance 4  Minimal Assistance   Toileting Assistance  5  Supervision/Setup  (Able to manage own nephrostomy and ostomy.)   Bed Mobility   Supine to Sit 5  Supervision   Additional items HOB elevated;Bedrails;Increased time required   Transfers   Sit to Stand 5  Supervision   Additional items Other;Increased time required  (RW)   Toilet transfer 5  Supervision   Additional items Standard toilet;Increased time required;Other  (grab bar, RW)   Functional Mobility   Functional Mobility 5  Supervision   Additional Comments Ax1; excessively slow. Household distances.   Additional items Rolling walker   Balance   Static Sitting Good   Dynamic " Sitting Fair +   Static Standing Fair   Dynamic Standing Fair -   Ambulatory Fair -   Activity Tolerance   Activity Tolerance Patient tolerated treatment well   Medical Staff Made Aware Lilia PT   Nurse Made Aware Ingris COLBERT   RUE Assessment   RUE Assessment WFL   LUE Assessment   LUE Assessment WFL   Hand Function   Gross Motor Coordination Functional   Fine Motor Coordination Functional   Sensation   Light Touch No apparent deficits   Proprioception   Proprioception No apparent deficits   Vision-Basic Assessment   Current Vision Wears glasses all the time   Vision - Complex Assessment   Ocular Range of Motion Intact   Acuity Able to read clock/calendar on wall without difficulty;Able to read employee name badge without difficulty   Perception   Inattention/Neglect Appears intact   Cognition   Overall Cognitive Status WFL   Arousal/Participation Alert;Responsive;Cooperative   Attention Within functional limits   Orientation Level Oriented X4   Memory Within functional limits   Following Commands Follows one step commands without difficulty   Assessment   Limitation Decreased ADL status;Decreased UE strength;Decreased Safe judgement during ADL;Decreased endurance;Decreased self-care trans;Decreased high-level ADLs;Mood limitation  (dec balance, functional reach, coordination)   Prognosis Good   Assessment Patient is a 83 y.o. year old female seen for OT eval s/p admit to [facility] on 9/15/2024 with ischemia of toe, nephrostomy tube bleed, iliac artery bleed.  OT consulted to assess ADLs/IADLs/functional mobility and assist w/ D/C planning. Patient demonstrates the following deficits impacting occupational performance: decreased strength , decreased balance, decreased activity tolerance, limited functional reach, impaired sensation, impaired sequencing, impaired problem solving, decreased safety awareness, increased pain, impaired coordination, decreased cardiovascular endurance, and decreased skin integrity . These  impairments, as well at pt’s [place] home environment, limited home support, difficulty performing ADLs, difficulty performing IADLs, difficulty performing transfers/mobility, limited insight into deficits, fall risk , functional decline , new use of AD for functional transfers/mobility, advanced age, and inability to perform caregiver duties , limit pt’s ability to safely engage in all baseline areas of occupation. Pt's CLOF as follows: eating/grooming: Independent, UB ADLs: supervision , LB ADLs: Lulu, toileting: supervision , bed mobility: supervision , functional transfers: supervision , functional mobility: supervision , sitting/standing tolerance: ~4 min. Pt would benefit from continued skilled OT while in acute setting to address deficits as defined above and to maximize (I) w/ ADLs/functional mobility. Occupational performance areas to address include: grooming, bathing/shower, toilet hygiene, dressing, medication management, health maintenance, functional mobility, community mobility, cleaning, meal prep, and household maintenance. Based on the aforementioned evaluation, functional performance deficits, and assessments, pt has been identified as a high complexity evaluation. At this time, recommendation for pt to receive post-acute rehabilitation services at a Level III (minimum resource intensity) due to above deficits and CLOF. OT will continue to follow pt 2-4x/wk to address the goals listed below to  w/in 10-14 days.   Goals   Patient Goals none reported   LTG Time Frame 10-14   Plan   Treatment Interventions ADL retraining;Functional transfer training;UE strengthening/ROM;Endurance training;Cognitive reorientation;Patient/family training;Equipment evaluation/education;Neuromuscular reeducation;Compensatory technique education;Continued evaluation;Energy conservation;Activityengagement   Goal Expiration Date 24   OT Treatment Day 0   OT Frequency 3-5x/wk   Discharge Recommendation   Rehab  "Resource Intensity Level, OT III (Minimum Resource Intensity)   Additional Comments  While pt cleaning own ostomy, pt reports stool looks \"tarry\" and requested RN to look at it. Did notify RN Ingris. Pt requesting to remain on the toilet until RN able to assess. Left pt w/ call light in hand.   AM-PAC Daily Activity Inpatient   Lower Body Dressing 2   Bathing 3   Toileting 3   Upper Body Dressing 3   Grooming 4   Eating 4   Daily Activity Raw Score 19   Daily Activity Standardized Score (Calc for Raw Score >=11) 40.22   AM-PAC Applied Cognition Inpatient   Following a Speech/Presentation 4   Understanding Ordinary Conversation 4   Taking Medications 3   Remembering Where Things Are Placed or Put Away 3   Remembering List of 4-5 Errands 3   Taking Care of Complicated Tasks 3   Applied Cognition Raw Score 20   Applied Cognition Standardized Score 41.76     Occupational Therapy goals: In 7-14 days:     1- Patient will verbalize and demonstrate use of energy conservation/deep breathing technique and work simplification skills during functional activity with no verbal cues.   2- Patient will verbalize and demonstrate good body mechanics and joint protection techniques during ADLs/IADLs with no verbal cues   3- Pt will complete bed mobility at a Mod I level w/ G balance/safety demonstrated to decrease caregiver assistance required   4- Patient will increase OOB/ sitting tolerance to 2-4 hours per day for increased participation in self care and leisure tasks with no s/s of exertion.   5-Patient will increase standing tolerance time to 5 minutes with unilateral UE support to complete sink level ADLs@ mod I level    6- Pt will improve functional transfers to Mod I on/off all surfaces using DME as needed w/ G balance/safety   7- Patient will complete UB ADLs with Laura utilizing appropriate DME/AE PRN   8- Patient will complete LB ADLs with Laura utilizing appropriate DME/AE PRN   9- Patient will complete toileting tasks with " Laura with G hygiene/thoroughness utilizing appropriate DME/AE PRN   10- Pt will improve functional mobility during ADL/IADL/leisure tasks to Mod I using DME as needed w/ G balance/safety    11- Pt will be attentive 100% of the time during ongoing cognitive assessment w/ G participation to assist w/ safe d/c planning/recommendations   12- Pt will participate in simulated IADL management task to increase independence to Mod I w/ G safety and endurance   13- Pt will increase BUE strength by 1MM grade via AROM/AAROM/PROM exercises to increase independence in ADLs and transfers       Sharifa Ray, OTR/L

## 2024-09-16 NOTE — ASSESSMENT & PLAN NOTE
Patient with ongoing chills and rigors.  CT with right retroperitoneal fluid collection as well as medial thigh collection with enhancement  Will need to rule out infectious causes given no ischemia  Will check blood cultures and have infectious disease evaluate

## 2024-09-16 NOTE — CONSULTS
Consultation - Infectious Disease   Name: Patsy Dallas 83 y.o. female I MRN: [id number]  Unit/Bed#: E2 -01 I Date of Admission: 9/15/2024   Date of Service: 9/16/2024 I Hospital Day: 1   Inpatient consult to Infectious Diseases  Consult performed by: Za Lehman MD  Consult ordered by: Fernando Pacheco DO        Physician Requesting Evaluation: Fernando Pacheco DO   Reason for Evaluation / Principal Problem: Toe ischemia and chronic suppressive antibiotic      Assessment & Plan  Ischemia of toe  Patient presents with issues of progressing pain in her leg and thigh and new ischemic changes to her right great toe.  The toe itself is tender with skin changes on exam.  There is minimal findings on the thigh including any external injuries, bruising or skin changes.  There is a nonspecific fluid collection in the medial upper right thigh with broad differential.  She otherwise had isolated episode of chills along with the ongoing pain but no recorded fever.  White count and labs otherwise unremarkable.  Suspicion remains for an acute embolic occlusion leading to the changes in her toe which could be contributing to her pain and sensation of chills.  Would also question if she has an acute on chronic DVT in the thigh leading to her pain.  Considering other causes for chills including mosquito related illness given her travel but exposures limited and also other respiratory viruses at this time of year.  Will continue on amoxicillin suppression given #3 and #4  Agree with obtaining blood cultures  Will check respiratory viral panel  Will check West Nile serology  Repeat CBC/chemistry tomorrow to monitor stability on current regimen  Would recommend obtaining venous duplex of the right leg  Follow-up CT PE to evaluate for other embolic focus  Podiatry evaluation appreciated  Follow-up formal vascular evaluation  Trend fever curve/vitals  Additional supportive cares per primary  Additional interventions pending  clinical course  Eventual transition of care back to chronic providers after discharge.  Abnormal CT scan  Patient had CTA imaging done in the setting of #1.  She is noted to have some fluid collections in the retroperitoneal space adjacent to retroperitoneal clips.  She does not localize any symptoms to this area and PCN draining clear.  Suspect that these may be postoperative given recent extensive bleeding at end of July.  Will monitor exam for now  Continue antibiotic suppression as above  Would obtain formal CT abdomen/pelvis with oral and IV contrast for completeness  Trend fever curve/vitals  Repeat CBC/chemistry tomorrow  Additional supportive cares per primary  Recommend obtaining prior imaging for comparison and discussing with patient's prior vascular surgeon for completeness.  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  She underwent stenting of the site at the end of July after acute significant bleed.  Stent appears to be open on current CTA image.  Nephrostomy draining clear.  Continue amoxicillin suppression per [facility] ID  Trend fever curve/vitals  Repeat CBC/chemistry tomorrow  Follow-up formal vascular evaluation  Recommend discussion with patient's prior vascular surgeon for image comparison  Monitor exam otherwise    Nephrostomy tube bleed (HCC)  Patient with chronic hematuria from her nephrostomy and was found to have #3.  Currently with clear output.  Will monitor for now.  Continue suppressive antibiotic as above.  History of cervical cancer  Patient has history of metastatic cervical cancer and she was diagnosed with metastatic lesion to the lung in 2018.  She completed localized radiation but no further chemotherapy.  She has been receiving holistic therapy since.  She was under surveillance.  Would question if this may have led to a hypercoagulable state leading to #1.  She also has  background of prior non-Hodgkin's lymphoma.  Follow-up oncology evaluation  Potential restart of anticoagulation  Recommend venous duplex as above  Follow-up CT PE  Continue suppressive antibiotic  Follow-up further workup including blood cultures    Above plan discussed in detail with the patient and her family at bedside  Above plan discussed in detail with primary service attending who is aware of plans for continued suppressive therapy and following up further studies    ID consult service will continue to follow.    I have spent a total time of 80 minutes on 09/16/24 in caring for this patient including Diagnostic results, Impressions, Documenting in the medical record, Reviewing / ordering tests, medicine, procedures  , Obtaining or reviewing history  , and Communicating with other healthcare professionals .     HISTORY OF PRESENT ILLNESS:  HPI: Patsy Dallsa is a 83 y.o. year old female with a very complicated past medical and surgical history.  The patient had previous cervical cancer for which she completed radiation and chemotherapy but then was found to have a malignant lesion in the lung in 2018 for which she received radiation therapy and has been on surveillance since.  In the past she had an extensive DVT in the right lower extremity for which she was on Coumadin and would intermittently receive Lovenox with travel in the past.  She had an episode of stroke recently for which she was on aspirin alone.  She also had non-Hodgkin's lymphoma for which she received treatment after her cervical cancer.  She opted for a more homeopathic route in terms of treatment/maintenance after episodes of chemotherapy and radiation.  Due to her previous treatment she had a ureteral stent for which she was following with urology and she had chronic right-sided hydronephrosis and had multiple stent exchanges.  At 1 point she developed a dislodged stent and ended up having to transition to a right-sided percutaneous  nephrostomy tube.  It was subsequent that then she had months and episodes of increased bleeding and clots from her nephrostomy.  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  She underwent stent graft placement on 7/25 and later the previous stent within the ureter was removed and she was maintained with a chronic nephrostomy.  Due to how the procedure was performed and concern for contamination then of the stent graft she has been on antibiotic suppression with amoxicillin 500 mg every 12 hours with [facility] infectious disease.  She had been otherwise compliant with her medication.  She presents now for progressing pain in the right lower extremity particularly in the upper thigh and with an ischemic toe.  Patient has not been on any anticoagulation or antiplatelet therapy since these interventions in July.  She was following with hematology as an outpatient.  She recently went on a prolonged car trip with her family and she traveled from [place] to [place] and [place] with multiple prolonged car trips during the time.  She reported that they arrived on the fourth and then it was on 9/5 that she developed an episode of shaking chills which prompted her to go to the urgent care nearby.  She was seen there and her workup including her urine cultures and labs were pretty unremarkable.  Given her complicated history though the urgent care provider called her back and when they were in [facility] they had recommended that she go and be evaluated on the inpatient side given her symptoms.  She did not have any overt fevers but the episode of chills did occur.  She then 9/9 developed acute pain in her thigh which was shooting down her leg and when the pain continued they went to hospital locally.  They had thought that this may have been a muscular injury.  No one was aware of her ischemic toe at the time.  She said that  then when she went home that evening she took off her compression and noted the changes to her toe.  They eventually made at home last Thursday and she tried to manage her symptoms at home in terms of her pain and things were not getting better and so she opted to come into the ER for evaluation.  No documented fevers or leukocytosis currently.  Respiratory viral panel ordered.  Imaging reviewed below.  In terms of exposures.  Patient's daughter doubtful about any sort of tick exposures given where they were and walking trails that they were using.  She did mention the question about West Nile virus given they were in the [place] area.  We discussed obtaining serology at least.  We discussed obtaining baseline blood cultures.  There has been no significant blood coming out of her nephrostomy tube and she did place a single clot from below with her urine.  She has no other devices in place.  Her main localizing complaints remain the right leg.  We are consulted at this time for further assistance given the patient's symptoms of chills recently and progressing right leg discomfort along with toe ischemia for further assistance.      REVIEW OF SYSTEMS:  A complete 12 point system-based review of systems is negative other than that noted in the HPI.    PAST MEDICAL HISTORY:  Past Medical History:   Diagnosis Date    Cervical cancer (HCC) 01/16/2011    History of chemotherapy     age 70    History of DVT (deep vein thrombosis)     History of radiation therapy     age 70     Past Surgical History:   Procedure Laterality Date    BREAST EXCISIONAL BIOPSY Left 1985    benign    HYSTERECTOMY      age71    OOPHORECTOMY Bilateral     age 70       FAMILY HISTORY:  Non-contributory    SOCIAL HISTORY:  Social History   Social History     Substance and Sexual Activity   Alcohol Use No     Social History     Substance and Sexual Activity   Drug Use No     Social History     Tobacco Use   Smoking Status Never   Smokeless Tobacco  Never       ALLERGIES:  Allergies   Allergen Reactions    Iodinated Contrast Media Diarrhea     Severe! PO oral contrast    Severe    Iron Sucrose Anaphylaxis, Other (See Comments) and Shortness Of Breath     Specifically venafor brand; labored breathing, chest discomfort, weakness, facial flush. Therahem is ok    Specifically Venofer brand; labored breathing, chest discomfort, weakness, facial flush.     Therahem is ok    Bee Pollen Allergic Rhinitis    Ibuprofen GI Bleeding    Misc. Sulfonamide Containing Compounds Hives    Wound Dressing Adhesive Other (See Comments)     Blisters    Medical Tape Itching, Rash and Other (See Comments)    Other Itching, Other (See Comments), Diarrhea and Hives     BAND AIDES RASH    Blisters    Oral CT contrast; tolerates IV contrast    Oxybutynin Rash and Other (See Comments)    Pollen Extract Allergic Rhinitis and Other (See Comments)    Sulfa Antibiotics Rash and Other (See Comments)    Sulfur Rash    Wound Dressings Rash     BAND AIDS RASH AND BLISTERS       MEDICATIONS:  All current active medications have been reviewed.    PHYSICAL EXAM:  Temp:  [97 °F (36.1 °C)-98.8 °F (37.1 °C)] 98.5 °F (36.9 °C)  HR:  [75-89] 76  Resp:  [16-18] 16  BP: (122-176)/(63-77) 134/63  SpO2:  [96 %-100 %] 100 %  Temp (24hrs), Av °F (36.7 °C), Min:97 °F (36.1 °C), Max:98.8 °F (37.1 °C)  Current: Temperature: 98.5 °F (36.9 °C)    Intake/Output Summary (Last 24 hours) at 2024 1448  Last data filed at 2024 0900  Gross per 24 hour   Intake 500 ml   Output 400 ml   Net 100 ml       General Appearance:  Appearing well, nontoxic, and in no distress   Head:  Normocephalic, without obvious abnormality, atraumatic   Eyes:  Conjunctiva pink and sclera anicteric, both eyes   Nose: Nares normal, mucosa normal, no drainage   Throat: Oropharynx moist without lesions; denies having any dental pain or dental implants.  Recently had cleaning 2 weeks ago that was uneventful.   Neck: Supple, symmetrical,  no adenopathy, no tenderness/mass/nodules   Lungs:   Clear to auscultation bilaterally, respirations unlabored on room air   Chest Wall:  No tenderness or deformity   Heart:  RRR; no murmur, rub or gallop noted   Abdomen:   Soft, non-tender, non-distended, positive bowel sounds; nephrostomy tube draining clear urine   Extremities: No cyanosis, clubbing or edema; patient has changes of chronic lymphedema in the lower legs.  She has some palpable tenderness in the right upper thigh but no external changes.  She has a small bruise-like lesion on the posterior of the thigh.  The right great toe is ischemic and is tender with mild surrounding warmth.   Skin: No other rashes or lesions. No other draining wounds noted.   Lymph nodes: Cervical, supraclavicular nodes normal   Neurologic: Alert and oriented times 3, patient is able to move all of her extremities against gravity.       LABS, IMAGING, & OTHER STUDIES:  In completing this consult I have performed an extensive review of the medical records in epic including review of the notes, radiographs, and laboratory results as detailed below.     Lab Results:  I have personally reviewed pertinent labs.  Comments/Interpretations: No leukocytosis.    Results from last 7 days   Lab Units 09/16/24  0434 09/15/24  1755   WBC Thousand/uL 7.39 8.56   HEMOGLOBIN g/dL 8.7* 10.0*   PLATELETS Thousands/uL 313 296     Results from last 7 days   Lab Units 09/16/24  0434   POTASSIUM mmol/L 3.4*   CHLORIDE mmol/L 102   CO2 mmol/L 28   BUN mg/dL 16   CREATININE mg/dL 0.67   EGFR ml/min/1.73sq m 80   CALCIUM mg/dL 8.9   AST U/L 29   ALT U/L 27   ALK PHOS U/L 209*           Imaging Studies:   I have personally reviewed pertinent imaging study reports and images in PACS.  Comments/Interpretations:  CTA reviewed and there was no evidence of macro embolic disease or other arterial acute process and no significant proximal occlusive disease but there was moderate infrapopliteal disease.  No  aortoiliac or left infrainguinal occlusive disease.  No definitive culprit of lesion to account for thromboembolic disease.  She has a small 2.5 cm collection in the medial upper right thigh with some enhancement which could represent seroma, hematoma or abscess and she also has ill-defined collections in the right retroperitoneum which may be intramuscular adjacent to several clips which could be chronic and postop.  She was recommended for formal CT abdomen pelvis with oral and IV contrast    CT of the head without any acute intracranial abnormalities  CT PE pending    Other Studies:   I have personally reviewed other pertinent reports as below.  Records in CareEverywhere: Recently reviewed patient's infectious disease notes in [facility]    Nursing home/EMS Records: None    Current/Prior Cultures: Blood cultures pending.  West Nile virus serology as well as respiratory viral panel requested.

## 2024-09-16 NOTE — PLAN OF CARE
Problem: PHYSICAL THERAPY ADULT  Goal: Performs mobility at highest level of function for planned discharge setting.  See evaluation for individualized goals.  Description: Treatment/Interventions: Functional transfer training, LE strengthening/ROM, Elevations, Therapeutic exercise, Patient/family training, Equipment eval/education, Bed mobility, Gait training, Continued evaluation, Spoke to nursing, Spoke to advanced practitioner, OT, Family          See flowsheet documentation for full assessment, interventions and recommendations.  Note: Prognosis: Good  Problem List: Decreased skin integrity  Assessment: Patsy Dallas is a 83 y.o. female admitted to [facility] on 9/15/2024 for Ischemia of toe. PT was consulted and pt was seen on 9/16/2024 for mobility assessment and d/c planning. Pt presents w medium fall risk, multiple lines. At baseline is indep w use of RW/Rollator. Pt is currently functioning at a S for bed mobility, transfers and ambulation w RW Pt w no pain during session. No LOB using baseline AD. Limited assessment of mobility as pt required increased time in bathroom for nsg/ personal care needs. Pt will benefit from 1-2 f/u sessions to ensure appropriate progression of mobility; can consider dc to restorative services as appropriate. The patient's AM-PAC Basic Mobility Inpatient Short Form Raw Score is 19. A Raw score of greater than 16 suggests the patient may benefit from discharge to home.  Barriers to Discharge: None     Rehab Resource Intensity Level, PT: No post-acute rehabilitation needs    See flowsheet documentation for full assessment.

## 2024-09-16 NOTE — ASSESSMENT & PLAN NOTE
Patient had CTA imaging done in the setting of #1.  She is noted to have some fluid collections in the retroperitoneal space adjacent to retroperitoneal clips.  She does not localize any symptoms to this area and PCN draining clear.  Suspect that these may be postoperative given recent extensive bleeding at end of July.  Will monitor exam for now  Continue antibiotic suppression as above  Would obtain formal CT abdomen/pelvis with oral and IV contrast for completeness  Trend fever curve/vitals  Repeat CBC/chemistry tomorrow  Additional supportive cares per primary  Recommend obtaining prior imaging for comparison and discussing with patient's prior vascular surgeon for completeness.

## 2024-09-16 NOTE — PROGRESS NOTES
Progress Note - Hospitalist   Name: Patsy Dallas 83 y.o. 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  CT of abdomen and pelvis reviewed as outlined.  Patient has an extensive history of bleeding.  Will have hematology evaluate for anticoagulation recommendations.  Previously on warfarin for thromboembolism  Abnormal CT scan  Patient with ongoing chills and rigors.  CT with right retroperitoneal fluid collection as well as medial thigh collection with enhancement  Will need to rule out infectious causes given no ischemia  Will check blood cultures and have infectious disease evaluate  Nephrostomy tube bleed (HCC)  Hx of R ureteral stricture s/p stent c/b pyelonephritis and hydronephrosis  Has persistent bleeding from right nephrostomy, underwent clot evacuation and conversion of right PCN to PCNU and TURBT. 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 Follows with ID at [facility]  Recent treatment for UTI with Vantin x7 days on 9/05  History of cervical cancer  History of recurrent metastatic squamous cervical cancer s/p hysterectomy; metastasis to lung with resection (2014) and SBRT (2018).  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  CTA showing: Right external iliac artery stent is noted and appears patent  Follows with vascular surgery at [facility]  H/O colectomy  History of SBO s/p colectomy. Ileostomy ()   Benign essential hypertension      VTE Pharmacologic Prophylaxis: VTE Score: 6 High Risk (Score >/= 5) - Pharmacological DVT Prophylaxis Contraindicated. Sequential Compression Devices Ordered.    Mobility:   Basic Mobility Inpatient Raw Score: 15  JH-HLM Goal: 4: Move to chair/commode  JH-HLM Achieved: 3: Sit at edge of bed  JH-HLM Goal NOT achieved. Continue with multidisciplinary rounding and encourage appropriate mobility to improve upon JH-HLM goals.    Patient Centered Rounds: I have performed bedside rounds with nursing staff today.  Discussions with Specialists or Other Care Team Provider: Case management vascular surgery    Education and Discussions with Family / Patient: Updated  (daughter) at bedside.    Current Length of Stay: 1 day(s)  Current Patient Status: Inpatient   Certification Statement: The patient will continue to require additional inpatient hospital stay due to further workup last thromboembolic phenomena  Discharge Plan: Anticipate discharge in >72 hrs to be determined    Code Status: Level 1 - Full Code    Subjective   Patient seen and examined.  Complaining of chills and rigors.  Extensive review of systems    Objective     Vitals:   Temp (24hrs), Av °F (36.7 °C), Min:97 °F (36.1 °C), Max:98.8 °F (37.1 °C)    Temp:  [97 °F (36.1 °C)-98.8 °F (37.1 °C)] 98.5 °F (36.9 °C)  HR:  [75-89] 76  Resp:  [16-18] 16  BP: (122-176)/(63-77) 134/63  SpO2:  [96 %-100 %] 100 %  There is no height or weight on file to calculate BMI.     Input and Output Summary (last 24 hours):     Intake/Output Summary (Last 24 hours) at 2024 1242  Last data filed at 9/15/2024 2256  Gross per 24 hour    Intake 500 ml   Output --   Net 500 ml       Physical Exam  Vitals reviewed.   Constitutional:       General: She is not in acute distress.     Appearance: Normal appearance.   HENT:      Head: Atraumatic.   Eyes:      General: No scleral icterus.  Cardiovascular:      Rate and Rhythm: Regular rhythm.   Pulmonary:      Breath sounds: Decreased breath sounds present. No wheezing.   Abdominal:      General: Bowel sounds are normal.      Palpations: Abdomen is soft.      Tenderness: There is no guarding or rebound.      Comments: Ostomy present   Musculoskeletal:      Right lower leg: Edema present.      Left lower leg: Edema present.   Skin:     General: Skin is warm.   Neurological:      General: No focal deficit present.      Mental Status: She is alert and oriented to person, place, and time.   Psychiatric:         Mood and Affect: Mood normal.       Lines/Drains:  Lines/Drains/Airways       Active Status       None                      Telemetry:  Telemetry Orders (From admission, onward)               24 Hour Telemetry Monitoring  Continuous x 24 Hours (Telem)        Question:  Reason for 24 Hour Telemetry  Answer:  Arrhythmias requiring acute medical intervention / PPM or ICD malfunction                     Telemetry Reviewed:   Indication for Continued Telemetry Use:                Lab Results: I have reviewed the following results:    Results from last 7 days   Lab Units 09/16/24  0434 09/15/24  1755   WBC Thousand/uL 7.39 8.56   HEMOGLOBIN g/dL 8.7* 10.0*   PLATELETS Thousands/uL 313 296   MCV fL 88 89   INR   --  1.20*     Results from last 7 days   Lab Units 09/16/24  0434 09/15/24  1755   SODIUM mmol/L 136 134*   POTASSIUM mmol/L 3.4* 3.9   CHLORIDE mmol/L 102 98   CO2 mmol/L 28 29   ANION GAP mmol/L 6 7   BUN mg/dL 16 21   CREATININE mg/dL 0.67 0.81   CALCIUM mg/dL 8.9 9.3   ALBUMIN g/dL 2.9*  --    TOTAL BILIRUBIN mg/dL 0.27  --    ALK PHOS U/L 209*  --    ALT U/L 27  --    AST U/L 29  --    EGFR  ml/min/1.73sq m 80 66   GLUCOSE RANDOM mg/dL 118 105             Recent Cultures (last 7 days):         Imaging:  Reviewed radiology reports from this admission including:  CTA abdominal w run off w wo contrast    Result Date: 9/16/2024  Impression: 1. No evidence of macro embolic disease or other acute arterial process. In the right leg, no significant proximal occlusive disease. There is moderate infrapopliteal disease with single vessel peroneal runoff. 2. No significant aortoiliac or left infrainguinal occlusive disease 3. No culprit lesion seen that would account for thromboembolic disease 4. 2.5 cm collection medial upper right thigh musculature with some peripheral enhancement. Differential includes chronic seroma, evolving hematoma, or abscess in the proper clinical setting. 5. Ill-defined fluid collection in the right retroperitoneum which may be intramuscular adjacent to several retroperitoneal clips. This could represent chronic postop collection. Again, in the proper clinical setting cannot exclude acute infectious process. If there is any clinical concerns for systemic infection, recommend repeat abdomen pelvis CT with IV and oral contrast. Workstation performed: [id number]     CT head without contrast    Result Date: 9/15/2024  Impression: No acute intracranial abnormality.  Chronic microangiopathic changes. Workstation performed: [id number]       Last 24 Hours Medication List:     Current Facility-Administered Medications:     acetaminophen (TYLENOL) tablet 975 mg, Q6H PRN    aluminum-magnesium hydroxide-simethicone (MAALOX) oral suspension 30 mL, Q6H PRN    amoxicillin (AMOXIL) capsule 500 mg, Q12H KASSIDY    ascorbic acid (VITAMIN C) tablet 500 mg, Daily    Cholecalciferol (VITAMIN D3) tablet 1,000 Units, Daily    diazepam (VALIUM) tablet 2.5 mg, Q6H PRN    dicyclomine (BENTYL) capsule 10 mg, TID PRN    docusate sodium (COLACE) capsule 100 mg, BID    HYDROmorphone HCl (DILAUDID) injection 0.2 mg, Q4H  PRN    levothyroxine tablet 50 mcg, Early Morning    lisinopril (ZESTRIL) tablet 2.5 mg, Daily    morphine (MSIR) IR tablet 15 mg, Q6H PRN    multivitamin stress formula tablet 1 tablet, Daily    ondansetron (ZOFRAN-ODT) dispersible tablet 4 mg, Q6H PRN    saccharomyces boulardii (FLORASTOR) capsule 250 mg, BID    Administrative Statements   Today, Patient Was Seen By: Fernando Pacheco, DO  I have spent a total time of 55 minutes in caring for this patient on the day of the visit/encounter including Diagnostic results, Patient and family education, Risk factor reductions, Impressions, Counseling / Coordination of care, Documenting in the medical record, Reviewing / ordering tests, medicine, procedures  , Obtaining or reviewing history  , and Communicating with other healthcare professionals .    **Please Note: This note may have been constructed using a voice recognition system.**

## 2024-09-17 ENCOUNTER — APPOINTMENT (INPATIENT)
Dept: CT IMAGING | Facility: HOSPITAL | Age: 84
DRG: 982 | End: 2024-09-17
Payer: MEDICARE

## 2024-09-17 ENCOUNTER — APPOINTMENT (INPATIENT)
Dept: NON INVASIVE DIAGNOSTICS | Facility: HOSPITAL | Age: 84
DRG: 982 | End: 2024-09-17
Payer: MEDICARE

## 2024-09-17 LAB
ALBUMIN SERPL BCG-MCNC: 2.8 G/DL (ref 3.5–5)
ALP SERPL-CCNC: 164 U/L (ref 34–104)
ALT SERPL W P-5'-P-CCNC: 22 U/L (ref 7–52)
ANION GAP SERPL CALCULATED.3IONS-SCNC: 7 MMOL/L (ref 4–13)
AORTIC ROOT: 3.1 CM
AORTIC VALVE MEAN VELOCITY: 11.1 M/S
APICAL FOUR CHAMBER EJECTION FRACTION: 61 %
APTT PPP: 90 SECONDS (ref 23–34)
ASCENDING AORTA: 3.1 CM
AST SERPL W P-5'-P-CCNC: 24 U/L (ref 13–39)
AV LVOT MEAN GRADIENT: 4 MMHG
AV LVOT PEAK GRADIENT: 7 MMHG
AV MEAN GRADIENT: 6 MMHG
AV PEAK GRADIENT: 10 MMHG
AV VELOCITY RATIO: 0.84
BILIRUB SERPL-MCNC: 0.26 MG/DL (ref 0.2–1)
BSA FOR ECHO PROCEDURE: 1.56 M2
BUN SERPL-MCNC: 15 MG/DL (ref 5–25)
CALCIUM ALBUM COR SERPL-MCNC: 9.9 MG/DL (ref 8.3–10.1)
CALCIUM SERPL-MCNC: 8.9 MG/DL (ref 8.4–10.2)
CHLORIDE SERPL-SCNC: 101 MMOL/L (ref 96–108)
CO2 SERPL-SCNC: 28 MMOL/L (ref 21–32)
CREAT SERPL-MCNC: 0.76 MG/DL (ref 0.6–1.3)
DOP CALC AO PEAK VEL: 1.59 M/S
DOP CALC AO VTI: 30.98 CM
DOP CALC LVOT PEAK VEL VTI: 23.05 CM
DOP CALC LVOT PEAK VEL: 1.33 M/S
E WAVE DECELERATION TIME: 232 MS
E/A RATIO: 0.79
ERYTHROCYTE [DISTWIDTH] IN BLOOD BY AUTOMATED COUNT: 17.3 % (ref 11.6–15.1)
FRACTIONAL SHORTENING: 38 (ref 28–44)
GFR SERPL CREATININE-BSD FRML MDRD: 71 ML/MIN/1.73SQ M
GLUCOSE SERPL-MCNC: 96 MG/DL (ref 65–140)
HCT VFR BLD AUTO: 27.2 % (ref 34.8–46.1)
HEMOCCULT STL QL: POSITIVE
HGB BLD-MCNC: 8.6 G/DL (ref 11.5–15.4)
INTERVENTRICULAR SEPTUM IN DIASTOLE (PARASTERNAL SHORT AXIS VIEW): 1.3 CM
INTERVENTRICULAR SEPTUM: 1.3 CM (ref 0.6–1.1)
LAAS-AP2: 17.5 CM2
LAAS-AP4: 14.8 CM2
LEFT ATRIUM SIZE: 3.6 CM
LEFT ATRIUM VOLUME (MOD BIPLANE): 40 ML
LEFT ATRIUM VOLUME INDEX (MOD BIPLANE): 25.6 ML/M2
LEFT INTERNAL DIMENSION IN SYSTOLE: 2.1 CM (ref 2.1–4)
LEFT VENTRICULAR INTERNAL DIMENSION IN DIASTOLE: 3.4 CM (ref 3.5–6)
LEFT VENTRICULAR POSTERIOR WALL IN END DIASTOLE: 1 CM
LEFT VENTRICULAR STROKE VOLUME: 34 ML
LVSV (TEICH): 34 ML
MCH RBC QN AUTO: 28 PG (ref 26.8–34.3)
MCHC RBC AUTO-ENTMCNC: 31.6 G/DL (ref 31.4–37.4)
MCV RBC AUTO: 89 FL (ref 82–98)
MV E'TISSUE VEL-SEP: 6 CM/S
MV PEAK A VEL: 0.94 M/S
MV PEAK E VEL: 74 CM/S
MV STENOSIS PRESSURE HALF TIME: 67 MS
MV VALVE AREA P 1/2 METHOD: 3.28
PLATELET # BLD AUTO: 323 THOUSANDS/UL (ref 149–390)
PMV BLD AUTO: 9.1 FL (ref 8.9–12.7)
POTASSIUM SERPL-SCNC: 3.8 MMOL/L (ref 3.5–5.3)
PROCALCITONIN SERPL-MCNC: 1 NG/ML
PROT SERPL-MCNC: 6 G/DL (ref 6.4–8.4)
RBC # BLD AUTO: 3.07 MILLION/UL (ref 3.81–5.12)
RIGHT ATRIAL 2D VOLUME: 32 ML
RIGHT ATRIUM AREA SYSTOLE A4C: 15 CM2
RIGHT VENTRICLE ID DIMENSION: 2.8 CM
SL CV LEFT ATRIUM LENGTH A2C: 5.3 CM
SL CV LV EF: 68
SL CV PED ECHO LEFT VENTRICLE DIASTOLIC VOLUME (MOD BIPLANE) 2D: 48 ML
SL CV PED ECHO LEFT VENTRICLE SYSTOLIC VOLUME (MOD BIPLANE) 2D: 15 ML
SODIUM SERPL-SCNC: 136 MMOL/L (ref 135–147)
TR MAX PG: 21 MMHG
TR PEAK VELOCITY: 2.3 M/S
TRICUSPID ANNULAR PLANE SYSTOLIC EXCURSION: 2 CM
TRICUSPID VALVE PEAK REGURGITATION VELOCITY: 2.27 M/S
WBC # BLD AUTO: 6.51 THOUSAND/UL (ref 4.31–10.16)

## 2024-09-17 PROCEDURE — 85730 THROMBOPLASTIN TIME PARTIAL: CPT | Performed by: INTERNAL MEDICINE

## 2024-09-17 PROCEDURE — 93922 UPR/L XTREMITY ART 2 LEVELS: CPT | Performed by: SURGERY

## 2024-09-17 PROCEDURE — 99232 SBSQ HOSP IP/OBS MODERATE 35: CPT | Performed by: INTERNAL MEDICINE

## 2024-09-17 PROCEDURE — 99232 SBSQ HOSP IP/OBS MODERATE 35: CPT | Performed by: SURGERY

## 2024-09-17 PROCEDURE — 82272 OCCULT BLD FECES 1-3 TESTS: CPT | Performed by: INTERNAL MEDICINE

## 2024-09-17 PROCEDURE — 93925 LOWER EXTREMITY STUDY: CPT | Performed by: SURGERY

## 2024-09-17 PROCEDURE — 93306 TTE W/DOPPLER COMPLETE: CPT | Performed by: INTERNAL MEDICINE

## 2024-09-17 PROCEDURE — 86789 WEST NILE VIRUS ANTIBODY: CPT | Performed by: INTERNAL MEDICINE

## 2024-09-17 PROCEDURE — 86788 WEST NILE VIRUS AB IGM: CPT | Performed by: INTERNAL MEDICINE

## 2024-09-17 PROCEDURE — 97116 GAIT TRAINING THERAPY: CPT

## 2024-09-17 PROCEDURE — 71275 CT ANGIOGRAPHY CHEST: CPT

## 2024-09-17 PROCEDURE — 93306 TTE W/DOPPLER COMPLETE: CPT

## 2024-09-17 PROCEDURE — 97530 THERAPEUTIC ACTIVITIES: CPT

## 2024-09-17 PROCEDURE — 80053 COMPREHEN METABOLIC PANEL: CPT | Performed by: INTERNAL MEDICINE

## 2024-09-17 PROCEDURE — 85027 COMPLETE CBC AUTOMATED: CPT | Performed by: INTERNAL MEDICINE

## 2024-09-17 PROCEDURE — 93923 UPR/LXTR ART STDY 3+ LVLS: CPT

## 2024-09-17 PROCEDURE — 93925 LOWER EXTREMITY STUDY: CPT

## 2024-09-17 PROCEDURE — 84145 PROCALCITONIN (PCT): CPT | Performed by: INTERNAL MEDICINE

## 2024-09-17 RX ORDER — HEPARIN SODIUM 1000 [USP'U]/ML
2200 INJECTION, SOLUTION INTRAVENOUS; SUBCUTANEOUS EVERY 6 HOURS PRN
Status: DISCONTINUED | OUTPATIENT
Start: 2024-09-17 | End: 2024-09-19

## 2024-09-17 RX ORDER — HEPARIN SODIUM 1000 [USP'U]/ML
4400 INJECTION, SOLUTION INTRAVENOUS; SUBCUTANEOUS EVERY 6 HOURS PRN
Status: DISCONTINUED | OUTPATIENT
Start: 2024-09-17 | End: 2024-09-19

## 2024-09-17 RX ORDER — HEPARIN SODIUM 1000 [USP'U]/ML
4400 INJECTION, SOLUTION INTRAVENOUS; SUBCUTANEOUS ONCE
Status: COMPLETED | OUTPATIENT
Start: 2024-09-17 | End: 2024-09-17

## 2024-09-17 RX ORDER — HEPARIN SODIUM 10000 [USP'U]/100ML
3-30 INJECTION, SOLUTION INTRAVENOUS
Status: DISCONTINUED | OUTPATIENT
Start: 2024-09-17 | End: 2024-09-19

## 2024-09-17 RX ADMIN — OXYCODONE HYDROCHLORIDE AND ACETAMINOPHEN 500 MG: 500 TABLET ORAL at 09:31

## 2024-09-17 RX ADMIN — Medication 1000 UNITS: at 09:31

## 2024-09-17 RX ADMIN — LISINOPRIL 2.5 MG: 5 TABLET ORAL at 09:31

## 2024-09-17 RX ADMIN — LEVOTHYROXINE SODIUM 50 MCG: 50 TABLET ORAL at 05:31

## 2024-09-17 RX ADMIN — ACETAMINOPHEN 975 MG: 325 TABLET ORAL at 11:16

## 2024-09-17 RX ADMIN — B-COMPLEX W/ C & FOLIC ACID TAB 1 TABLET: TAB at 09:31

## 2024-09-17 RX ADMIN — DOCUSATE SODIUM 100 MG: 100 CAPSULE, LIQUID FILLED ORAL at 09:31

## 2024-09-17 RX ADMIN — AMOXICILLIN 500 MG: 500 CAPSULE ORAL at 21:36

## 2024-09-17 RX ADMIN — AMOXICILLIN 500 MG: 500 CAPSULE ORAL at 09:30

## 2024-09-17 RX ADMIN — Medication 250 MG: at 17:31

## 2024-09-17 RX ADMIN — HEPARIN SODIUM 4400 UNITS: 1000 INJECTION INTRAVENOUS; SUBCUTANEOUS at 13:54

## 2024-09-17 RX ADMIN — IOHEXOL 85 ML: 350 INJECTION, SOLUTION INTRAVENOUS at 08:18

## 2024-09-17 RX ADMIN — Medication 250 MG: at 09:31

## 2024-09-17 RX ADMIN — HEPARIN SODIUM 18 UNITS/KG/HR: 10000 INJECTION, SOLUTION INTRAVENOUS at 13:56

## 2024-09-17 NOTE — ASSESSMENT & PLAN NOTE
Patient has history of metastatic cervical cancer and she was diagnosed with metastatic lesion to the lung in 2018.  She completed localized radiation but no further chemotherapy.  She has been receiving holistic therapy since.  She was under surveillance.  Would question if this may have led to a hypercoagulable state leading to #1.  She also has background of prior non-Hodgkin's lymphoma.  Oncology evaluation noted  Potential restart of anticoagulation  Recommend venous duplex as above  Continue suppressive antibiotic  Follow-up further workup including blood cultures

## 2024-09-17 NOTE — PLAN OF CARE
Problem: PHYSICAL THERAPY ADULT  Goal: Performs mobility at highest level of function for planned discharge setting.  See evaluation for individualized goals.  Description: Treatment/Interventions: Functional transfer training, LE strengthening/ROM, Elevations, Therapeutic exercise, Patient/family training, Equipment eval/education, Bed mobility, Gait training, Continued evaluation, Spoke to nursing, Spoke to advanced practitioner, OT, Family          See flowsheet documentation for full assessment, interventions and recommendations.  Outcome: Progressing  Note: Prognosis: Good  Problem List: Decreased strength, Decreased range of motion, Decreased endurance, Decreased mobility, Pain, Decreased skin integrity, Impaired balance, Impaired judgement  Assessment: Pt. progressing well with overall mobility. Pt. needed only Min A for sit to supine transfer. CUes for hand placement during transfer. No LOB noted during ambulation however noted pt. with very slow gait with short steps and excess forward flexion. Cues to correct gait deviations. Pt. back in bed with all needs within reach and bed alarm engaged. Recommended pt. to continue ambulating on the unit with the staff and told restorative staff the same. Will continue to follow per PT POC.  Barriers to Discharge: None     Rehab Resource Intensity Level, PT: No post-acute rehabilitation needs    See flowsheet documentation for full assessment.

## 2024-09-17 NOTE — PROGRESS NOTES
Progress Note - Hospitalist   Name: Patsy Dallas 83 y.o. 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  CTA chest abdomen pelvis with source.  Follow-up on echocardiogram  Previously on warfarin for thromboembolism.  Vascular and hematology recommended anticoagulation.  Will start heparin infusion.  Abnormal CT scan  Patient with ongoing chills and rigors.  CT with right retroperitoneal fluid collection as well as medial thigh collection with enhancement  Will need to rule out infectious causes.  Appreciate ID consult.  Follow-up blood cultures.  Discussed with family.  If negative will need to have IR evaluate collection.  Nephrostomy tube bleed (HCC)  Hx of R ureteral stricture s/p stent c/b pyelonephritis and hydronephrosis  Has persistent bleeding from right nephrostomy, underwent clot evacuation and conversion of right PCN to PCNU and TURBT. 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 Follows with ID at [facility]  Recent treatment for UTI with Vantin x7 days on 9/05  History of cervical cancer  History of recurrent metastatic squamous cervical cancer s/p hysterectomy; metastasis to lung with resection (2014) and SBRT (2018).  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 Ileostomy ()     VTE Pharmacologic Prophylaxis: VTE Score: 6 High Risk (Score >/= 5) - Pharmacological DVT Prophylaxis Ordered: heparin drip. Sequential Compression Devices Ordered.    Mobility:   Basic Mobility Inpatient Raw Score: 19  JH-HLM Goal: 6: Walk 10 steps or more  JH-HLM Achieved: 6: Walk 10 steps or more  JH-HLM Goal achieved. Continue to encourage appropriate mobility.    Patient Centered Rounds: I have performed bedside rounds with nursing staff today.  Discussions with Specialists or Other Care Team Provider: Case management infectious disease hematology    Education and Discussions with Family / Patient: Updated  (daughter) at bedside.    Current Length of Stay: 2 day(s)  Current Patient Status: Inpatient   Certification Statement: The patient will continue to require additional inpatient hospital stay due to workup of ischemic toe  Discharge Plan: Anticipate discharge in 48-72 hrs to home with home services.    Code Status: Level 1 - Full Code    Subjective   Patient seen and examined.  Tired today but able to answer questions.    Objective     Vitals:   Temp (24hrs), Av.4 °F (36.9 °C), Min:97.6 °F (36.4 °C), Max:99 °F (37.2 °C)    Temp:  [97.6 °F (36.4 °C)-99 °F (37.2 °C)] 98.8 °F (37.1 °C)  HR:  [71-88] 80  Resp:  [16-18] 16  BP: (113-170)/(59-72) 118/59  SpO2:  [94 %-98 %] 98 %  Body mass index is 24 kg/m².     Input and Output Summary (last 24 hours):   No intake or output data in the 24 hours ending 24 1243    Physical Exam  Vitals reviewed.    Constitutional:       General: She is not in acute distress.     Appearance: Normal appearance.   HENT:      Head: Atraumatic.   Eyes:      Extraocular Movements: Extraocular movements intact.   Cardiovascular:      Rate and Rhythm: Regular rhythm.   Pulmonary:      Breath sounds: Decreased breath sounds present. No wheezing.   Abdominal:      General: Bowel sounds are normal.      Palpations: Abdomen is soft.      Tenderness: There is no abdominal tenderness. There is no guarding.   Musculoskeletal:         General: No swelling.   Skin:     General: Skin is warm.   Neurological:      General: No focal deficit present.      Mental Status: She is alert. Mental status is at baseline.   Psychiatric:         Mood and Affect: Mood normal.       Lines/Drains:  Lines/Drains/Airways       Active Status       Name Placement date Placement time Site Days    Nephrostomy Retrograde/ Ileal Conduit Right 24  1327  Right  less than 1    Colostomy LLQ 03/10/21  1326  LLQ  1286                      Telemetry:  Telemetry Orders (From admission, onward)               24 Hour Telemetry Monitoring  Continuous x 24 Hours (Telem)           Question:  Reason for 24 Hour Telemetry  Answer:  Arrhythmias requiring acute medical intervention / PPM or ICD malfunction                     Telemetry Reviewed:   Indication for Continued Telemetry Use:                Lab Results: I have reviewed the following results:    Results from last 7 days   Lab Units 24  1510 24  0434 09/15/24  1755   WBC Thousand/uL 6.51  --  7.39 8.56   HEMOGLOBIN g/dL 8.6*  --  8.7* 10.0*   PLATELETS Thousands/uL 323  --  313 296   MCV fL 89  --  88 89   INR   --  1.07  --  1.20*     Results from last 7 days   Lab Units 248 24  0434 09/15/24  1755   SODIUM mmol/L 136 136 134*   POTASSIUM mmol/L 3.8 3.4* 3.9   CHLORIDE mmol/L 101 102 98   CO2 mmol/L 28 28 29   ANION GAP mmol/L 7 6 7   BUN mg/dL 15 16 21   CREATININE  mg/dL 0.76 0.67 0.81   CALCIUM mg/dL 8.9 8.9 9.3   ALBUMIN g/dL 2.8* 2.9*  --    TOTAL BILIRUBIN mg/dL 0.26 0.27  --    ALK PHOS U/L 164* 209*  --    ALT U/L 22 27  --    AST U/L 24 29  --    EGFR ml/min/1.73sq m 71 80 66   GLUCOSE RANDOM mg/dL 96 118 105                      Results from last 7 days   Lab Units 09/17/24  0448   PROCALCITONIN ng/ml 1.00*                   Recent Cultures (last 7 days):   Results from last 7 days   Lab Units 09/16/24  1519 09/16/24  1509   BLOOD CULTURE  Received in Microbiology Lab. Culture in Progress. Received in Microbiology Lab. Culture in Progress.       Imaging:  Reviewed radiology reports from this admission including:  CTA chest pe study    Result Date: 9/17/2024  Impression: 1.  No identifiable acute abnormality to account for the patient's clinical presentation. No evidence of a pulmonary embolus. 2.  Mild to moderate aortic atherosclerotic disease, without evidence of aneurysm formation or ulcerating plaques. 3.  Mild diffuse nonspecific subcutaneous edema. Workstation performed: [id number]     Last 24 Hours Medication List:     Current Facility-Administered Medications:     acetaminophen (TYLENOL) tablet 975 mg, Q6H PRN    aluminum-magnesium hydroxide-simethicone (MAALOX) oral suspension 30 mL, Q6H PRN    amoxicillin (AMOXIL) capsule 500 mg, Q12H KASSIDY    ascorbic acid (VITAMIN C) tablet 500 mg, Daily    Cholecalciferol (VITAMIN D3) tablet 1,000 Units, Daily    diazepam (VALIUM) tablet 2.5 mg, Q6H PRN    dicyclomine (BENTYL) capsule 10 mg, TID PRN    docusate sodium (COLACE) capsule 100 mg, BID    HYDROmorphone HCl (DILAUDID) injection 0.2 mg, Q4H PRN    levothyroxine tablet 50 mcg, Early Morning    lisinopril (ZESTRIL) tablet 2.5 mg, Daily    morphine (MSIR) IR tablet 15 mg, Q6H PRN    multivitamin stress formula tablet 1 tablet, Daily    ondansetron (ZOFRAN-ODT) dispersible tablet 4 mg, Q6H PRN    saccharomyces boulardii (FLORASTOR) capsule 250 mg, BID    Administrative  Statements   Today, Patient Was Seen By: Fernando Pacheco, DO  I have spent a total time of 40 minutes in caring for this patient on the day of the visit/encounter including Diagnostic results, Patient and family education, Impressions, Counseling / Coordination of care, Documenting in the medical record, Reviewing / ordering tests, medicine, procedures  , Obtaining or reviewing history  , and Communicating with other healthcare professionals .    **Please Note: This note may have been constructed using a voice recognition system.**

## 2024-09-17 NOTE — ASSESSMENT & PLAN NOTE
Patient with ongoing chills and rigors.  CT with right retroperitoneal fluid collection as well as medial thigh collection with enhancement  Will need to rule out infectious causes.  Appreciate ID consult.  Follow-up blood cultures.  Discussed with family.  If negative will need to have IR evaluate collection.

## 2024-09-17 NOTE — RESTORATIVE TECHNICIAN NOTE
Restorative Technician Note      Patient Name: Patsy Dallas     Restorative Tech Visit Date: 09/17/24  Note Type: Mobility  Patient Position Upon Consult: Supine  Mobility / Activity Provided: Assisted PTA with managing equipment and follow  Activity Performed: Ambulated; Range of motion  Assistive Device: Other (Comment) (Rollator)  Patient Position at End of Consult: All needs within reach; Supine

## 2024-09-17 NOTE — ASSESSMENT & PLAN NOTE
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  CTA chest abdomen pelvis with source.  Follow-up on echocardiogram  Previously on warfarin for thromboembolism.  Vascular and hematology recommended anticoagulation.  Will start heparin infusion.

## 2024-09-17 NOTE — PROGRESS NOTES
Progress Note - Infectious Disease   Name: Patsy Dallas 83 y.o. female I MRN: [id number]  Unit/Bed#: E2 -01 I Date of Admission: 9/15/2024   Date of Service: 9/17/2024 I Hospital Day: 2     Assessment & Plan  Ischemia of toe  Patient presents with issues of progressing pain in her leg and thigh and new ischemic changes to her right great toe.  The toe itself is tender with skin changes on exam.  There is minimal findings on the thigh including any external injuries, bruising or skin changes.  There is a nonspecific fluid collection on CT in the medial upper right thigh with broad differential.  She otherwise had isolated episode of chills along with the ongoing pain but no recorded fever during her travels.  White count, vitals and other labs otherwise unremarkable.  Suspicion remains for an acute embolic occlusion leading to the changes in her toe which could be contributing to her pain and sensation of chills.  Would also question if she has an acute on chronic DVT in the thigh leading to her pain.  Considering other causes for chills including mosquito related illness given her travel but exposures limited and also other respiratory viruses at this time of year.  RP 2 negative.  Will continue on amoxicillin suppression given #3 and #4  Follow-up pending blood cultures  Follow-up check West Nile serology  Repeat CBC/chemistry tomorrow to monitor stability on current regimen  Would recommend obtaining venous duplex of the right leg  Recommend IR/surgical intervention of fluid seen on thigh images  Podiatry evaluation appreciated  Vascular evaluation noted  Hematology evaluation noted  Trend fever curve/vitals  Additional supportive cares per primary  Additional interventions pending clinical course  Eventual transition of care back to chronic providers after discharge.  Abnormal CT scan  Patient had CTA imaging done in the setting of #1.  She is noted to have some fluid collections in the retroperitoneal  space adjacent to retroperitoneal clips.  She does not localize any symptoms to this area and PCN draining clear.  Suspect that these may be postoperative given recent extensive bleeding at end of July.  Will monitor exam for now  Continue antibiotic suppression as above  Will obtain further imaging, based on clinical course  Trend fever curve/vitals  Repeat CBC/chemistry tomorrow  Additional supportive cares per primary  If further imaging obtained would review with outside providers for comparison.  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  She underwent stenting of the site at the end of July after acute significant bleed.  Stent appears to be open on current CTA image.  Nephrostomy draining clear.  Continue amoxicillin suppression per [facility]  Trend fever curve/vitals  Repeat CBC/chemistry tomorrow  Ongoing follow-up by vascular  If additional imaging obtained would review with outside providers  Monitor exam otherwise    Nephrostomy tube bleed (HCC)  Patient with chronic hematuria from her nephrostomy and was found to have #3.  Currently with clear output.  Will monitor for now.  Continue suppressive antibiotic as above.  History of cervical cancer  Patient has history of metastatic cervical cancer and she was diagnosed with metastatic lesion to the lung in 2018.  She completed localized radiation but no further chemotherapy.  She has been receiving holistic therapy since.  She was under surveillance.  Would question if this may have led to a hypercoagulable state leading to #1.  She also has background of prior non-Hodgkin's lymphoma.  Oncology evaluation noted  Potential restart of anticoagulation  Recommend venous duplex as above  Continue suppressive antibiotic  Follow-up further workup including blood cultures    Above plan discussed briefly with the patient and her daughter at bedside  Above  plan discussed in detail with primary service attending who is aware of recommendations for venous Dopplers along with surgical/IR evaluation of thigh collection.    ID consult service will continue to follow.    Antibiotics:  Amoxicillin suppression ongoing    24 Hour events:  Yesterday and overnight notes reviewed and no acute events noted    Subjective:  Patient seen at bedside this morning and her daughter expresses concern about her being slightly more tired today.  Patient feels like she is sleeping better and getting some relief from the morphine.  She again notices the discomfort in her toe and intermittently in the thigh.  She again denied having any traumas or injury to the thigh itself and pain development was spontaneous. We discussed having surgical/IR evaluation of that site.  We discussed workup being negative thus far and following up cultures currently.  Family is hoping to adjust patient's diet as her intake has been poor.  Hematology and vascular evaluations noted.    Objective:  Vitals:  Temp:  [97.6 °F (36.4 °C)-99 °F (37.2 °C)] 98.8 °F (37.1 °C)  HR:  [71-88] 80  Resp:  [16-18] 16  BP: (113-170)/(59-72) 118/59  SpO2:  [94 %-98 %] 98 %  Temp (24hrs), Av.4 °F (36.9 °C), Min:97.6 °F (36.4 °C), Max:99 °F (37.2 °C)  Current: Temperature: 98.8 °F (37.1 °C)    Physical Exam:   General Appearance:  Alert, interactive, nontoxic, no acute distress.  Intermittently closes her eyes during conversation.  She seems to be answering questions appropriately.   Throat: Oropharynx moist without lesions.    Lungs:   Clear to auscultation bilaterally; no wheezes, rhonchi or rales; respirations unlabored on room air   Heart:  RRR; no murmur, rub or gallop noted   Abdomen:   Soft, non-tender, non-distended, positive bowel sounds.  Percutaneous nephrostomy draining clear urine   Extremities: No clubbing, cyanosis or edema; ongoing changes of lymphedema in the lower legs.  Toe remains discolored at this time.   Again no external changes appreciated to the upper thigh.   Skin: No new rashes or lesions. No new draining wounds noted.       Labs, Imaging, & Other studies:   All pertinent labs and imaging studies in PACS were personally reviewed as below.  Results from last 7 days   Lab Units 09/17/24 0448 09/16/24  0434 09/15/24  1755   WBC Thousand/uL 6.51 7.39 8.56   HEMOGLOBIN g/dL 8.6* 8.7* 10.0*   PLATELETS Thousands/uL 323 313 296     Results from last 7 days   Lab Units 09/17/24  0448 09/16/24  0434   POTASSIUM mmol/L 3.8 3.4*   CHLORIDE mmol/L 101 102   CO2 mmol/L 28 28   BUN mg/dL 15 16   CREATININE mg/dL 0.76 0.67   EGFR ml/min/1.73sq m 71 80   CALCIUM mg/dL 8.9 8.9   AST U/L 24 29   ALT U/L 22 27   ALK PHOS U/L 164* 209*     Results from last 7 days   Lab Units 09/16/24  1519 09/16/24  1509   BLOOD CULTURE  Received in Microbiology Lab. Culture in Progress. Received in Microbiology Lab. Culture in Progress.       Lab interpretation/comments: No leukocytosis    Imaging interpretation/comments: CT PE completed and there was no identifiable PE noted and patient with diffuse subcutaneous edema    Culture data: Blood cultures pending and respiratory viral panel negative    External notes: None

## 2024-09-17 NOTE — ASSESSMENT & PLAN NOTE
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  She underwent stenting of the site at the end of July after acute significant bleed.  Stent appears to be open on current CTA image.  Nephrostomy draining clear.  Continue amoxicillin suppression per [facility]  Trend fever curve/vitals  Repeat CBC/chemistry tomorrow  Ongoing follow-up by vascular  If additional imaging obtained would review with outside providers  Monitor exam otherwise

## 2024-09-17 NOTE — PHYSICAL THERAPY NOTE
Physical Therapy Treatment Note     09/17/24 1610   PT Last Visit   PT Visit Date 09/17/24   Note Type   Note Type Treatment   End of Consult   Patient Position at End of Consult All needs within reach;Supine   Pain Assessment   Pain Assessment Tool 0-10   Pain Score 5   Pain Location/Orientation Orientation: Right;Location: Leg   Restrictions/Precautions   Weight Bearing Precautions Per Order No   Other Precautions Fall Risk;Pain;Multiple lines;Bed Alarm   General   Chart Reviewed Yes   Family/Caregiver Present Yes   Subjective   Subjective Pt. agreeable to PT   Bed Mobility   Rolling L 5  Supervision   Additional items Bedrails;Increased time required   Supine to Sit 5  Supervision   Additional items HOB elevated;Bedrails;Increased time required   Sit to Supine 4  Minimal assistance   Additional items Assist x 1;LE management;Increased time required;Bedrails   Transfers   Sit to Stand 5  Supervision   Additional items Verbal cues   Stand to Sit 5  Supervision   Additional items Verbal cues   Stand pivot 5  Supervision   Additional items Increased time required   Ambulation/Elevation   Gait pattern Improper Weight shift;Forward Flexion;Decreased foot clearance;Narrow EDUARDO;Foward flexed;Short stride;Excessively slow;Decreased toe off;Decreased heel strike   Gait Assistance 5  Supervision   Additional items Assist x 1;Verbal cues   Assistive Device Other (Comment);4-wheeled walker  (Rollator)   Distance 270ft, 80ft   Balance   Static Sitting Good   Dynamic Sitting Fair +   Static Standing Fair   Dynamic Standing Fair -   Ambulatory Fair -   Endurance Deficit   Endurance Deficit Yes   Endurance Deficit Description fatigue   Activity Tolerance   Activity Tolerance Patient tolerated treatment well   Nurse Made Aware yes   Exercises   THR Supine;Bilateral;AAROM;AROM;10 reps   Assessment   Prognosis Good   Problem List Decreased strength;Decreased range of motion;Decreased endurance;Decreased mobility;Pain;Decreased skin  integrity;Impaired balance;Impaired judgement   Assessment Pt. progressing well with overall mobility. Pt. needed only Min A for sit to supine transfer. CUes for hand placement during transfer. No LOB noted during ambulation however noted pt. with very slow gait with short steps and excess forward flexion. Cues to correct gait deviations. Pt. back in bed with all needs within reach and bed alarm engaged. Recommended pt. to continue ambulating on the unit with the staff and told restorative staff the same. Will continue to follow per PT POC.   Barriers to Discharge None   Goals   Patient Goals None reported   STG Expiration Date 09/23/24   PT Treatment Day 1   Plan   Treatment/Interventions Functional transfer training;LE strengthening/ROM;Therapeutic exercise;Family;Spoke to nursing;Bed mobility;Gait training;Equipment eval/education;Patient/family training   Progress Progressing toward goals   PT Frequency 1-2x/wk   Discharge Recommendation   Rehab Resource Intensity Level, PT No post-acute rehabilitation needs   AM-PAC Basic Mobility Inpatient   Turning in Flat Bed Without Bedrails 3   Lying on Back to Sitting on Edge of Flat Bed Without Bedrails 3   Moving Bed to Chair 4   Standing Up From Chair Using Arms 4   Walk in Room 4   Climb 3-5 Stairs With Railing 4   Basic Mobility Inpatient Raw Score 22   Basic Mobility Standardized Score 47.4   [facility] Highest Level Of Mobility   -HLM Goal 7: Walk 25 feet or more   -HLM Achieved 8: Walk 250 feet ot more   End of Consult   Patient Position at End of Consult Supine;All needs within reach;Bed/Chair alarm activated           Veda Mathews PTA    An AM-PAC basic mobility standardized score less than 42.9 suggest the patient may benefit from discharge to post-acute rehab services.

## 2024-09-17 NOTE — ASSESSMENT & PLAN NOTE
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 Urology consulted for hx of ureteral-iliac fistula S/P repair w/ iliac stenting w/ no plans for further urologic intervention. ID consulted for management of chronic ABX suppression for hx of ureteral-iliac fistula.     Vascular surgery consulted for concern of acute limb ischemia versus blue toe syndrome. 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 CTA abd shows patent R EIA stent w/ no significant proximal occlusive disease, mod infrapopliteal disease w/ 1-vessel runoff via peroneal. Pt has 2+ palpable femoral and DP pulses B/L w/ purple discoloration to R great toe. LEAD w/ pressures within healing range. 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    Vascular surgery re-consulted on 9/19 regarding concerns noted on imaging during IR procedure on 9/18. Per IR, imaging is concerning for infection and inflammation next to the covered EIA stent which may be a source of embolic phenomenon. No plans to explant the covered stent as risks of surgery outweigh benefits.    Diagnostics:  -9/18/24 LE venous duplex: Right: Evidence suggestive of chronic occlusive deep vein thrombosis noted throughout the R femoral vein. Collateral vessels noted. Subacute vs. chronic thrombus noted in the greater saphenous vein at the inguinal level. No evidence of superficial thrombophlebitis noted. Doppler evaluation shows a  normal response to augmentation maneuvers. Popliteal, posterior tibial and anterior tibial arterial Doppler waveforms are biphasic. Left: Evaluation shows no evidence of thrombus in the common femoral vein. Doppler evaluation shows a normal response to augmentation maneuvers.   -9/17/24 LEAD: Right: R external iliac stent appears patent. Diffuse lower extremity arterial occlusive disease throughout the femoropopliteal segments without focal stenosis. Evidence of tibioperoneal disease. R ALEX: 1.45 (poorly compressible vessels)/251/0 2nd toe pressure 62. Left: Diffuse lower extremity arterial occlusive disease throughout the femoropopliteal segments without focal stenosis. Evidence of tibioperoneal disease. L ALEX: 1.33/120/73.  -9/17/24 CTA chest/PE study: No identifiable acute abnormality to account for the patient's clinical presentation. No evidence of a pulmonary embolus. Mild to moderate aortic atherosclerotic disease, without evidence of aneurysm formation or ulcerating plaques. Mild diffuse nonspecific subcutaneous edema.  -9/15/24 CT head: No acute intracranial abnormality. Chronic microangiopathic changes.   -9/15/24 CTA abd w/ runoff: No evidence of macro embolic disease or other acute arterial process. In the right leg, no significant proximal occlusive disease. There is moderate infrapopliteal disease with single vessel peroneal runoff. No significant aortoiliac or left infrainguinal occlusive disease. No culprit lesion seen that would account for thromboembolic disease. 2.5 cm collection medial upper right thigh musculature with some peripheral enhancement. Differential includes chronic seroma, evolving hematoma, or abscess in the proper clinical setting. Ill-defined fluid collection in the right retroperitoneum which may be intramuscular adjacent to several retroperitoneal clips. This could represent chronic postop collection. Again, in the proper clinical setting cannot exclude acute infectious process.  If there is any clinical concerns for systemic infection, recommend repeat abdomen pelvis CT with IV and oral contrast.     Plan:  -R hallux discoloration without obvious source of emboli or significant atherosclerotic disease  -Inline flow via peroneal w/ palpable DP pulse  -CTA chest and abd both (-) for obvious embolic source  -No vascular surgical intervention as risks of explant of R EIA stent far outweigh benefits   -Podiatry following for R hallux w/ plans to monitor as an outpt for demarcation; input appreciated  -Hematology following for hx of cancer; input appreciated  -Continue heparin drip w/ transition to oral AC for 3 months to be managed by PCP  -Pt requests to follow up w/  [facility] going forward; will schedule outpt appt  -Will discuss w/ Dr. Barney

## 2024-09-17 NOTE — ASSESSMENT & PLAN NOTE
Hx of uretero-iliac fistula S/p emergent R external iliac artery stent grafting 7/25/2024 by vascular surgery at [facility]  CTA showing: Right external iliac artery stent is noted and appears patent  Follows with vascular surgery at [facility]

## 2024-09-17 NOTE — ASSESSMENT & PLAN NOTE
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  Will have GI evaluate    Results from last 7 days   Lab Units 09/17/24  0448 09/16/24  0434 09/15/24  1755   HEMOGLOBIN g/dL 8.6* 8.7* 10.0*

## 2024-09-17 NOTE — PROGRESS NOTES
Progress Note - Vascular Surgery   Name: Patys Dallas 83 y.o. 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    Vascular surgery consulted for concern of acute limb ischemia versus blue toe syndrome. 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 CTA abd shows patent R EIA stent w/ no significant proximal occlusive disease, mod infrapopliteal disease w/ 1-vessel runoff via peroneal. Pt has 2+ palpable femoral and DP pulses B/L w/ purple discoloration to R great toe.     Diagnostics:  -9/17/24 CTA chest/PE study: No identifiable acute abnormality to account for the patient's clinical presentation. No evidence of a pulmonary embolus. Mild to moderate aortic atherosclerotic disease, without evidence of aneurysm formation or ulcerating plaques. Mild diffuse nonspecific subcutaneous edema.  -9/15/24 CT head: No acute intracranial abnormality. Chronic microangiopathic changes.   -9/15/24 CTA abd w/ runoff: No evidence of macro embolic disease or other acute arterial process. In the right leg, no significant proximal occlusive disease. There is moderate infrapopliteal disease with single vessel peroneal runoff. No significant aortoiliac or left infrainguinal occlusive disease. No culprit lesion seen that would account for thromboembolic disease. 2.5 cm  collection medial upper right thigh musculature with some peripheral enhancement. Differential includes chronic seroma, evolving hematoma, or abscess in the proper clinical setting. Ill-defined fluid collection in the right retroperitoneum which may be intramuscular adjacent to several retroperitoneal clips. This could represent chronic postop collection. Again, in the proper clinical setting cannot exclude acute infectious process. If there is any clinical concerns for systemic infection, recommend repeat abdomen pelvis CT with IV and oral contrast.     Plan:  -R hallux discoloration without obvious source of emboli or significant atherosclerotic disease  -Inline flow via peroneal w/ palpable DP pulse  -CTA chest and abd both (-) for obvious embolic source  -No vascular surgical intervention required  -Will obtain LEAD to eval for future healing potential of R hallux   -Podiatry following for R hallux w/ plans to monitor as an outpt for demarcation; input appreciated  -Hematology consult pending  -Recommend heparin drip w/ transition to oral AC for 3 months to be managed by PCP   -Recommend continued outpt follow up w/ [facility] as pt is known to them  -Will discuss w/ Dr. Ureña    Subjective:  Pt reports ongoing pain in her thigh muscle from groin to knee w/ occasional pain in R hallux. Otherwise, she denies any further complaints at this time.    Objective      Temp:  [97.6 °F (36.4 °C)-99 °F (37.2 °C)] 98.8 °F (37.1 °C)  HR:  [71-88] 88  Resp:  [16-18] 16  BP: (113-170)/(59-72) 170/72  O2 Device: None (Room air)          I/O         09/15 0701  09/16 0700 09/16 0701  09/17 0700 09/17 0701  09/18 0700    IV Piggyback 500      Total Intake(mL/kg) 500      Urine (mL/kg/hr)  400 (0.3)     Total Output  400     Net +500 -400            Unmeasured Urine Occurrence  1 x           Lines/Drains/Airways       Active Status       Name Placement date Placement time Site Days    Nephrostomy Retrograde/ Ileal Conduit Right  09/16/24  1327  Right  less than 1    Colostomy LLQ 03/10/21  1326  LLQ  1286                  Physical Exam  Constitutional:       General: She is awake.      Appearance: Normal appearance.   HENT:      Head: Normocephalic and atraumatic.   Cardiovascular:      Rate and Rhythm: Normal rate and regular rhythm.      Pulses:           Dorsalis pedis pulses are 2+ on the right side and 2+ on the left side.        Posterior tibial pulses are detected w/ Doppler on the right side and detected w/ Doppler on the left side.      Heart sounds: Normal heart sounds. No murmur heard.  Pulmonary:      Effort: Pulmonary effort is normal.      Breath sounds: Normal breath sounds.   Abdominal:      General: Bowel sounds are normal. There is no distension.      Palpations: Abdomen is soft.      Comments: L colostomy   Genitourinary:     Comments: R nephrostomy tube  Musculoskeletal:      Cervical back: Neck supple.   Skin:     General: Skin is warm and dry.      Capillary Refill: Capillary refill takes less than 2 seconds.   Neurological:      General: No focal deficit present.      Mental Status: She is alert and oriented to person, place, and time. Mental status is at baseline.   Psychiatric:         Attention and Perception: Attention normal.         Mood and Affect: Mood and affect normal.         Behavior: Behavior is cooperative.         Thought Content: Thought content normal.       Wound/Incision:      R foot    R foot        CBC with diff:   Lab Results   Component Value Date    WBC 6.51 09/17/2024    HGB 8.6 (L) 09/17/2024    HCT 27.2 (L) 09/17/2024    MCV 89 09/17/2024     09/17/2024    RBC 3.07 (L) 09/17/2024    MCH 28.0 09/17/2024    MCHC 31.6 09/17/2024    RDW 17.3 (H) 09/17/2024    MPV 9.1 09/17/2024    NRBC 0 09/16/2024     BMP/CMP:  Lab Results   Component Value Date    SODIUM 136 09/17/2024    SODIUM 137 08/12/2024    K 3.8 09/17/2024    K 4.5 08/12/2024     09/17/2024     08/12/2024    CO2 28  09/17/2024    CO2 27 08/12/2024    BUN 15 09/17/2024    BUN 22 08/12/2024    CREATININE 0.76 09/17/2024    CREATININE 0.84 08/12/2024    CALCIUM 8.9 09/17/2024    CALCIUM 9.6 08/12/2024    AST 24 09/17/2024    AST 19 08/12/2024    ALT 22 09/17/2024    ALT 9 08/12/2024    ALKPHOS 164 (H) 09/17/2024    ALKPHOS 62 08/12/2024    EGFR 71 09/17/2024    EGFR 68 08/12/2024    EGFR 67 11/18/2020     Coags:   Lab Results   Component Value Date    PT 14.1 05/28/2024    PTT 18 (L) 09/16/2024    INR 1.07 09/16/2024    INR 1.1 05/28/2024     Blood Culture:   Lab Results   Component Value Date    BLOODCX Received in Microbiology Lab. Culture in Progress. 09/16/2024

## 2024-09-17 NOTE — ASSESSMENT & PLAN NOTE
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    Vascular surgery consulted for concern of acute limb ischemia versus blue toe syndrome. 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 CTA abd shows patent R EIA stent w/ no significant proximal occlusive disease, mod infrapopliteal disease w/ 1-vessel runoff via peroneal. Pt has 2+ palpable femoral and DP pulses B/L w/ purple discoloration to R great toe.     Diagnostics:  -9/17/24 CTA chest/PE study: No identifiable acute abnormality to account for the patient's clinical presentation. No evidence of a pulmonary embolus. Mild to moderate aortic atherosclerotic disease, without evidence of aneurysm formation or ulcerating plaques. Mild diffuse nonspecific subcutaneous edema.  -9/15/24 CT head: No acute intracranial abnormality. Chronic microangiopathic changes.   -9/15/24 CTA abd w/ runoff: No evidence of macro embolic disease or other acute arterial process. In the right leg, no significant proximal occlusive disease. There is moderate infrapopliteal disease with single vessel peroneal runoff. No significant aortoiliac or left infrainguinal occlusive disease. No culprit lesion seen that would account for thromboembolic disease. 2.5 cm collection medial upper right thigh musculature with some peripheral enhancement. Differential includes chronic seroma, evolving hematoma, or abscess in the proper clinical setting. Ill-defined fluid collection in the right retroperitoneum  which may be intramuscular adjacent to several retroperitoneal clips. This could represent chronic postop collection. Again, in the proper clinical setting cannot exclude acute infectious process. If there is any clinical concerns for systemic infection, recommend repeat abdomen pelvis CT with IV and oral contrast.     Plan:  -R hallux discoloration without obvious source of emboli or significant atherosclerotic disease  -Inline flow via peroneal w/ palpable DP pulse  -CTA chest and abd both (-) for obvious embolic source  -No vascular surgical intervention required  -Will obtain LEAD to eval for future healing potential of R hallux   -Podiatry following for R hallux w/ plans to monitor as an outpt for demarcation; input appreciated  -Hematology consult pending  -Recommend heparin drip w/ transition to oral AC for 3 months to be managed by PCP   -Recommend continued outpt follow up w/ [facility] as pt is known to them  -Will discuss w/ Dr. Ureña

## 2024-09-17 NOTE — ASSESSMENT & PLAN NOTE
Hx of R ureteral stricture s/p stent c/b pyelonephritis and hydronephrosis  Has persistent bleeding from right nephrostomy, underwent clot evacuation and conversion of right PCN to PCNU and TURBT. 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 Follows with ID at [facility]  Recent treatment for UTI with Vantin x7 days on 9/05

## 2024-09-17 NOTE — ASSESSMENT & PLAN NOTE
Patient presents with issues of progressing pain in her leg and thigh and new ischemic changes to her right great toe.  The toe itself is tender with skin changes on exam.  There is minimal findings on the thigh including any external injuries, bruising or skin changes.  There is a nonspecific fluid collection on CT in the medial upper right thigh with broad differential.  She otherwise had isolated episode of chills along with the ongoing pain but no recorded fever during her travels.  White count, vitals and other labs otherwise unremarkable.  Suspicion remains for an acute embolic occlusion leading to the changes in her toe which could be contributing to her pain and sensation of chills.  Would also question if she has an acute on chronic DVT in the thigh leading to her pain.  Considering other causes for chills including mosquito related illness given her travel but exposures limited and also other respiratory viruses at this time of year.  RP 2 negative.  Will continue on amoxicillin suppression given #3 and #4  Follow-up pending blood cultures  Follow-up check [place] serology  Repeat CBC/chemistry tomorrow to monitor stability on current regimen  Would recommend obtaining venous duplex of the right leg  Recommend IR/surgical intervention of fluid seen on thigh images  Podiatry evaluation appreciated  Vascular evaluation noted  Hematology evaluation noted  Trend fever curve/vitals  Additional supportive cares per primary  Additional interventions pending clinical course  Eventual transition of care back to chronic providers after discharge.

## 2024-09-17 NOTE — ASSESSMENT & PLAN NOTE
Patient had CTA imaging done in the setting of #1.  She is noted to have some fluid collections in the retroperitoneal space adjacent to retroperitoneal clips.  She does not localize any symptoms to this area and PCN draining clear.  Suspect that these may be postoperative given recent extensive bleeding at end of July.  Will monitor exam for now  Continue antibiotic suppression as above  Will obtain further imaging, based on clinical course  Trend fever curve/vitals  Repeat CBC/chemistry tomorrow  Additional supportive cares per primary  If further imaging obtained would review with outside providers for comparison.

## 2024-09-17 NOTE — PLAN OF CARE
Problem: PAIN - ADULT  Goal: Verbalizes/displays adequate comfort level or baseline comfort level  Description: Interventions:  - Encourage patient to monitor pain and request assistance  - Assess pain using appropriate pain scale  - Administer analgesics based on type and severity of pain and evaluate response  - Implement non-pharmacological measures as appropriate and evaluate response  - Consider cultural and social influences on pain and pain management  - Notify physician/advanced practitioner if interventions unsuccessful or patient reports new pain  Outcome: Progressing     Problem: SAFETY ADULT  Goal: Patient will remain free of falls  Description: INTERVENTIONS:  - Educate patient/family on patient safety including physical limitations  - Instruct patient to call for assistance with activity   - Consult OT/PT to assist with strengthening/mobility   - Keep Call bell within reach  - Keep bed low and locked with side rails adjusted as appropriate  - Keep care items and personal belongings within reach  - Initiate and maintain comfort rounds  - Make Fall Risk Sign visible to staff  - Offer Toileting every 2 Hours, in advance of need  - Initiate/Maintain bed alarm  - Apply yellow socks and bracelet for high fall risk patients  - Consider moving patient to room near nurses station  Outcome: Progressing  Goal: Maintain or return to baseline ADL function  Description: INTERVENTIONS:  -  Assess patient's ability to carry out ADLs; assess patient's baseline for ADL function and identify physical deficits which impact ability to perform ADLs (bathing, care of mouth/teeth, toileting, grooming, dressing, etc.)  - Assess/evaluate cause of self-care deficits   - Assess range of motion  - Assess patient's mobility; develop plan if impaired  - Assess patient's need for assistive devices and provide as appropriate  - Encourage maximum independence but intervene and supervise when necessary  - Involve family in performance  of ADLs  - Assess for home care needs following discharge   - Consider OT consult to assist with ADL evaluation and planning for discharge  - Provide patient education as appropriate  Outcome: Progressing     Problem: Prexisting or High Potential for Compromised Skin Integrity  Goal: Skin integrity is maintained or improved  Description: INTERVENTIONS:  - Identify patients at risk for skin breakdown  - Assess and monitor skin integrity  - Assess and monitor nutrition and hydration status  - Monitor labs   - Assess for incontinence   - Turn and reposition patient  - Assist with mobility/ambulation  - Relieve pressure over bony prominences  - Avoid friction and shearing  - Provide appropriate hygiene as needed including keeping skin clean and dry  - Evaluate need for skin moisturizer/barrier cream  - Collaborate with interdisciplinary team   - Patient/family teaching  - Consider wound care consult   Outcome: Progressing

## 2024-09-17 NOTE — PLAN OF CARE
Problem: PAIN - ADULT  Goal: Verbalizes/displays adequate comfort level or baseline comfort level  Description: Interventions:  - Encourage patient to monitor pain and request assistance  - Assess pain using appropriate pain scale  - Administer analgesics based on type and severity of pain and evaluate response  - Implement non-pharmacological measures as appropriate and evaluate response  - Consider cultural and social influences on pain and pain management  - Notify physician/advanced practitioner if interventions unsuccessful or patient reports new pain  Outcome: Progressing     Problem: INFECTION - ADULT  Goal: Absence or prevention of progression during hospitalization  Description: INTERVENTIONS:  - Assess and monitor for signs and symptoms of infection  - Monitor lab/diagnostic results  - Monitor all insertion sites, i.e. indwelling lines, tubes, and drains  - Monitor endotracheal if appropriate and nasal secretions for changes in amount and color  - [place] appropriate cooling/warming therapies per order  - Administer medications as ordered  - Instruct and encourage patient and family to use good hand hygiene technique  - Identify and instruct in appropriate isolation precautions for identified infection/condition  Outcome: Progressing  Goal: Absence of fever/infection during neutropenic period  Description: INTERVENTIONS:  - Monitor WBC    Outcome: Progressing     Problem: SAFETY ADULT  Goal: Patient will remain free of falls  Description: INTERVENTIONS:  - Educate patient/family on patient safety including physical limitations  - Instruct patient to call for assistance with activity   - Consult OT/PT to assist with strengthening/mobility   - Keep Call bell within reach  - Keep bed low and locked with side rails adjusted as appropriate  - Keep care items and personal belongings within reach  - Initiate and maintain comfort rounds  - Make Fall Risk Sign visible to staff  - Offer Toileting every 2 Hours,  in advance of need  - Initiate/Maintain Bed alarm  - Apply yellow socks and bracelet for high fall risk patients  - Consider moving patient to room near nurses station  Outcome: Progressing  Goal: Maintain or return to baseline ADL function  Description: INTERVENTIONS:  -  Assess patient's ability to carry out ADLs; assess patient's baseline for ADL function and identify physical deficits which impact ability to perform ADLs (bathing, care of mouth/teeth, toileting, grooming, dressing, etc.)  - Assess/evaluate cause of self-care deficits   - Assess range of motion  - Assess patient's mobility; develop plan if impaired  - Assess patient's need for assistive devices and provide as appropriate  - Encourage maximum independence but intervene and supervise when necessary  - Involve family in performance of ADLs  - Assess for home care needs following discharge   - Consider OT consult to assist with ADL evaluation and planning for discharge  - Provide patient education as appropriate  Outcome: Progressing  Goal: Maintains/Returns to pre admission functional level  Description: INTERVENTIONS:  - Perform AM-PAC 6 Click Basic Mobility/ Daily Activity assessment daily.  - Set and communicate daily mobility goal to care team and patient/family/caregiver.   - Collaborate with rehabilitation services on mobility goals if consulted  - Perform Range of Motion 3 times a day.  - Reposition patient every 2 hours.  - Dangle patient 3 times a day  - Stand patient 3 times a day  - Ambulate patient 3 times a day  - Out of bed to chair 3 times a day   - Out of bed for meals 3 times a day  - Out of bed for toileting  - Record patient progress and toleration of activity level   Outcome: Progressing     Problem: DISCHARGE PLANNING  Goal: Discharge to home or other facility with appropriate resources  Description: INTERVENTIONS:  - Identify barriers to discharge w/patient and caregiver  - Arrange for needed discharge resources and  transportation as appropriate  - Identify discharge learning needs (meds, wound care, etc.)  - Arrange for interpretive services to assist at discharge as needed  - Refer to Case Management Department for coordinating discharge planning if the patient needs post-hospital services based on physician/advanced practitioner order or complex needs related to functional status, cognitive ability, or social support system  Outcome: Progressing

## 2024-09-18 ENCOUNTER — APPOINTMENT (INPATIENT)
Dept: NON INVASIVE DIAGNOSTICS | Facility: HOSPITAL | Age: 84
DRG: 982 | End: 2024-09-18
Payer: MEDICARE

## 2024-09-18 ENCOUNTER — APPOINTMENT (INPATIENT)
Dept: RADIOLOGY | Facility: HOSPITAL | Age: 84
DRG: 982 | End: 2024-09-18
Payer: MEDICARE

## 2024-09-18 LAB
AMORPH URATE CRY URNS QL MICRO: ABNORMAL
ANION GAP SERPL CALCULATED.3IONS-SCNC: 8 MMOL/L (ref 4–13)
APTT PPP: 72 SECONDS (ref 23–34)
APTT PPP: 77 SECONDS (ref 23–34)
BACTERIA UR QL AUTO: ABNORMAL /HPF
BILIRUB UR QL STRIP: NEGATIVE
BUN SERPL-MCNC: 16 MG/DL (ref 5–25)
CALCIUM SERPL-MCNC: 9 MG/DL (ref 8.4–10.2)
CHLORIDE SERPL-SCNC: 99 MMOL/L (ref 96–108)
CLARITY UR: CLEAR
CO2 SERPL-SCNC: 27 MMOL/L (ref 21–32)
COLOR UR: YELLOW
CREAT SERPL-MCNC: 0.78 MG/DL (ref 0.6–1.3)
ERYTHROCYTE [DISTWIDTH] IN BLOOD BY AUTOMATED COUNT: 17.3 % (ref 11.6–15.1)
FOLATE SERPL-MCNC: >22.3 NG/ML
GFR SERPL CREATININE-BSD FRML MDRD: 69 ML/MIN/1.73SQ M
GLUCOSE SERPL-MCNC: 136 MG/DL (ref 65–140)
GLUCOSE UR STRIP-MCNC: NEGATIVE MG/DL
HCT VFR BLD AUTO: 29.2 % (ref 34.8–46.1)
HGB BLD-MCNC: 9.2 G/DL (ref 11.5–15.4)
HGB RETIC QN AUTO: 30.2 PG (ref 30–38.3)
HGB UR QL STRIP.AUTO: ABNORMAL
HYALINE CASTS #/AREA URNS LPF: ABNORMAL /LPF
IMM RETICS NFR: 18.5 % (ref 0–14)
KETONES UR STRIP-MCNC: NEGATIVE MG/DL
LEUKOCYTE ESTERASE UR QL STRIP: ABNORMAL
MCH RBC QN AUTO: 28.5 PG (ref 26.8–34.3)
MCHC RBC AUTO-ENTMCNC: 31.5 G/DL (ref 31.4–37.4)
MCV RBC AUTO: 90 FL (ref 82–98)
MUCOUS THREADS UR QL AUTO: ABNORMAL
NITRITE UR QL STRIP: POSITIVE
NON-SQ EPI CELLS URNS QL MICRO: ABNORMAL /HPF
PH UR STRIP.AUTO: 6 [PH]
PLATELET # BLD AUTO: 335 THOUSANDS/UL (ref 149–390)
PMV BLD AUTO: 9.4 FL (ref 8.9–12.7)
POTASSIUM SERPL-SCNC: 3.7 MMOL/L (ref 3.5–5.3)
PROCALCITONIN SERPL-MCNC: 0.92 NG/ML
PROT UR STRIP-MCNC: ABNORMAL MG/DL
RBC # BLD AUTO: 3.23 MILLION/UL (ref 3.81–5.12)
RBC #/AREA URNS AUTO: ABNORMAL /HPF
RETICS # AUTO: ABNORMAL 10*3/UL (ref 14097–95744)
RETICS # CALC: 1.36 % (ref 0.37–1.87)
SODIUM SERPL-SCNC: 134 MMOL/L (ref 135–147)
SP GR UR STRIP.AUTO: 1.02 (ref 1–1.03)
UROBILINOGEN UR STRIP-ACNC: <2 MG/DL
VIT B12 SERPL-MCNC: 1784 PG/ML (ref 180–914)
WBC # BLD AUTO: 9.31 THOUSAND/UL (ref 4.31–10.16)
WBC #/AREA URNS AUTO: ABNORMAL /HPF

## 2024-09-18 PROCEDURE — 85730 THROMBOPLASTIN TIME PARTIAL: CPT | Performed by: INTERNAL MEDICINE

## 2024-09-18 PROCEDURE — 85027 COMPLETE CBC AUTOMATED: CPT | Performed by: INTERNAL MEDICINE

## 2024-09-18 PROCEDURE — 80048 BASIC METABOLIC PNL TOTAL CA: CPT | Performed by: INTERNAL MEDICINE

## 2024-09-18 PROCEDURE — 82746 ASSAY OF FOLIC ACID SERUM: CPT | Performed by: STUDENT IN AN ORGANIZED HEALTH CARE EDUCATION/TRAINING PROGRAM

## 2024-09-18 PROCEDURE — NC001 PR NO CHARGE: Performed by: PHYSICIAN ASSISTANT

## 2024-09-18 PROCEDURE — 10005 FNA BX W/US GDN 1ST LES: CPT

## 2024-09-18 PROCEDURE — 10160 PNXR ASPIR ABSC HMTMA BULLA: CPT | Performed by: RADIOLOGY

## 2024-09-18 PROCEDURE — 81001 URINALYSIS AUTO W/SCOPE: CPT | Performed by: INTERNAL MEDICINE

## 2024-09-18 PROCEDURE — 99233 SBSQ HOSP IP/OBS HIGH 50: CPT | Performed by: INTERNAL MEDICINE

## 2024-09-18 PROCEDURE — 87077 CULTURE AEROBIC IDENTIFY: CPT | Performed by: INTERNAL MEDICINE

## 2024-09-18 PROCEDURE — 87205 SMEAR GRAM STAIN: CPT | Performed by: INTERNAL MEDICINE

## 2024-09-18 PROCEDURE — 84145 PROCALCITONIN (PCT): CPT | Performed by: INTERNAL MEDICINE

## 2024-09-18 PROCEDURE — 87070 CULTURE OTHR SPECIMN AEROBIC: CPT | Performed by: INTERNAL MEDICINE

## 2024-09-18 PROCEDURE — 93971 EXTREMITY STUDY: CPT | Performed by: SURGERY

## 2024-09-18 PROCEDURE — 85730 THROMBOPLASTIN TIME PARTIAL: CPT | Performed by: RADIOLOGY

## 2024-09-18 PROCEDURE — 85046 RETICYTE/HGB CONCENTRATE: CPT | Performed by: STUDENT IN AN ORGANIZED HEALTH CARE EDUCATION/TRAINING PROGRAM

## 2024-09-18 PROCEDURE — 76942 ECHO GUIDE FOR BIOPSY: CPT | Performed by: RADIOLOGY

## 2024-09-18 PROCEDURE — 87086 URINE CULTURE/COLONY COUNT: CPT | Performed by: INTERNAL MEDICINE

## 2024-09-18 PROCEDURE — 87075 CULTR BACTERIA EXCEPT BLOOD: CPT | Performed by: INTERNAL MEDICINE

## 2024-09-18 PROCEDURE — 93971 EXTREMITY STUDY: CPT

## 2024-09-18 PROCEDURE — 99223 1ST HOSP IP/OBS HIGH 75: CPT | Performed by: STUDENT IN AN ORGANIZED HEALTH CARE EDUCATION/TRAINING PROGRAM

## 2024-09-18 PROCEDURE — 87186 SC STD MICRODIL/AGAR DIL: CPT | Performed by: INTERNAL MEDICINE

## 2024-09-18 PROCEDURE — 82607 VITAMIN B-12: CPT | Performed by: STUDENT IN AN ORGANIZED HEALTH CARE EDUCATION/TRAINING PROGRAM

## 2024-09-18 PROCEDURE — 0K9Q3ZX DRAINAGE OF RIGHT UPPER LEG MUSCLE, PERCUTANEOUS APPROACH, DIAGNOSTIC: ICD-10-PCS | Performed by: RADIOLOGY

## 2024-09-18 RX ORDER — LIDOCAINE WITH 8.4% SOD BICARB 0.9%(10ML)
SYRINGE (ML) INJECTION AS NEEDED
Status: COMPLETED | OUTPATIENT
Start: 2024-09-18 | End: 2024-09-18

## 2024-09-18 RX ORDER — POLYETHYLENE GLYCOL 3350 17 G/17G
17 POWDER, FOR SOLUTION ORAL 2 TIMES DAILY
Status: DISCONTINUED | OUTPATIENT
Start: 2024-09-18 | End: 2024-09-21 | Stop reason: HOSPADM

## 2024-09-18 RX ADMIN — B-COMPLEX W/ C & FOLIC ACID TAB 1 TABLET: TAB at 08:52

## 2024-09-18 RX ADMIN — POLYETHYLENE GLYCOL 3350 17 G: 17 POWDER, FOR SOLUTION ORAL at 20:49

## 2024-09-18 RX ADMIN — ACETAMINOPHEN 975 MG: 325 TABLET ORAL at 20:49

## 2024-09-18 RX ADMIN — DOCUSATE SODIUM 100 MG: 100 CAPSULE, LIQUID FILLED ORAL at 18:06

## 2024-09-18 RX ADMIN — HEPARIN SODIUM 18 UNITS/KG/HR: 10000 INJECTION, SOLUTION INTRAVENOUS at 13:13

## 2024-09-18 RX ADMIN — Medication 250 MG: at 18:06

## 2024-09-18 RX ADMIN — Medication 250 MG: at 08:52

## 2024-09-18 RX ADMIN — Medication 1000 UNITS: at 08:52

## 2024-09-18 RX ADMIN — OXYCODONE HYDROCHLORIDE AND ACETAMINOPHEN 500 MG: 500 TABLET ORAL at 08:52

## 2024-09-18 RX ADMIN — Medication 5 ML: at 17:34

## 2024-09-18 RX ADMIN — ACETAMINOPHEN 975 MG: 325 TABLET ORAL at 08:05

## 2024-09-18 RX ADMIN — ACETAMINOPHEN 975 MG: 325 TABLET ORAL at 13:49

## 2024-09-18 RX ADMIN — AMOXICILLIN 500 MG: 500 CAPSULE ORAL at 08:52

## 2024-09-18 RX ADMIN — DOCUSATE SODIUM 100 MG: 100 CAPSULE, LIQUID FILLED ORAL at 08:52

## 2024-09-18 RX ADMIN — DIAZEPAM 2.5 MG: 5 TABLET ORAL at 20:53

## 2024-09-18 RX ADMIN — AMOXICILLIN 500 MG: 500 CAPSULE ORAL at 20:49

## 2024-09-18 RX ADMIN — LEVOTHYROXINE SODIUM 50 MCG: 50 TABLET ORAL at 06:03

## 2024-09-18 NOTE — PROGRESS NOTES
Progress Note - Infectious Disease   Name: Patsy Dallas 83 y.o. female I MRN: [id number]  Unit/Bed#: E2 MS Kathi-01 I Date of Admission: 9/15/2024   Date of Service: 9/18/2024 I Hospital Day: 3     Assessment & Plan  Ischemia of toe  Patient presented with progressing pain in her R leg and thigh and new ischemic changes to her right great toe.  The toe itself is tender with ecchymosis on exam. Thigh without external injuries, bruising or skin changes. There is a nonspecific fluid collection on CT in the medial upper right thigh with broad differential. Unclear if this is related to her recent bleeding. Would recommend IR evaluate to see if amenable to drainage. Also consider possible clot related. Recommend venous duplex assessment. LEADs with diffuse arterial occlusive disease but great toe pressures are within healing range. Consider RLE pain may have claudication component. She has additionally reported chills and it's unclear if related to the RLE. Consider recent travel exposure to mosquito related illness, [place] is pending. The patient remains clinically stable without fever or leukocytosis. She has been on chronic amoxicillin from [facility] for #3/4, but there is no indication for additional antibiotic treatment at this time.   -continue amoxicillin suppression per [facility]  -no indication for other antibiotics at this time  -monitor CBCD and BMP  -follow up [place] serology  -monitor vitals  -recommend lower extremity venous duplex  -recommend IR evaluation of R thigh fluid collection  -given possibility of claudication related pain, recommend patient keep leg out in front of her when in bed instead of elevated  Abnormal CT scan  Patient had CTA imaging done in the setting of #1.  She is noted to have some fluid collections in the retroperitoneal space adjacent to retroperitoneal clips.  She does not localize any symptoms to this area and PCN draining clear.  Suspect that these may be postoperative  given recent extensive bleeding at end of July.  -monitor exam for now  -monitor CBCD and BMP  -monitor vitals  -if further imaging obtained would review with outside providers for comparison.  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 She underwent stenting of the site at the end of July after acute significant bleed.  Stent appears to be open on current CTA image.  Nephrostomy draining clear. She has been following outpatient at [facility] and there ID team has her on chronic amoxicillin suppression.  -continue amoxicillin suppression per [facility] ID  -ongoing follow-up by vascular  Nephrostomy tube bleed (HCC)  Patient with chronic hematuria from her nephrostomy and was found to have #3.  Currently with clear output.  Will monitor for now.  Continue suppressive antibiotic as above.  History of cervical cancer  Patient has history of metastatic cervical cancer and she was diagnosed with metastatic lesion to the lung in 2018.  She completed localized radiation but no further chemotherapy.  She has been receiving holistic therapy since.  She was under surveillance.  Would question if this may have led to a hypercoagulable state leading to #1.  She also has background of prior non-Hodgkin's lymphoma.  -oncology evaluation noted  -anticoagulation management per primary service    Above plan was discussed in detail with patient and her son at the bedside.  Above plan was discussed in detail with [facility] who agree with plan for no additional antibiotic at this time.    Antibiotics:  Chronic amoxicillin suppression    Subjective:  Patient reports she's very weak today. Is unable to feed herself breakfast and is requesting help. She has no fever, chills, sweats, shakes; no nausea, vomiting, abdominal pain. She continues to report pain throughout her RLE. Feels her R great toe discoloration is about the same today.  She offers no other symptoms. GI consult has been placed for patient as she had +FOC. Patient reports her stools have frequently looked black in the past, seems to come and go.     Objective:  Vitals:  Temp:  [97.6 °F (36.4 °C)-99.4 °F (37.4 °C)] 98.6 °F (37 °C)  HR:  [80-96] 82  Resp:  [16-18] 16  BP: (118-170)/(59-73) 140/64  SpO2:  [94 %-100 %] 100 %  Temp (24hrs), Av.4 °F (36.9 °C), Min:97.6 °F (36.4 °C), Max:99.4 °F (37.4 °C)  Current: Temperature: 98.6 °F (37 °C)    Physical Exam:   General Appearance:  Alert, interactive, nontoxic, no acute distress. She appears chronically weak and debilitated. She appears comfortable sitting up in bed.   Throat: Oropharynx moist without lesions.    Lungs:   Clear to auscultation bilaterally; no wheezes, rhonchi or rales; respirations unlabored on room air.   Heart:  RRR; no murmur, rub or gallop.   Abdomen:   Soft, non-tender, non-distended, positive bowel sounds.     Extremities: No clubbing or cyanosis, no edema. Lymphedema. R great toe ecchymotic, slightly tender to palpate.   Skin: No new rashes or lesions noted on exposed skin.     Labs, Imaging, & Other studies:   All pertinent labs and imaging studies were personally reviewed  Results from last 7 days   Lab Units 24  043   WBC Thousand/uL 9.31 6.51 7.39   HEMOGLOBIN g/dL 9.2* 8.6* 8.7*   PLATELETS Thousands/uL 335 323 313     Results from last 7 days   Lab Units 24  0434   POTASSIUM mmol/L 3.7 3.8 3.4*   CHLORIDE mmol/L 99 101 102   CO2 mmol/L 27 28 28   BUN mg/dL 16 15 16   CREATININE mg/dL 0.78 0.76 0.67   EGFR ml/min/1.73sq m 69 71 80   CALCIUM mg/dL 9.0 8.9 8.9   AST U/L  --  24 29   ALT U/L  --  22 27   ALK PHOS U/L  --  164* 209*     Results from last 7 days   Lab Units 24  1519 24  1509   BLOOD CULTURE  No Growth at 24 hrs. No Growth at 24 hrs.     Results from last 7 days   Lab Units 24  0428 24  0448  09/16/24  1510   PROCALCITONIN ng/ml 0.92* 1.00* 1.24*

## 2024-09-18 NOTE — ASSESSMENT & PLAN NOTE
Hx of uretero-iliac fistula S/p emergent R external iliac artery stent grafting 7/25/2024 by vascular surgery at [facility]  CTA showing: Right external iliac artery stent is noted and appears patent  Follows with vascular surgery at [facility]

## 2024-09-18 NOTE — ASSESSMENT & PLAN NOTE
Patient presented with progressing pain in her R leg and thigh and new ischemic changes to her right great toe.  The toe itself is tender with ecchymosis on exam. Thigh without external injuries, bruising or skin changes. There is a nonspecific fluid collection on CT in the medial upper right thigh with broad differential. Unclear if this is related to her recent bleeding. Would recommend IR evaluate to see if amenable to drainage. Also consider possible clot related. Recommend venous duplex assessment. LEADs with diffuse arterial occlusive disease but great toe pressures are within healing range. Consider RLE pain may have claudication component. She has additionally reported chills and it's unclear if related to the RLE. Consider recent travel exposure to mosquito related illness, [place] is pending. The patient remains clinically stable without fever or leukocytosis. She has been on chronic amoxicillin from [facility] for #3/4, but there is no indication for additional antibiotic treatment at this time.   -continue amoxicillin suppression per [facility] ID  -no indication for other antibiotics at this time  -monitor CBCD and BMP  -follow up [place] serology  -monitor vitals  -recommend lower extremity venous duplex  -recommend IR evaluation of R thigh fluid collection  -given possibility of claudication related pain, recommend patient keep leg out in front of her when in bed instead of elevated

## 2024-09-18 NOTE — BRIEF OP NOTE (RAD/CATH)
INTERVENTIONAL RADIOLOGY PROCEDURE NOTE    Date: 9/18/2024    Procedure: IR ASPIRATION  Procedure Summary       Date:  Room / Location:     Anesthesia Start:  Anesthesia Stop:     Procedure:  Diagnosis:     Scheduled Providers:  Responsible Provider:     Anesthesia Type: Not recorded ASA Status: Not recorded            Preoperative diagnosis:   1. Blue toe syndrome, right (HCC)    2. Abscess of muscle    3. Metastasis from cervical cancer (HCC)    4. Chronic anemia    5. Abscess of muscle         Postoperative diagnosis: Same.    Surgeon: Rita Middleton MD     Assistant: None. No qualified resident was available.    Blood loss: 0    Specimens:   Culture aerobic anaerobic    Findings:   Ultrasound appearance of right thigh most suggestive of myonecrosis    No drainable fluid collection    2 cc bloody fluid aspirated not pus  Okay to restart heparin drip 2 hours      Complications: None immediate.    Anesthesia: local

## 2024-09-18 NOTE — ASSESSMENT & PLAN NOTE
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 She underwent stenting of the site at the end of July after acute significant bleed.  Stent appears to be open on current CTA image.  Nephrostomy draining clear. She has been following outpatient at [facility] and there ID team has her on chronic amoxicillin suppression.  -continue amoxicillin suppression per [facility] ID  -ongoing follow-up by vascular

## 2024-09-18 NOTE — ASSESSMENT & PLAN NOTE
Patient with ongoing chills and rigors.  CT with right retroperitoneal fluid collection as well as medial thigh collection with enhancement  Right retroperitoneal fluid collection could be cause/source of embolism.  Blood cultures negative  IR to evaluate medial thigh collection

## 2024-09-18 NOTE — ASSESSMENT & PLAN NOTE
Patient has history of metastatic cervical cancer and she was diagnosed with metastatic lesion to the lung in 2018.  She completed localized radiation but no further chemotherapy.  She has been receiving holistic therapy since.  She was under surveillance.  Would question if this may have led to a hypercoagulable state leading to #1.  She also has background of prior non-Hodgkin's lymphoma.  -oncology evaluation noted  -anticoagulation management per primary service

## 2024-09-18 NOTE — ASSESSMENT & PLAN NOTE
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   GI did not have any immediate recommendations for endoscopic evaluation    Results from last 7 days   Lab Units 09/18/24  0428 09/17/24  0448 09/16/24  0434 09/15/24  1755   HEMOGLOBIN g/dL 9.2* 8.6* 8.7* 10.0*

## 2024-09-18 NOTE — APP STUDENT NOTE
KATLIN STUDENT  Inpatient Progress Note for TRAINING ONLY  Not Part of Legal Medical Record       Progress Note - Patsy Dallas 83 y.o. female MRN: [id number]    Unit/Bed#: E2 -01 Encounter: [id number]      Assessment & Plan:  Ischemia of toe   Patient is 83 year old female with history of DVT, SBO with ileostomy, nephrostomy tube with ureteroiliac fistula, and cervical cancer admitted for ischemia of her right big toe.   Dx: H&P    Tx: Heparin drip   Pt Ed: Patient understands that at this time we will be managing the ischemia of the toe with a continuous heparin drip. Hematology recommended heparin rather than other forms of anticoagulation due to patients history of bleeding. Patient agreeable to begin heparin at this time. Patient understands that heparin can easily be reversed with protamine sulfate. This offers an advantage as heparin has the shortest half life in comparison to LMWH and DOACs. Patient's PTT is therapeutic at this time for heparin drip. Patient understands that upon discharge further anticoagulation options can be discussed.  2. Abnormal CT scan   Dx: CT scan- medial thigh collection with enhancement   Tx: Rule out infectious causes, blood cultures   Pt Ed: Patient understands that at this time due to patient's chills and rigors it is necessary to further evaluate this soft tissue fluid collection. At 24 hours blood cultures show no growth. Patient denying rigors and chills today. Continue to monitor blood cultures. Re-evaluate at 48 hours for growth.   3. Nephrostomy tube bleed   Dx:  H&P   Tx: Monitor tube for bleeds while on anticoagulation. Continue amoxicillin due to stent placement.    Pt Ed: Patient understands that since she has been started on heparin drip it is important to monitor  nephrostomy tube closely for any acute bleeding as she is now at an increased risk. Patient also understands that she is to continue amoxicillin to reduce the risk of infections from her stent  "placement.   4. Hypothyroidism   Dx: H&P   Tx: continue levothyroxine 50 mcg   Pt Ed: Patient understands importance of continuing her medication to prevent her free T4 from dropping too low. Patient understands common symptoms of hypothyroidism include fatigue, constipation, cold intolerance, weight gain among others. Patient understands to take this medication on an empty stomach at least 1 hour prior to the intake of food.   5. Chronic anemia   Dx: H&P   Tx: Monitor Hgb. GI consult   Pt Ed: Patient understands that due to her history of bleeding it is important to monitor for any bleeding and monitor her hemoglobin levels while in the hospital. Since she has been started on a heparin drip it is also important to watch to ensure that it does not drop significantly indicating possible bleeding somewhere in the body. Latest hemoglobin is 9.2 which increased from yesterdays 8.6 value. Hemoccult as positive, so GI will evaluate further.   Subjective:   Patient seen and examined at bedside. Patient feeling better overall today, less groggy and weak. Patient still complaining of right big toe pain and pain that extends into her leg.     Objective:     Vitals: Blood pressure 108/56, pulse 82, temperature 98.3 °F (36.8 °C), temperature source Oral, resp. rate 16, height 5' 1\" (1.549 m), weight 57.6 kg (127 lb), SpO2 100%.,Body mass index is 24 kg/m².    No intake or output data in the 24 hours ending 09/18/24 0945    Physical Exam: Lungs: clear to auscultation bilaterally  Heart: regular rate and rhythm, S1, S2 normal, no murmur, click, rub or gallop  Extremities: edema of lower extremities bilaterally and right big toe still appears black and blue, less red than day prior.       Invasive Devices       Peripheral Intravenous Line  Duration             Peripheral IV 09/15/24 Left Antecubital 2 days              Drain  Duration             Colostomy LLQ 1287 days    Nephrostomy Retrograde/ Ileal Conduit Right 1 day         " "           Lab, Imaging and other studies: {Results Review Statement:77976::\"No pertinent imaging studies reviewed.\"}  VTE Pharmacologic Prophylaxis: Heparin  VTE Mechanical Prophylaxis: sequential compression device    "

## 2024-09-18 NOTE — OCCUPATIONAL THERAPY NOTE
Occupational Therapy         Patient Name: Patsy Dallas  Today's Date: 9/18/2024 09/18/24 1347   OT Last Visit   OT Visit Date 09/18/24   Note Type   Note type Cancelled Session   Cancel Reasons Patient off floor/test   Additional Comments Pt undergoing bedside testing. Will defer and continue to follow.     Sharifa Ray

## 2024-09-18 NOTE — ASSESSMENT & PLAN NOTE
Hx of R ureteral stricture s/p stent c/b pyelonephritis and hydronephrosis  Has persistent bleeding from right nephrostomy, underwent clot evacuation and conversion of right PCN to PCNU and TURBT. 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 Follows with ID at [facility]

## 2024-09-18 NOTE — ASSESSMENT & PLAN NOTE
Patient had CTA imaging done in the setting of #1.  She is noted to have some fluid collections in the retroperitoneal space adjacent to retroperitoneal clips.  She does not localize any symptoms to this area and PCN draining clear.  Suspect that these may be postoperative given recent extensive bleeding at end of July.  -monitor exam for now  -monitor CBCD and BMP  -monitor vitals  -if further imaging obtained would review with outside providers for comparison.

## 2024-09-18 NOTE — RESTORATIVE TECHNICIAN NOTE
Restorative Technician Note      Patient Name: Patsy Juarezbora     Restorative Tech Visit Date: 09/18/24  Note Type: Mobility  Patient Position Upon Consult: Supine  Activity Performed: Ambulated  Assistive Device: Roller walker  Patient Position at End of Consult: Bedside chair; All needs within reach; Bed/Chair alarm activated            ns

## 2024-09-18 NOTE — PLAN OF CARE
Problem: PAIN - ADULT  Goal: Verbalizes/displays adequate comfort level or baseline comfort level  Description: Interventions:  - Encourage patient to monitor pain and request assistance  - Assess pain using appropriate pain scale  - Administer analgesics based on type and severity of pain and evaluate response  - Implement non-pharmacological measures as appropriate and evaluate response  - Consider cultural and social influences on pain and pain management  - Notify physician/advanced practitioner if interventions unsuccessful or patient reports new pain  Outcome: Progressing     Problem: SAFETY ADULT  Goal: Patient will remain free of falls  Description: INTERVENTIONS:  - Educate patient/family on patient safety including physical limitations  - Instruct patient to call for assistance with activity   - Consult OT/PT to assist with strengthening/mobility   - Keep Call bell within reach  - Keep bed low and locked with side rails adjusted as appropriate  - Keep care items and personal belongings within reach  - Initiate and maintain comfort rounds  - Make Fall Risk Sign visible to staff  - Offer Toileting every 2 Hours, in advance of need  - Initiate/Maintain bed alarm  - Obtain necessary fall risk management equipment: walker  - Apply yellow socks and bracelet for high fall risk patients  - Consider moving patient to room near nurses station  Outcome: Progressing     Problem: Prexisting or High Potential for Compromised Skin Integrity  Goal: Skin integrity is maintained or improved  Description: INTERVENTIONS:  - Identify patients at risk for skin breakdown  - Assess and monitor skin integrity  - Assess and monitor nutrition and hydration status  - Monitor labs   - Assess for incontinence   - Turn and reposition patient  - Assist with mobility/ambulation  - Relieve pressure over bony prominences  - Avoid friction and shearing  - Provide appropriate hygiene as needed including keeping skin clean and dry  -  Evaluate need for skin moisturizer/barrier cream  - Collaborate with interdisciplinary team   - Patient/family teaching  - Consider wound care consult   Outcome: Progressing

## 2024-09-18 NOTE — PLAN OF CARE
Problem: PAIN - ADULT  Goal: Verbalizes/displays adequate comfort level or baseline comfort level  Description: Interventions:  - Encourage patient to monitor pain and request assistance  - Assess pain using appropriate pain scale  - Administer analgesics based on type and severity of pain and evaluate response  - Implement non-pharmacological measures as appropriate and evaluate response  - Consider cultural and social influences on pain and pain management  - Notify physician/advanced practitioner if interventions unsuccessful or patient reports new pain  Outcome: Progressing     Problem: INFECTION - ADULT  Goal: Absence or prevention of progression during hospitalization  Description: INTERVENTIONS:  - Assess and monitor for signs and symptoms of infection  - Monitor lab/diagnostic results  - Monitor all insertion sites, i.e. indwelling lines, tubes, and drains  - Monitor endotracheal if appropriate and nasal secretions for changes in amount and color  - [place] appropriate cooling/warming therapies per order  - Administer medications as ordered  - Instruct and encourage patient and family to use good hand hygiene technique  - Identify and instruct in appropriate isolation precautions for identified infection/condition  Outcome: Progressing  Goal: Absence of fever/infection during neutropenic period  Description: INTERVENTIONS:  - Monitor WBC    Outcome: Progressing     Problem: SAFETY ADULT  Goal: Patient will remain free of falls  Description: INTERVENTIONS:  - Educate patient/family on patient safety including physical limitations  - Instruct patient to call for assistance with activity   - Consult OT/PT to assist with strengthening/mobility   - Keep Call bell within reach  - Keep bed low and locked with side rails adjusted as appropriate  - Keep care items and personal belongings within reach  - Initiate and maintain comfort rounds  - Make Fall Risk Sign visible to staff  - Offer Toileting every 2 Hours,  in advance of need  - Initiate/Maintain bed alarm  - Obtain necessary fall risk management equipment: bed alarm, call bell,  socks  - Apply yellow socks and bracelet for high fall risk patients  - Consider moving patient to room near nurses station  Outcome: Progressing  Goal: Maintain or return to baseline ADL function  Description: INTERVENTIONS:  -  Assess patient's ability to carry out ADLs; assess patient's baseline for ADL function and identify physical deficits which impact ability to perform ADLs (bathing, care of mouth/teeth, toileting, grooming, dressing, etc.)  - Assess/evaluate cause of self-care deficits   - Assess range of motion  - Assess patient's mobility; develop plan if impaired  - Assess patient's need for assistive devices and provide as appropriate  - Encourage maximum independence but intervene and supervise when necessary  - Involve family in performance of ADLs  - Assess for home care needs following discharge   - Consider OT consult to assist with ADL evaluation and planning for discharge  - Provide patient education as appropriate  Outcome: Progressing  Goal: Maintains/Returns to pre admission functional level  Description: INTERVENTIONS:  - Perform AM-PAC 6 Click Basic Mobility/ Daily Activity assessment daily.  - Set and communicate daily mobility goal to care team and patient/family/caregiver.   - Collaborate with rehabilitation services on mobility goals if consulted  - Perform Range of Motion 3 times a day.  - Reposition patient every 2 hours.  - Dangle patient 3 times a day  - Stand patient 3 times a day  - Ambulate patient 3 times a day  - Out of bed to chair 3 times a day   - Out of bed for meals 3 times a day  - Out of bed for toileting  - Record patient progress and toleration of activity level   Outcome: Progressing     Problem: DISCHARGE PLANNING  Goal: Discharge to home or other facility with appropriate resources  Description: INTERVENTIONS:  - Identify barriers to  discharge w/patient and caregiver  - Arrange for needed discharge resources and transportation as appropriate  - Identify discharge learning needs (meds, wound care, etc.)  - Arrange for interpretive services to assist at discharge as needed  - Refer to Case Management Department for coordinating discharge planning if the patient needs post-hospital services based on physician/advanced practitioner order or complex needs related to functional status, cognitive ability, or social support system  Outcome: Progressing     Problem: Knowledge Deficit  Goal: Patient/family/caregiver demonstrates understanding of disease process, treatment plan, medications, and discharge instructions  Description: Complete learning assessment and assess knowledge base.  Interventions:  - Provide teaching at level of understanding  - Provide teaching via preferred learning methods  Outcome: Progressing     Problem: Prexisting or High Potential for Compromised Skin Integrity  Goal: Skin integrity is maintained or improved  Description: INTERVENTIONS:  - Identify patients at risk for skin breakdown  - Assess and monitor skin integrity  - Assess and monitor nutrition and hydration status  - Monitor labs   - Assess for incontinence   - Turn and reposition patient  - Assist with mobility/ambulation  - Relieve pressure over bony prominences  - Avoid friction and shearing  - Provide appropriate hygiene as needed including keeping skin clean and dry  - Evaluate need for skin moisturizer/barrier cream  - Collaborate with interdisciplinary team   - Patient/family teaching  - Consider wound care consult   Outcome: Progressing     Problem: Nutrition/Hydration-ADULT  Goal: Nutrient/Hydration intake appropriate for improving, restoring or maintaining nutritional needs  Description: Monitor and assess patient's nutrition/hydration status for malnutrition. Collaborate with interdisciplinary team and initiate plan and interventions as ordered.  Monitor  patient's weight and dietary intake as ordered or per policy. Utilize nutrition screening tool and intervene as necessary. Determine patient's food preferences and provide high-protein, high-caloric foods as appropriate.     INTERVENTIONS:  - Monitor oral intake, urinary output, labs, and treatment plans  - Assess nutrition and hydration status and recommend course of action  - Evaluate amount of meals eaten  - Assist patient with eating if necessary   - Allow adequate time for meals  - Recommend/ encourage appropriate diets, oral nutritional supplements, and vitamin/mineral supplements  - Order, calculate, and assess calorie counts as needed  - Recommend, monitor, and adjust tube feedings and TPN/PPN based on assessed needs  - Assess need for intravenous fluids  - Provide specific nutrition/hydration education as appropriate  - Include patient/family/caregiver in decisions related to nutrition  Outcome: Progressing

## 2024-09-18 NOTE — RESTORATIVE TECHNICIAN NOTE
Restorative Technician Note      Patient Name: Patsy Juarezbora     Restorative Tech Visit Date: 09/18/24  Note Type: Mobility  Patient Position Upon Consult: Supine  Activity Performed: Ambulated; Range of motion  Assistive Device: Other (Comment) (Rollator)  Patient Position at End of Consult: All needs within reach; Bedside chair

## 2024-09-18 NOTE — CONSULTS
e-Consult (IPC)  - Interventional Radiology  Patsy Dallas 83 y.o. female MRN: [id number]  Unit/Bed#: E2 -01 Encounter: [id number]          Interventional Radiology has been consulted to evaluate Patsy Dallas    We were consulted concerning this patient with right thigh collection and right posterior pelvis collection.    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 Patient found to have ischemia of the right big toe suspected to be secondary to thrombus rather than atherosclerotic etiology. Of note, patient's uretero-iliac  stent and PCN have been handled by [facility] on the end of July.  Patient also had CTA with runoff completed during workup here, which demonstrated ill-defined right retroperitoneal collection in the region of retroperitoneal clips and right upper thigh collection. IR consulted to evaluate collections for possible aspiration.    -Case discussed with GABRIEL Infante and primary team  -After review of imaging, the right thigh collection is accessible for aspiration  -Plan for IR aspiration of right thigh collection, tentatively today, pending IR schedule  -Patient does not need to be NPO          -The retroperitoneal collection (images below) is concerning given it contains air and it's position adjacent to the stent. This could indicate a fistula to a visceral structure. It is less likely a sequela of surgery and more likely a chronic collection. There is also noted to be an air bubble in the bladder. The collection is not  accessible for aspiration or drainage. See attending attestation for further discussion of findings.  -Consideration for discussion with [facility] regarding patient given recent intervention by their team  -Appreciate ID recommendations  -Remainder of care per primary team                   21-30 minutes, >50% of the total time devoted to medical consultative verbal/EMR discussion between providers. Written report will be generated in the EMR.     Thank you for allowing Interventional Radiology to participate in the care of Patsy Dallas. Please don't hesitate to call or TigerText us with any questions.     Mini Montgomery PA-C

## 2024-09-18 NOTE — ASSESSMENT & PLAN NOTE
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  CTA chest abdomen pelvis and echocardiogram without source.   Previously on warfarin for thromboembolism.  Vascular and hematology recommended anticoagulation.  Will start heparin infusion.  Discussion: Discussion today with ID and IR.  Gas in the area of retroperitoneal  ill-defined fluid collection is noted around surgical clips and this could be source of embolism.  Will try to discuss with [facility] for continuity

## 2024-09-18 NOTE — CONSULTS
Consultation -  [facility]  Patient: Patsy Dallas 83 y.o. female   MRN: [id number]  PCP: Harvey Monteiro Jr., MD  Unit/Bed#: E2 -01 Encounter: [id number]  Length of Stay: 3 days        Inpatient consult to gastroenterology     Date/Time  9/15/2024 4:52 PM     Performed by  Josué Freedman MD   Authorized by  Fernando Pacheco DO           Assessment/Plan:  Patsy Dallas is a 83 y.o. female with a PMH of metastatic cervical ca s/p chemorad w/ mets to lung s/p resection, sigmoid perf sigmoidectomy 2012 w/ LLQ ostomy who presents w/ R big toe pain.    # chronic normocytic anemia  # intermittent melena   - baseline Hb appears to be 7-9s, which she remains at currently.    - she has iron deficiency based on her 08/12/24 labs   - check B12, folate and retic count. Anticipate some myelosuppression while septic.   - c/t monitor Hb and BMs   - start standing bowel regimen given stool burden on CT. She prefers to not have miralax.   - outpatient f/u already scheduled for 10/08. Repeat EGD will likely be of low yield. Maybe consider VCE if she wishes to be aggressive in determining the source of her melena, but given her age and comorbidities, risks > benefits.   - we will follow her peripherally    History of Present Illness:  Patsy Dallas is a 83 y.o. female with a PMH of metastatic cervical ca s/p chemorad w/ mets to lung s/p resection, sigmoid perf sigmoidectomy 2012 w/ LLQ ostomy who presents w/ R big toe pain.    She presents with discoloration of her R big toe and RLE pain. No GI complaints over the past week. Appetite was good. Having brown ostomy output.    05/07/24 colonoscopy thru colostomy and rectal stump: normal colon. Retained stool in rectum. No pathology    05/04/24 EGD: normal eso. Small HG3 HH. Normal stomach and duo. No blood.    She has had an extensive GI history. Had a colonic obstruction in 2012 that led to perforation and eventual sigmoidectomy w/ colostomy. She reports  having multiple colonoscopies in the past few years for bleeding and a cecal polyp that was eventually resected. She had an admission at [facility] in 05/2024 for bleeding. EGD/colon were unremarkable at the time.    She reports having intermittent melena. Does not take pepto, iron or NSAIDs. No smoking or drinking.    She also reports having a longstanding Hx of cramping abd pain, often triggered by her belligerent . Symptoms resolved when he was diagnosed w/ cholangioca and stopped drinking. Suspect IBS.    GI HPI NEGATIVES:  Denies any recent N/V/D/C, hematemesis, heartburn, dysphagia, odynophagia, abdominal pain, hematochezia, melena, stool incontinence, jaundice, pruritis, anorexia, weight loss, weakness, or fatigue.    REVIEW OF SYSTEMS:  Otherwise, pt denies any fevers/chills, lightheadedness, dizziness, CP, SOB, urinary symptoms, weakness/numbness/tingling.    Past Medical History:   Diagnosis Date    Cervical cancer (HCC) 01/16/2011    History of chemotherapy     age 70    History of DVT (deep vein thrombosis)     History of radiation therapy     age 70     Past Surgical History:   Procedure Laterality Date    BREAST EXCISIONAL BIOPSY Left 1985    benign    HYSTERECTOMY      age69    OOPHORECTOMY Bilateral     age 70     Prior to Admission medications    Medication Sig Start Date End Date Taking? Authorizing Provider   amoxicillin (AMOXIL) 500 mg capsule Take 500 mg by mouth every 12 (twelve) hours   Yes Historical Provider, MD   docusate sodium (COLACE) 50 mg capsule Take 1 capsule (50 mg total) by mouth 2 (two) times a day as needed for constipation 12/8/22  Yes Eboni Leary MD   lisinopril (ZESTRIL) 2.5 mg tablet Take 2.5 mg by mouth daily 2/12/18  Yes Historical Provider, MD   acetaminophen (TYLENOL) 650 mg CR tablet Take 650 mg by mouth every 8 (eight) hours as needed      Historical Provider, MD   ascorbic acid (VITAMIN C) 1000 MG tablet Take 1,000 mg by mouth 3/26/14   Historical Provider, MD    aspirin (ECOTRIN LOW STRENGTH) 81 mg EC tablet Take by mouth 12/1/17   Historical Provider, MD   ASPIRIN 81 PO Take 1 tablet every day by oral route.    Historical Provider, MD   BD PosiFlush 0.9 % SOLN  9/13/24   Historical Provider, MD   Calcium Polycarbophil (Fiber) 625 MG TABS Take by mouth daily    Historical Provider, MD   CALCIUM-MAGNESIUM-VITAMIN D PO Take by mouth    Historical Provider, MD   cefpodoxime (VANTIN) 200 mg tablet take 1 tablet by mouth 2 times a day for 7 days 9/6/24   Historical Provider, MD   Cholecalciferol (VITAMIN D3) 2000 units capsule Take 1 tablet by mouth continuous as needed      Historical Provider, MD   cycloSPORINE (Restasis) 0.05 % ophthalmic emulsion Administer 1 drop to both eyes every 12 (twelve) hours      Historical Provider, MD   DAILY MULTIPLE VITAMINS PO Take 1 tablet by mouth daily    Historical Provider, MD   diazepam (VALIUM) 5 mg tablet Take 2.5 mg by mouth every 6 (six) hours as needed for anxiety    Historical Provider, MD   dicyclomine (BENTYL) 10 mg capsule TAKE 1 CAPSULE BY MOUTH 3 TIMES A DAY AS NEEDED FOR CRAMPING 7/8/24   Historical Provider, MD   famotidine (PEPCID) 20 mg tablet Take 20 mg by mouth daily   10/20/21   Historical Provider, MD   fluticasone (FLONASE) 50 mcg/act nasal spray 2 sprays into each nostril as needed    Historical Provider, MD   Garlic 100 MG TABS Take by mouth    Historical Provider, MD   Glutathione POWD Take by mouth daily      Historical Provider, MD   guaiFENesin (MUCINEX) 600 mg 12 hr tablet Take 1,200 mg by mouth    Historical Provider, MD   METRONIDAZOLE, TOPICAL, 0.75 % LOTN metronidazole 0.75 % lotion   APPLY 1 2 TIMES DAILY TO FACE 90 DAY SUPPLY    Historical Provider, MD   Omega-3 Fatty Acids (FISH OIL) 1,000 mg Take 1,000 mg by mouth daily    Historical Provider, MD   omeprazole (PriLOSEC) 20 mg delayed release capsule Take 20 mg by mouth    Historical Provider, MD   ondansetron (ZOFRAN) 4 mg tablet Take 1 tablet (4 mg  total) by mouth every 8 (eight) hours as needed for nausea or vomiting 1/17/23   Eboni Leary MD   Probiotic Product (PROBIOTIC-10) CAPS Take by mouth daily    Historical Provider, MD   sodium chloride (OCEAN) 0.65 % nasal spray 1 spray into each nostril daily    Historical Provider, MD   Specialty Vitamins Products (ONE-A-DAY BONE STRENGTH PO) Take 3 capsules by mouth daily      Historical Provider, MD   SYNTHROID 50 MCG tablet Take 50 mcg by mouth daily   3/23/18   Historical Provider, MD   TURMERIC CURCUMIN PO Take by mouth    Historical Provider, MD   Ubiquinol 100 MG CAPS Take 1 capsule by mouth daily   7/26/10   Historical Provider, MD     Allergies   Allergen Reactions    Iodinated Contrast Media Diarrhea     Severe! PO oral contrast    Severe    Iron Sucrose Anaphylaxis, Other (See Comments) and Shortness Of Breath     Specifically venafor brand; labored breathing, chest discomfort, weakness, facial flush. Therahem is ok    Specifically Venofer brand; labored breathing, chest discomfort, weakness, facial flush.     Therahem is ok    Bee Pollen Allergic Rhinitis    Ibuprofen GI Bleeding    Misc. Sulfonamide Containing Compounds Hives    Wound Dressing Adhesive Other (See Comments)     Blisters    Medical Tape Itching, Rash and Other (See Comments)    Other Itching, Other (See Comments), Diarrhea and Hives     BAND AIDES RASH    Blisters    Oral CT contrast; tolerates IV contrast    Oxybutynin Rash and Other (See Comments)    Pollen Extract Allergic Rhinitis and Other (See Comments)    Sulfa Antibiotics Rash and Other (See Comments)    Sulfur Rash    Wound Dressings Rash     BAND AIDS RASH AND BLISTERS     Social History     Tobacco Use    Smoking status: Never    Smokeless tobacco: Never   Substance Use Topics    Alcohol use: No    Drug use: No     Family History   Problem Relation Age of Onset    No Known Problems Mother     No Known Problems Father     No Known Problems Daughter     No Known Problems  "Maternal Grandmother     No Known Problems Maternal Grandfather     No Known Problems Paternal Grandmother     No Known Problems Paternal Grandfather        Objective:  /64 (BP Location: Left arm)   Pulse 82   Temp 98.6 °F (37 °C) (Temporal)   Resp 16   Ht 5' 1\" (1.549 m)   Wt 57.6 kg (127 lb)   SpO2 100%   BMI 24.00 kg/m²   No intake or output data in the 24 hours ending 09/18/24 0829  Body mass index is 24 kg/m².  Physical Exam  Constitutional:       General: She is not in acute distress.     Appearance: Normal appearance.   HENT:      Head: Normocephalic and atraumatic.      Nose: Nose normal.      Mouth/Throat:      Mouth: Mucous membranes are moist.   Eyes:      General: No scleral icterus.     Extraocular Movements: Extraocular movements intact.      Conjunctiva/sclera: Conjunctivae normal.      Pupils: Pupils are equal, round, and reactive to light.   Cardiovascular:      Rate and Rhythm: Normal rate and regular rhythm.   Pulmonary:      Effort: Pulmonary effort is normal.   Abdominal:      General: Abdomen is flat. There is no distension.      Palpations: There is no mass.      Tenderness: There is no abdominal tenderness.      Comments: LLQ colostomy   Musculoskeletal:         General: No swelling. Normal range of motion.   Skin:     General: Skin is warm and dry.      Capillary Refill: Capillary refill takes less than 2 seconds.      Comments: R big toe blue   Neurological:      General: No focal deficit present.      Mental Status: She is alert.   Psychiatric:         Mood and Affect: Mood normal.         Labs:  I have reviewed all available labs and imaging results in Epic.  Results from last 7 days   Lab Units 09/18/24  0428 09/17/24  0448 09/16/24  0434   SODIUM mmol/L 134* 136 136   POTASSIUM mmol/L 3.7 3.8 3.4*   CHLORIDE mmol/L 99 101 102   CO2 mmol/L 27 28 28   BUN mg/dL 16 15 16   CREATININE mg/dL 0.78 0.76 0.67   GLUCOSE RANDOM mg/dL 136 96 118   CALCIUM mg/dL 9.0 8.9 8.9   ALBUMIN " g/dL  --  2.8* 2.9*   ALK PHOS U/L  --  164* 209*   ALT U/L  --  22 27   AST U/L  --  24 29   EGFR ml/min/1.73sq m 69 71 80     Results from last 7 days   Lab Units 09/18/24  0428 09/17/24  0448 09/16/24  0434 09/15/24  1755   WBC Thousand/uL 9.31 6.51 7.39 8.56   HEMOGLOBIN g/dL 9.2* 8.6* 8.7* 10.0*   HEMATOCRIT % 29.2* 27.2* 27.5* 32.4*   PLATELETS Thousands/uL 335 323 313 296   SEGS PCT %  --   --  81* 83*   LYMPHO PCT %  --   --  10* 8*   MONO PCT %  --   --  8 7   EOS PCT %  --   --  1 1   BASOS PCT %  --   --  0 0   TOTAL NEUT ABS Thousands/µL  --   --  5.96 7.15   LYMPHS ABS Thousands/µL  --   --  0.74 0.69   MONOS ABS Thousand/µL  --   --  0.58 0.61   EOS ABS Thousand/µL  --   --  0.06 0.04   BASOS ABS Thousands/µL  --   --  0.02 0.03     Results from last 7 days   Lab Units 09/18/24  0309 09/17/24  1949 09/16/24  1510 09/15/24  1755   PROTIME seconds  --   --  14.1 15.3*   INR   --   --  1.07 1.20*   PTT seconds 77* 90* 18* 31           Additional Labs:                 Imaging:  VAS ARTERIAL DUPLEX- LOWER LIMB BILATERAL   Final Result by Rose Ureña MD (09/17 2131)      CTA chest pe study   Final Result by Benoit Lutz MD (09/17 0850)      1.  No identifiable acute abnormality to account for the patient's clinical presentation. No evidence of a pulmonary embolus.   2.  Mild to moderate aortic atherosclerotic disease, without evidence of aneurysm formation or ulcerating plaques.   3.  Mild diffuse nonspecific subcutaneous edema.                  Workstation performed: [id number]         CTA abdominal w run off w wo contrast   ED Interpretation by Aminata Mejia DO (09/15 2231)   VRAD:  1.) Although there is occlusion versus congenital absence of the native proximal right BRONSON, the distal right BRONSON appears to have good collateral flow and good distal coverage of the ankle and foot.   2.) There is reasonably good flow of the left infrapopliteal arteries.  3.) Right external iliac artery stent is noted  and appears patent.  4.) No other large vessel stenosis or occlusion.  5.) Severe hepatic steatosis.  6.) There is air in the bladder. Correlate clinically for iatrogenic cause versus infection or fistula.  7.) Right hip adductor intramuscular rim enhancing fluid collection. Correlate clinically for abscess versus other chronic process.      Final Result by Jose Daniel Yadav MD (09/16 9399)      1. No evidence of macro embolic disease or other acute arterial process. In the right leg, no significant proximal occlusive disease. There is moderate infrapopliteal disease with single vessel peroneal runoff.   2. No significant aortoiliac or left infrainguinal occlusive disease   3. No culprit lesion seen that would account for thromboembolic disease   4. 2.5 cm collection medial upper right thigh musculature with some peripheral enhancement. Differential includes chronic seroma, evolving hematoma, or abscess in the proper clinical setting.   5. Ill-defined fluid collection in the right retroperitoneum which may be intramuscular adjacent to several retroperitoneal clips. This could represent chronic postop collection. Again, in the proper clinical setting cannot exclude acute infectious    process. If there is any clinical concerns for systemic infection, recommend repeat abdomen pelvis CT with IV and oral contrast.      Workstation performed: [id number]         CT head without contrast   Final Result by Jose Cruz Erickson DO (09/15 2146)      No acute intracranial abnormality.  Chronic microangiopathic changes.                  Workstation performed: [id number]             Medications:   Current Facility-Administered Medications   Medication Dose Route Frequency Provider Last Rate    acetaminophen  975 mg Oral Q6H PRN ARNOLD Lama      aluminum-magnesium hydroxide-simethicone  30 mL Oral Q6H PRN ARNOLD Lama      amoxicillin  500 mg Oral Q12H [facility] ARNOLD Lama      ascorbic acid  500 mg  Oral Daily ARNOLD Lama      Cholecalciferol  1,000 Units Oral Daily ARNOLD Lama      diazepam  2.5 mg Oral Q6H PRN ARNOLD Lama      dicyclomine  10 mg Oral TID PRN ARNOLD Lama      docusate sodium  100 mg Oral BID ARNOLD Lama      heparin (porcine)  3-30 Units/kg/hr (Order-Specific) Intravenous Titrated Fernando Junior, DO 18 Units/kg/hr (09/18/24 0718)    heparin (porcine)  2,200 Units Intravenous Q6H PRN Fernando Junior, DO      heparin (porcine)  4,400 Units Intravenous Q6H PRN Fernando Junior, DO      HYDROmorphone  0.2 mg Intravenous Q4H PRN ARNOLD Lama      levothyroxine  50 mcg Oral Early Morning ARNOLD Lama      lisinopril  2.5 mg Oral Daily ARNOLD Lama      morphine  15 mg Oral Q6H PRN ARNOLD Lama      multivitamin stress formula  1 tablet Oral Daily ARNOLD Lama      ondansetron  4 mg Oral Q6H PRN ARNLOD Lama      saccharomyces boulardii  250 mg Oral BID ARNOLD Lama         Problem List:  Patient Active Problem List   Diagnosis    History of DVT (deep vein thrombosis)    History of cervical cancer    Diffuse large B cell lymphoma (HCC)    Nontoxic single thyroid nodule    Abdominal pain of multiple sites    Chemotherapy follow-up examination    Compression fracture of third lumbar vertebra with routine healing    Depression    Edema of left lower extremity    Benign essential hypertension    Hypothyroidism    Lymphedema    Nodule of left lung    Other diseases of lung, not elsewhere classified    Renal insufficiency    Encounter for follow-up surveillance of cervical cancer    Metastasis from cervical cancer (HCC)    Inflammatory bowel disease    Radiation enteritis    Gross hematuria    Encounter for screening mammogram for breast cancer    Possible Embolus (HCC)    Ischemia of toe    Nephrostomy tube bleed (HCC)    Chronic anemia    Iliac artery bleed    H/O  colectomy    Abnormal CT scan       Case discussed with attending physician Dr. Das. All recs are preliminary pending final attestation.    Josué Freedman, PGY-5

## 2024-09-18 NOTE — PROGRESS NOTES
Progress Note - Hospitalist   Name: Patsy Dallas 83 y.o. 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  CTA chest abdomen pelvis and echocardiogram without source.   Previously on warfarin for thromboembolism.  Vascular and hematology recommended anticoagulation.  Will start heparin infusion.  Discussion: Discussion today with ID and IR.  Gas in the area of retroperitoneal  ill-defined fluid collection is noted around surgical clips and this could be source of embolism.  Will try to discuss with [facility] for continuity  Abnormal CT scan  Patient with ongoing chills and rigors.  CT with right retroperitoneal fluid collection as well as medial thigh collection with enhancement  Right retroperitoneal fluid collection could be cause/source of embolism.  Blood cultures negative  IR to evaluate medial thigh collection  Nephrostomy tube bleed (HCC)  Hx of R ureteral stricture s/p stent c/b pyelonephritis and hydronephrosis  Has persistent bleeding from right nephrostomy, underwent clot evacuation and conversion of right PCN to PCNU and TURBT. 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 Follows with ID at [facility]  History of cervical cancer  History of recurrent metastatic squamous cervical cancer s/p hysterectomy; metastasis to lung with resection (2014) and SBRT (2018).  Hypothyroidism  Continue levothyroxine 50 mcg  Chronic anemia  Extensive history of  hematuria/bleeding with ureteral iliac fistula  Due to worsening anemia checked hemoccult which was positive.   GI did not have any immediate recommendations for endoscopic evaluation    Results from last 7 days   Lab Units 24  0428 24  0448 24  0434 09/15/24  1755   HEMOGLOBIN g/dL 9.2* 8.6* 8.7* 10.0*     Iliac artery bleed  Hx of uretero-iliac fistula S/p emergent R external iliac artery stent grafting 2024 by vascular surgery at [facility]  CTA showing: Right external iliac artery stent is noted and appears patent  Follows with vascular surgery at [facility]  H/O colectomy  History of SBO s/p colectomy. Ileostomy ()     VTE Pharmacologic Prophylaxis: VTE Score: 6 High Risk (Score >/= 5) - Pharmacological DVT Prophylaxis Ordered: heparin drip. Sequential Compression Devices Ordered.    Mobility:   Basic Mobility Inpatient Raw Score: 22  JH-HLM Goal: 7: Walk 25 feet or more  JH-HLM Achieved: 1: Laying in bed  JH-HLM Goal NOT achieved. Continue with multidisciplinary rounding and encourage appropriate mobility to improve upon JH-HLM goals.    Patient Centered Rounds: I have performed bedside rounds with nursing staff today.  Discussions with Specialists or Other Care Team Provider: ID IR and case management    Education and Discussions with Family / Patient: Updated  (son and daughter) at bedside.    Current Length of Stay: 3 day(s)  Current Patient Status: Inpatient   Certification Statement: The patient will continue to require additional inpatient hospital stay due to IR evaluation of collection, discussion with [facility]  Discharge Plan: To be determined    Code Status: Level 1 - Full Code    Subjective   Patient seen and examined.  No new complaints.  More awake today.    Objective     Vitals:   Temp (24hrs), Av.5 °F (36.9 °C), Min:97.6 °F (36.4 °C), Max:99.4 °F (37.4 °C)    Temp:  [97.6 °F (36.4 °C)-99.4 °F (37.4 °C)] 99.3 °F (37.4 °C)  HR:  [79-96] 79  Resp:  [16-18] 18  BP:  (108-146)/(56-73) 138/62  SpO2:  [94 %-100 %] 100 %  Body mass index is 24 kg/m².     Input and Output Summary (last 24 hours):     Intake/Output Summary (Last 24 hours) at 9/18/2024 1506  Last data filed at 9/18/2024 1101  Gross per 24 hour   Intake --   Output 300 ml   Net -300 ml       Physical Exam  Vitals reviewed.   Constitutional:       General: She is not in acute distress.     Appearance: Normal appearance.   HENT:      Head: Atraumatic.   Cardiovascular:      Rate and Rhythm: Regular rhythm.   Pulmonary:      Breath sounds: Decreased breath sounds present. No wheezing.   Abdominal:      General: Bowel sounds are normal.      Palpations: Abdomen is soft.      Tenderness: There is no guarding.      Comments: Ostomy present   Musculoskeletal:         General: No swelling.   Skin:     General: Skin is warm.   Neurological:      General: No focal deficit present.      Mental Status: She is alert and oriented to person, place, and time.      Motor: No weakness.   Psychiatric:         Mood and Affect: Mood normal.       Lines/Drains:  Lines/Drains/Airways       Active Status       Name Placement date Placement time Site Days    Nephrostomy Retrograde/ Ileal Conduit Right 09/16/24  1327  Right  2    Colostomy LLQ 03/10/21  1326  LLQ  1288                      Telemetry:  Telemetry Orders (From admission, onward)               24 Hour Telemetry Monitoring  Continuous x 24 Hours (Telem)        Question:  Reason for 24 Hour Telemetry  Answer:  Arrhythmias requiring acute medical intervention / PPM or ICD malfunction                     Telemetry Reviewed:   Indication for Continued Telemetry Use:                Lab Results: I have reviewed the following results:    Results from last 7 days   Lab Units 09/18/24  0428 09/17/24  0448 09/16/24  1510 09/16/24  0434 09/15/24  1755   WBC Thousand/uL 9.31 6.51  --  7.39 8.56   HEMOGLOBIN g/dL 9.2* 8.6*  --  8.7* 10.0*   PLATELETS Thousands/uL 335 323  --  313 296   MCV fL 90  89  --  88 89   INR   --   --  1.07  --  1.20*     Results from last 7 days   Lab Units 09/18/24  0428 09/17/24  0448 09/16/24  0434   SODIUM mmol/L 134* 136 136   POTASSIUM mmol/L 3.7 3.8 3.4*   CHLORIDE mmol/L 99 101 102   CO2 mmol/L 27 28 28   ANION GAP mmol/L 8 7 6   BUN mg/dL 16 15 16   CREATININE mg/dL 0.78 0.76 0.67   CALCIUM mg/dL 9.0 8.9 8.9   ALBUMIN g/dL  --  2.8* 2.9*   TOTAL BILIRUBIN mg/dL  --  0.26 0.27   ALK PHOS U/L  --  164* 209*   ALT U/L  --  22 27   AST U/L  --  24 29   EGFR ml/min/1.73sq m 69 71 80   GLUCOSE RANDOM mg/dL 136 96 118                      Results from last 7 days   Lab Units 09/18/24  0428   PROCALCITONIN ng/ml 0.92*                   Recent Cultures (last 7 days):   Results from last 7 days   Lab Units 09/16/24  1519 09/16/24  1509   BLOOD CULTURE  No Growth at 24 hrs. No Growth at 24 hrs.       Imaging:  Reviewed radiology reports from this admission including:  CTA chest pe study    Result Date: 9/17/2024  Impression: 1.  No identifiable acute abnormality to account for the patient's clinical presentation. No evidence of a pulmonary embolus. 2.  Mild to moderate aortic atherosclerotic disease, without evidence of aneurysm formation or ulcerating plaques. 3.  Mild diffuse nonspecific subcutaneous edema. Workstation performed: [id number]     CTA abdominal w run off w wo contrast    Result Date: 9/16/2024  Impression: 1. No evidence of macro embolic disease or other acute arterial process. In the right leg, no significant proximal occlusive disease. There is moderate infrapopliteal disease with single vessel peroneal runoff. 2. No significant aortoiliac or left infrainguinal occlusive disease 3. No culprit lesion seen that would account for thromboembolic disease 4. 2.5 cm collection medial upper right thigh musculature with some peripheral enhancement. Differential includes chronic seroma, evolving hematoma, or abscess in the proper clinical setting. 5. Ill-defined fluid  collection in the right retroperitoneum which may be intramuscular adjacent to several retroperitoneal clips. This could represent chronic postop collection. Again, in the proper clinical setting cannot exclude acute infectious process. If there is any clinical concerns for systemic infection, recommend repeat abdomen pelvis CT with IV and oral contrast. Workstation performed: [id number]     CT head without contrast    Result Date: 9/15/2024  Impression: No acute intracranial abnormality.  Chronic microangiopathic changes. Workstation performed: [id number]       Last 24 Hours Medication List:     Current Facility-Administered Medications:     acetaminophen (TYLENOL) tablet 975 mg, Q6H PRN    aluminum-magnesium hydroxide-simethicone (MAALOX) oral suspension 30 mL, Q6H PRN    amoxicillin (AMOXIL) capsule 500 mg, Q12H KASSIDY    ascorbic acid (VITAMIN C) tablet 500 mg, Daily    Cholecalciferol (VITAMIN D3) tablet 1,000 Units, Daily    diazepam (VALIUM) tablet 2.5 mg, Q6H PRN    dicyclomine (BENTYL) capsule 10 mg, TID PRN    docusate sodium (COLACE) capsule 100 mg, BID    heparin (porcine) 25,000 units in 0.45% NaCl 250 mL infusion (premix), Titrated, Last Rate: 18 Units/kg/hr (09/18/24 1313)    heparin (porcine) injection 2,200 Units, Q6H PRN    heparin (porcine) injection 4,400 Units, Q6H PRN    HYDROmorphone HCl (DILAUDID) injection 0.2 mg, Q4H PRN    levothyroxine tablet 50 mcg, Early Morning    lisinopril (ZESTRIL) tablet 2.5 mg, Daily    morphine (MSIR) IR tablet 15 mg, Q6H PRN    multivitamin stress formula tablet 1 tablet, Daily    ondansetron (ZOFRAN-ODT) dispersible tablet 4 mg, Q6H PRN    polyethylene glycol (MIRALAX) packet 17 g, BID    saccharomyces boulardii (FLORASTOR) capsule 250 mg, BID    Administrative Statements   Today, Patient Was Seen By: Fernando Pacheco, DO  I have spent a total time of 50 minutes in caring for this patient on the day of the visit/encounter including Diagnostic results, Prognosis,  Risks and benefits of tx options, Instructions for management, Patient and family education, Impressions, Counseling / Coordination of care, Documenting in the medical record, Reviewing / ordering tests, medicine, procedures  , Obtaining or reviewing history  , and Communicating with other healthcare professionals .    **Please Note: This note may have been constructed using a voice recognition system.**

## 2024-09-19 LAB
ANION GAP SERPL CALCULATED.3IONS-SCNC: 8 MMOL/L (ref 4–13)
APTT PPP: 62 SECONDS (ref 23–34)
BUN SERPL-MCNC: 17 MG/DL (ref 5–25)
CALCIUM SERPL-MCNC: 9.7 MG/DL (ref 8.4–10.2)
CHLORIDE SERPL-SCNC: 97 MMOL/L (ref 96–108)
CO2 SERPL-SCNC: 29 MMOL/L (ref 21–32)
CREAT SERPL-MCNC: 0.76 MG/DL (ref 0.6–1.3)
ERYTHROCYTE [DISTWIDTH] IN BLOOD BY AUTOMATED COUNT: 17.4 % (ref 11.6–15.1)
FERRITIN SERPL-MCNC: 934 NG/ML (ref 11–307)
GFR SERPL CREATININE-BSD FRML MDRD: 71 ML/MIN/1.73SQ M
GLUCOSE SERPL-MCNC: 124 MG/DL (ref 65–140)
HCT VFR BLD AUTO: 30.8 % (ref 34.8–46.1)
HGB BLD-MCNC: 9.9 G/DL (ref 11.5–15.4)
IRON SATN MFR SERPL: 9 % (ref 15–50)
IRON SERPL-MCNC: 14 UG/DL (ref 50–212)
MCH RBC QN AUTO: 28.9 PG (ref 26.8–34.3)
MCHC RBC AUTO-ENTMCNC: 32.1 G/DL (ref 31.4–37.4)
MCV RBC AUTO: 90 FL (ref 82–98)
PLATELET # BLD AUTO: 405 THOUSANDS/UL (ref 149–390)
PMV BLD AUTO: 9 FL (ref 8.9–12.7)
POTASSIUM SERPL-SCNC: 3.8 MMOL/L (ref 3.5–5.3)
RBC # BLD AUTO: 3.42 MILLION/UL (ref 3.81–5.12)
SODIUM SERPL-SCNC: 134 MMOL/L (ref 135–147)
TIBC SERPL-MCNC: 160 UG/DL (ref 250–450)
UIBC SERPL-MCNC: 146 UG/DL (ref 155–355)
WBC # BLD AUTO: 11.07 THOUSAND/UL (ref 4.31–10.16)
WNV IGG SER QL IA: NEGATIVE
WNV IGM SER QL IA: NEGATIVE

## 2024-09-19 PROCEDURE — 83540 ASSAY OF IRON: CPT | Performed by: INTERNAL MEDICINE

## 2024-09-19 PROCEDURE — 82728 ASSAY OF FERRITIN: CPT | Performed by: INTERNAL MEDICINE

## 2024-09-19 PROCEDURE — 99233 SBSQ HOSP IP/OBS HIGH 50: CPT | Performed by: INTERNAL MEDICINE

## 2024-09-19 PROCEDURE — 99232 SBSQ HOSP IP/OBS MODERATE 35: CPT | Performed by: INTERNAL MEDICINE

## 2024-09-19 PROCEDURE — 99232 SBSQ HOSP IP/OBS MODERATE 35: CPT | Performed by: NURSE PRACTITIONER

## 2024-09-19 PROCEDURE — 85730 THROMBOPLASTIN TIME PARTIAL: CPT | Performed by: INTERNAL MEDICINE

## 2024-09-19 PROCEDURE — 87040 BLOOD CULTURE FOR BACTERIA: CPT | Performed by: INTERNAL MEDICINE

## 2024-09-19 PROCEDURE — 85027 COMPLETE CBC AUTOMATED: CPT | Performed by: INTERNAL MEDICINE

## 2024-09-19 PROCEDURE — 80048 BASIC METABOLIC PNL TOTAL CA: CPT | Performed by: INTERNAL MEDICINE

## 2024-09-19 PROCEDURE — 83550 IRON BINDING TEST: CPT | Performed by: INTERNAL MEDICINE

## 2024-09-19 PROCEDURE — 99222 1ST HOSP IP/OBS MODERATE 55: CPT | Performed by: UROLOGY

## 2024-09-19 RX ADMIN — AMOXICILLIN 500 MG: 500 CAPSULE ORAL at 21:06

## 2024-09-19 RX ADMIN — OXYCODONE HYDROCHLORIDE AND ACETAMINOPHEN 500 MG: 500 TABLET ORAL at 08:25

## 2024-09-19 RX ADMIN — DIAZEPAM 2.5 MG: 5 TABLET ORAL at 21:06

## 2024-09-19 RX ADMIN — Medication 1000 UNITS: at 08:25

## 2024-09-19 RX ADMIN — APIXABAN 10 MG: 5 TABLET, FILM COATED ORAL at 21:03

## 2024-09-19 RX ADMIN — LEVOTHYROXINE SODIUM 50 MCG: 50 TABLET ORAL at 05:24

## 2024-09-19 RX ADMIN — Medication 250 MG: at 17:20

## 2024-09-19 RX ADMIN — Medication 250 MG: at 08:25

## 2024-09-19 RX ADMIN — LISINOPRIL 2.5 MG: 5 TABLET ORAL at 08:25

## 2024-09-19 RX ADMIN — DOCUSATE SODIUM 100 MG: 100 CAPSULE, LIQUID FILLED ORAL at 17:20

## 2024-09-19 RX ADMIN — ACETAMINOPHEN 975 MG: 325 TABLET ORAL at 21:04

## 2024-09-19 RX ADMIN — B-COMPLEX W/ C & FOLIC ACID TAB 1 TABLET: TAB at 08:25

## 2024-09-19 RX ADMIN — AMOXICILLIN 500 MG: 500 CAPSULE ORAL at 08:25

## 2024-09-19 RX ADMIN — DOCUSATE SODIUM 100 MG: 100 CAPSULE, LIQUID FILLED ORAL at 08:25

## 2024-09-19 RX ADMIN — ACETAMINOPHEN 975 MG: 325 TABLET ORAL at 08:26

## 2024-09-19 NOTE — CASE MANAGEMENT
Case Management Assessment & Discharge Planning Note    Patient name Patsy Dallas  Location [facility] /E2 -* MRN [id number]  : 1940 Date 2024       Current Admission Date: 9/15/2024  Current Admission Diagnosis:Ischemia of toe   Patient Active Problem List    Diagnosis Date Noted Date Diagnosed    Abnormal CT scan 2024     Possible Embolus (HCC) 09/15/2024     Ischemia of toe 09/15/2024     Nephrostomy tube bleed (HCC) 09/15/2024     Chronic anemia 09/15/2024     Iliac artery bleed 09/15/2024     H/O colectomy 09/15/2024     Gross hematuria 2023     Encounter for screening mammogram for breast cancer 2023     Inflammatory bowel disease 2022     Radiation enteritis 2022     Metastasis from cervical cancer (HCC) 12/10/2021     Encounter for follow-up surveillance of cervical cancer 2019     History of cervical cancer 2018     Diffuse large B cell lymphoma (HCC) 2018     Nontoxic single thyroid nodule 2018     Nodule of left lung 2018     Renal insufficiency 2017     Compression fracture of third lumbar vertebra with routine healing 2017     History of DVT (deep vein thrombosis)      Edema of left lower extremity 10/18/2016     Abdominal pain of multiple sites 2015     Lymphedema 2015     Other diseases of lung, not elsewhere classified 10/23/2013     Hypothyroidism 2013     Chemotherapy follow-up examination 2013     Depression 2013     Benign essential hypertension 2010       LOS (days): 4  Geometric Mean LOS (GMLOS) (days): 1.8  Days to GMLOS:-1.7     OBJECTIVE:    Risk of Unplanned Readmission Score: 17.1         Current admission status: Inpatient       Preferred Pharmacy:   [id number] - [place] - [address]  [address]  [place] [address]  Phone: [phone] Fax: [phone]    Primary Care Provider: Harvey Monteiro Jr., MD    Primary Insurance:  MEDICARE  Secondary Insurance:     ASSESSMENT:  Active Health Care Proxies       Marc Dallas Health Care Representative - Child   Primary Phone: [phone] (Home)                 Advance Directives  Does patient have a Health Care POA?: Yes (Not in EHR)  Does patient have Advance Directives?: Yes (Not in EHR)  Advance Directives: Living will, Power of  for health care, Power of  for finance  Primary Contact: Marc Dallas (son) [phone] and Ann He (daughter) [phone]         Readmission Root Cause  30 Day Readmission: No    Patient Information  Admitted from:: Home  Mental Status: Alert  During Assessment patient was accompanied by: Daughter, Son  Assessment information provided by:: Patient, Son, Daughter  Primary Caregiver: Self  Support Systems: Spouse/significant other, Son, Daughter, Home care staff  County of Residence: [place]  What city do you live in[place]  Home entry access options. Select all that apply.: Stairs  Number of steps to enter home.: 2  Do the steps have railings?: Yes  Type of Current Residence: 2 story home  Upon entering residence, is there a bedroom on the main floor (no further steps)?: No  A bedroom is located on the following floor levels of residence (select all that apply):: 2nd Floor  Upon entering residence, is there a bathroom on the main floor (no further steps)?: No  Indicate which floors of current residence have a bathroom (select all the apply):: 2nd Floor  Number of steps to 2nd floor from main floor: One Flight (has stair glide)  Living Arrangements: Lives w/ Spouse/significant other  Is patient a ?: No    Activities of Daily Living Prior to Admission  Functional Status: Independent  Completes ADLs independently?: No  Level of ADL dependence: Assistance (Pt requires assistance with ADLs and IADLs which home care staff helps completed. Pt needs majority of support for dressing, bathing, and meal preperation. Spouse also  assists)  Ambulates independently?: No  Level of ambulatory dependence: Assistance  Does patient use assisted devices?: Yes  Assisted Devices (DME) used: Rollator, Walker, Stair Chair/Glide, Shower Chair  Does patient currently own DME?: Yes  What DME does the patient currently own?: Rollator, Shower Chair, Stair Chair/Glide, Walker  Does patient have a history of Outpatient Therapy (PT/OT)?: Yes  Does the patient have a history of Short-Term Rehab?: Yes ([facility])  Does patient have a history of HHC?: Yes  Does patient currently have HHC?: Yes    Current Home Health Care  Type of Current Home Care Services: Home health aide, Nurse visit  Current Home Health Agency:: Other (please enter name in comment) ([facility])  Current Home Health Follow-Up Provider:: PCP    Patient Information Continued  Income Source: SSI/SSD  Does patient have prescription coverage?: Yes  Does patient receive dialysis treatments?: No  Does patient have a history of substance abuse?: No  Does patient have a history of Mental Health Diagnosis?: No         Means of Transportation  Means of Transport to Appts:: Family transport      Social Determinants of Health (SDOH)      Flowsheet Row Most Recent Value   Housing Stability    In the last 12 months, was there a time when you were not able to pay the mortgage or rent on time? N   In the past 12 months, how many times have you moved where you were living? 0   At any time in the past 12 months, were you homeless or living in a shelter (including now)? N   Transportation Needs    In the past 12 months, has lack of transportation kept you from medical appointments or from getting medications? no   In the past 12 months, has lack of transportation kept you from meetings, work, or from getting things needed for daily living? No   Food Insecurity    Within the past 12 months, you worried that your food would run out before you got the money to buy more. Never true   Within the past 12 months, the food you  bought just didn't last and you didn't have money to get more. Never true   Utilities    In the past 12 months has the electric, gas, oil, or water company threatened to shut off services in your home? No            DISCHARGE DETAILS:    Discharge planning discussed with:: Patient, daughter, and son  Freedom of Choice: Yes  Comments - Freedom of Choice: Agreeable to THERESE with  Home Health Care  CM contacted family/caregiver?: Yes  Were Treatment Team discharge recommendations reviewed with patient/caregiver?: Yes  Did patient/caregiver verbalize understanding of patient care needs?: Yes       Contacts  Patient Contacts: Marc Juarezbora (son) / Ann He (daughter)  Relationship to Patient:: Family  Contact Method: In Person  Reason/Outcome: Discharge Planning, Referral    Requested Home Health Care         Is the patient interested in HHC at discharge?: Yes  Home Health Discipline requested:: Nursing, Home Health Aide, Occupational Therapy, Physical Therapy  Home Health Agency Name:: [facility]  HHA External Referral Reason (only applicable if external HHA name selected): Patient has established relationship with provider  Home Health Follow-Up Provider:: PCP  Home Health Services Needed:: Evaluate Functional Status and Safety, Strengthening/Theraputic Exercises to Improve Function, Wound/Ostomy Care  Homebound Criteria Met:: Requires the Assistance of Another Person for Safe Ambulation or to Leave the Home, Uses an Assist Device (i.e. cane, walker, etc)  Supporting Clincal Findings:: Fatigues Easliy in Short Distances, Limited Endurance    DME Referral Provided  Referral made for DME?: No    Other Referral/Resources/Interventions Provided:  Interventions: HHC  Referral Comments: Referrral placed via aidin         Treatment Team Recommendation: Home with Home Health Care  Discharge Destination Plan:: Home with Home Health Care

## 2024-09-19 NOTE — APP STUDENT NOTE
KATLIN STUDENT  Inpatient Progress Note for TRAINING ONLY  Not Part of Legal Medical Record       Progress Note - Patsy Dallas 83 y.o. female MRN: [id number]    Unit/Bed#: E2 -01 Encounter: [id number]      Assessment & Plan:  Ischemia of toe   Patient is 83 year old female with history of DVT, SBO with ileostomy, nephrostomy tube with ureteroiliac fistula, and cervical cancer admitted for ischemia of her right big toe.   Dx: H&P    Tx: Heparin drip   Pt Ed: Patient understands that at this time we will be managing the ischemia of the toe with a continuous heparin drip. Hematology recommended heparin rather than other forms of anticoagulation due to patients history of bleeding. Patient agreeable to begin heparin at this time. Patient understands that heparin can easily be reversed with protamine sulfate. This offers an advantage as heparin has the shortest half life in comparison to LMWH and DOACs. Patient's PTT is therapeutic at this time for heparin drip. Patient understands that upon discharge further anticoagulation options can be discussed. Discussed with infectious disease team of possibility of transfer to [facility] for continuity of care.   2. Abnormal CT scan   Dx: CT scan- medial thigh collection with enhancement   Tx: Rule out infectious causes, blood cultures   Pt Ed: Patient understands that at this time due to patient's chills and rigors it is necessary to further evaluate this soft tissue fluid collection. At 24 hours blood cultures show no growth. Patient denying rigors and chills today. Continue to monitor blood cultures. Re-evaluate at 48 hours for growth.   3. Nephrostomy tube bleed   Dx:  H&P   Tx: Monitor tube for bleeds while on anticoagulation. Continue amoxicillin due to stent placement.    Pt Ed: Patient understands that since she has been started on heparin drip it is important to monitor  nephrostomy tube closely for any acute bleeding as she is now at an increased risk. Patient also  "understands that she is to continue amoxicillin to reduce the risk of infections from her stent placement.   4. Hypothyroidism   Dx: H&P   Tx: continue levothyroxine 50 mcg   Pt Ed: Patient understands importance of continuing her medication to prevent her free T4 from dropping too low. Patient understands common symptoms of hypothyroidism include fatigue, constipation, cold intolerance, weight gain among others. Patient understands to take this medication on an empty stomach at least 1 hour prior to the intake of food.   5. Chronic anemia   Dx: H&P   Tx: Monitor Hgb. GI consult   Pt Ed: Patient understands that due to her history of bleeding it is important to monitor for any bleeding and monitor her hemoglobin levels while in the hospital. Since she has been started on a heparin drip it is also important to watch to ensure that it does not drop significantly indicating possible bleeding somewhere in the body. Latest hemoglobin is 9.2 which increased from yesterdays 8.6 value. Hemoccult as positive, so GI will evaluate further.     Subjective:   Patient seen and examined at bedside. Patient complaining of worsening pain of right big toe as well as middle of foot and knee pain. Admits to shaking and chills last night. Denying any complaints of abdominal pain, nausea, vomiting, diarrhea.     Objective:     Vitals: Blood pressure 146/77, pulse 92, temperature 99.4 °F (37.4 °C), temperature source Oral, resp. rate 16, height 5' 1\" (1.549 m), weight 57.6 kg (127 lb), SpO2 99%.,Body mass index is 24 kg/m².      Intake/Output Summary (Last 24 hours) at 9/19/2024 1000  Last data filed at 9/18/2024 2101  Gross per 24 hour   Intake --   Output 700 ml   Net -700 ml       Physical Exam: Lungs: diminished breath sounds  Heart: regular rate and rhythm, S1, S2 normal, no murmur, click, rub or gallop     Invasive Devices       Peripheral Intravenous Line  Duration             Peripheral IV 09/15/24 Left Antecubital 3 days    " "Peripheral IV 09/19/24 Right Antecubital <1 day              Drain  Duration             Colostomy LLQ 1288 days    Nephrostomy Retrograde/ Ileal Conduit Right 2 days                    Lab, Imaging and other studies: {Results Review Statement:54985::\"No pertinent imaging studies reviewed.\"}  VTE Pharmacologic Prophylaxis: Heparin  VTE Mechanical Prophylaxis: sequential compression device    "

## 2024-09-19 NOTE — PROGRESS NOTES
Progress Note - Hospitalist   Name: Patsy Dallas 83 y.o. 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  CTA chest abdomen pelvis and echocardiogram without source.   Previously on warfarin for thromboembolism.  Vascular and hematology recommended anticoagulation.  Has been started on heparin infusion.  Discussion with ID and IR.  Gas in the area of retroperitoneal  ill-defined fluid collection is noted around surgical clips and this could be source of embolism.    I personally discussed with the patient's vascular surgery service at [facility]: Dr. Kofi Kee.  Nonsurgical candidate for any sort of stent removal or intervention.  Recommendation was to continue with Dawson and suppressive antibiotics.  Disposition: Palliative approach.  Transition heparin infusion to eliquis.  Abnormal CT scan  Patient with ongoing chills and rigors.  CT with right retroperitoneal fluid collection as well as medial thigh collection with enhancement  Right retroperitoneal fluid collection could be cause/source of embolism.  Blood cultures negative  IR aspirated medial thigh collection with low concerns for infection.  Nephrostomy tube bleed (HCC)  Hx of R ureteral stricture s/p stent c/b pyelonephritis and hydronephrosis  Has persistent bleeding from right nephrostomy, underwent clot evacuation and conversion of right PCN to PCNU and TURBT. 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 Follows with ID at [facility]  History of cervical cancer  History of recurrent metastatic squamous cervical cancer s/p hysterectomy; metastasis to lung with resection (2014) and SBRT (2018).  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 Ileostomy (2012)   Benign essential hypertension  Lisinopril 2.5 mg daily    VTE Pharmacologic Prophylaxis: VTE Score: 6 High Risk (Score >/= 5) - Pharmacological DVT Prophylaxis Ordered: heparin drip. Sequential Compression Devices Ordered.    Mobility:   Basic Mobility Inpatient Raw Score: 22  JH-HLM Goal: 7: Walk 25 feet or more  JH-HLM Achieved: 4: Move to chair/commode  JH-HLM Goal NOT achieved. Continue with multidisciplinary rounding and encourage appropriate mobility to improve upon JH-HLM goals.    Patient Centered Rounds: I have performed bedside rounds with nursing staff today.  Discussions with Specialists or Other Care Team Provider: Case management vascular surgery urology ID    Education and Discussions with Family / Patient: Updated  (son and daughter) at bedside.    Current Length of Stay: 4 day(s)  Current Patient Status: Inpatient   Certification Statement: The patient will continue to require additional inpatient hospital stay due to transitioning anticoagulation  Discharge Plan: Anticipate  discharge tomorrow to home with home services.    Code Status: Level 1 - Full Code    Subjective   Patient seen and examined.  More tired today.  Multidisciplinary evaluation with urology vascular surgery and ID    Objective     Vitals:   Temp (24hrs), Av.7 °F (36.5 °C), Min:96.3 °F (35.7 °C), Max:99.4 °F (37.4 °C)    Temp:  [96.3 °F (35.7 °C)-99.4 °F (37.4 °C)] 96.7 °F (35.9 °C)  HR:  [77-94] 77  Resp:  [16-18] 16  BP: ()/(50-77) 93/51  SpO2:  [95 %-99 %] 95 %  Body mass index is 24 kg/m².     Input and Output Summary (last 24 hours):     Intake/Output Summary (Last 24 hours) at 2024 1644  Last data filed at 2024 2101  Gross per 24 hour   Intake --   Output 400 ml   Net -400 ml       Physical Exam  Vitals reviewed.   Constitutional:       General: She is not in acute distress.     Appearance: Normal appearance.   HENT:      Head: Atraumatic.   Eyes:      General: No scleral icterus.  Cardiovascular:      Rate and Rhythm: Regular rhythm.      Heart sounds: Normal heart sounds.   Pulmonary:      Breath sounds: Decreased breath sounds present. No wheezing.   Abdominal:      General: Bowel sounds are normal.      Palpations: Abdomen is soft.      Tenderness: There is no guarding.   Musculoskeletal:         General: Tenderness present.   Skin:     Comments: Blue right big toe   Neurological:      General: No focal deficit present.      Mental Status: She is alert and oriented to person, place, and time. Mental status is at baseline.   Psychiatric:         Mood and Affect: Mood normal.       Lines/Drains:  Lines/Drains/Airways       Active Status       Name Placement date Placement time Site Days    Nephrostomy Retrograde/ Ileal Conduit Right 24  1327  Right  3    Colostomy LLQ 03/10/21  1326  LLQ  1289                      Telemetry:  Telemetry Orders (From admission, onward)               24 Hour Telemetry Monitoring  Continuous x 24 Hours (Telem)        Question:  Reason for 24 Hour Telemetry   Answer:  Arrhythmias requiring acute medical intervention / PPM or ICD malfunction                     Telemetry Reviewed:   Indication for Continued Telemetry Use:                Lab Results: I have reviewed the following results:    Results from last 7 days   Lab Units 09/19/24 0524 09/18/24 0428 09/17/24 0448 09/16/24  1510 09/16/24  0434 09/15/24  1755   WBC Thousand/uL 11.07* 9.31 6.51  --    < > 8.56   HEMOGLOBIN g/dL 9.9* 9.2* 8.6*  --    < > 10.0*   PLATELETS Thousands/uL 405* 335 323  --    < > 296   MCV fL 90 90 89  --    < > 89   INR   --   --   --  1.07  --  1.20*    < > = values in this interval not displayed.     Results from last 7 days   Lab Units 09/19/24 0524 09/18/24 0428 09/17/24 0448 09/16/24  0434   SODIUM mmol/L 134* 134* 136 136   POTASSIUM mmol/L 3.8 3.7 3.8 3.4*   CHLORIDE mmol/L 97 99 101 102   CO2 mmol/L 29 27 28 28   ANION GAP mmol/L 8 8 7 6   BUN mg/dL 17 16 15 16   CREATININE mg/dL 0.76 0.78 0.76 0.67   CALCIUM mg/dL 9.7 9.0 8.9 8.9   ALBUMIN g/dL  --   --  2.8* 2.9*   TOTAL BILIRUBIN mg/dL  --   --  0.26 0.27   ALK PHOS U/L  --   --  164* 209*   ALT U/L  --   --  22 27   AST U/L  --   --  24 29   EGFR ml/min/1.73sq m 71 69 71 80   GLUCOSE RANDOM mg/dL 124 136 96 118                      Results from last 7 days   Lab Units 09/18/24 0428   PROCALCITONIN ng/ml 0.92*                   Recent Cultures (last 7 days):   Results from last 7 days   Lab Units 09/18/24  1737 09/16/24  1519 09/16/24  1509   BLOOD CULTURE   --  No Growth at 48 hrs. No Growth at 48 hrs.   GRAM STAIN RESULT  No Polys or Bacteria seen  --   --    BODY FLUID CULTURE, STERILE  No growth  --   --        Imaging:  Reviewed radiology reports from this admission including:  CTA chest pe study    Result Date: 9/17/2024  Impression: 1.  No identifiable acute abnormality to account for the patient's clinical presentation. No evidence of a pulmonary embolus. 2.  Mild to moderate aortic atherosclerotic disease, without  evidence of aneurysm formation or ulcerating plaques. 3.  Mild diffuse nonspecific subcutaneous edema. Workstation performed: [id number]     CTA abdominal w run off w wo contrast    Result Date: 9/16/2024  Impression: 1. No evidence of macro embolic disease or other acute arterial process. In the right leg, no significant proximal occlusive disease. There is moderate infrapopliteal disease with single vessel peroneal runoff. 2. No significant aortoiliac or left infrainguinal occlusive disease 3. No culprit lesion seen that would account for thromboembolic disease 4. 2.5 cm collection medial upper right thigh musculature with some peripheral enhancement. Differential includes chronic seroma, evolving hematoma, or abscess in the proper clinical setting. 5. Ill-defined fluid collection in the right retroperitoneum which may be intramuscular adjacent to several retroperitoneal clips. This could represent chronic postop collection. Again, in the proper clinical setting cannot exclude acute infectious process. If there is any clinical concerns for systemic infection, recommend repeat abdomen pelvis CT with IV and oral contrast. Workstation performed: [id number]     CT head without contrast    Result Date: 9/15/2024  Impression: No acute intracranial abnormality.  Chronic microangiopathic changes. Workstation performed: [id number]       Last 24 Hours Medication List:     Current Facility-Administered Medications:     acetaminophen (TYLENOL) tablet 975 mg, Q6H PRN    aluminum-magnesium hydroxide-simethicone (MAALOX) oral suspension 30 mL, Q6H PRN    amoxicillin (AMOXIL) capsule 500 mg, Q12H KASSIDY    ascorbic acid (VITAMIN C) tablet 500 mg, Daily    Cholecalciferol (VITAMIN D3) tablet 1,000 Units, Daily    diazepam (VALIUM) tablet 2.5 mg, Q6H PRN    dicyclomine (BENTYL) capsule 10 mg, TID PRN    docusate sodium (COLACE) capsule 100 mg, BID    heparin (porcine) 25,000 units in 0.45% NaCl 250 mL infusion (premix), Titrated, Last  Rate: 18 Units/kg/hr (09/19/24 0801)    heparin (porcine) injection 2,200 Units, Q6H PRN    heparin (porcine) injection 4,400 Units, Q6H PRN    HYDROmorphone HCl (DILAUDID) injection 0.2 mg, Q4H PRN    levothyroxine tablet 50 mcg, Early Morning    lisinopril (ZESTRIL) tablet 2.5 mg, Daily    morphine (MSIR) IR tablet 15 mg, Q6H PRN    multivitamin stress formula tablet 1 tablet, Daily    ondansetron (ZOFRAN-ODT) dispersible tablet 4 mg, Q6H PRN    polyethylene glycol (MIRALAX) packet 17 g, BID    saccharomyces boulardii (FLORASTOR) capsule 250 mg, BID    Administrative Statements   Today, Patient Was Seen By: Fernando Pacheco, DO  I have spent a total time of 40 minutes in caring for this patient on the day of the visit/encounter including Diagnostic results, Patient and family education, Impressions, Counseling / Coordination of care, Documenting in the medical record, Reviewing / ordering tests, medicine, procedures  , Obtaining or reviewing history  , and Communicating with other healthcare professionals .    **Please Note: This note may have been constructed using a voice recognition system.**

## 2024-09-19 NOTE — PROGRESS NOTES
Progress Note - Vascular Surgery   Name: Patsy Dallas 83 y.o. female I MRN: [id number]  Unit/Bed#: E2 -01 I Date of Admission: 9/15/2024   Date of Service: 9/19/2024 I Hospital Day: 4    Assessment & Plan  Ischemia of toe  84 yo female w/ a hx of stroke (2010), cervical ca (2011), DVT RLE (2011) S/P coumadin x6 months, Hodgkin's lymphoma (2013) S/P chemo/radiation w/ lung mets S/P wedge resection, CKD 3, chemo-induced peripheral neuropathy, LE lymphedema, hypothyroidism, HTN, MAXINE, and ureteral-iliac fistula w/ subsequent R EIA covered stent placement in 7/25/24 presented to [facility] on 9/15/24 w/ RLE pain from groin to toe and R hallux discoloration x1 week. Urology consulted for hx of ureteral-iliac fistula S/P repair w/ iliac stenting w/ no plans for further urologic intervention. ID consulted for management of chronic ABX suppression for hx of ureteral-iliac fistula.     Vascular surgery consulted for concern of acute limb ischemia versus blue toe syndrome. Pt has a hx of a ureteral-iliac fistula w/ subsequent R EIA covered stent placement on 7/25/24 at [facility] and follows w/ [facility]; last seen on 8/20. CTA abd shows patent R EIA stent w/ no significant proximal occlusive disease, mod infrapopliteal disease w/ 1-vessel runoff via peroneal. Pt has 2+ palpable femoral and DP pulses B/L w/ purple discoloration to R great toe. LEAD w/ pressures within healing range. 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    Vascular surgery re-consulted on 9/19 regarding concerns noted on imaging during IR procedure on 9/18. Per IR, imaging is concerning for infection and inflammation next to the covered EIA stent which may be a source of embolic phenomenon. No plans to explant the covered stent as risks of surgery outweigh benefits.    Diagnostics:  -9/18/24 LE venous duplex: Right: Evidence suggestive of chronic occlusive deep vein  thrombosis noted throughout the R femoral vein. Collateral vessels noted. Subacute vs. chronic thrombus noted in the greater saphenous vein at the inguinal level. No evidence of superficial thrombophlebitis noted. Doppler evaluation shows a normal response to augmentation maneuvers. Popliteal, posterior tibial and anterior tibial arterial Doppler waveforms are biphasic. Left: Evaluation shows no evidence of thrombus in the common femoral vein. Doppler evaluation shows a normal response to augmentation maneuvers.   -9/17/24 LEAD: Right: R external iliac stent appears patent. Diffuse lower extremity arterial occlusive disease throughout the femoropopliteal segments without focal stenosis. Evidence of tibioperoneal disease. R ALEX: 1.45 (poorly compressible vessels)/251/0 2nd toe pressure 62. Left: Diffuse lower extremity arterial occlusive disease throughout the femoropopliteal segments without focal stenosis. Evidence of tibioperoneal disease. L ALEX: 1.33/120/73.  -9/17/24 CTA chest/PE study: No identifiable acute abnormality to account for the patient's clinical presentation. No evidence of a pulmonary embolus. Mild to moderate aortic atherosclerotic disease, without evidence of aneurysm formation or ulcerating plaques. Mild diffuse nonspecific subcutaneous edema.  -9/15/24 CT head: No acute intracranial abnormality. Chronic microangiopathic changes.   -9/15/24 CTA abd w/ runoff: No evidence of macro embolic disease or other acute arterial process. In the right leg, no significant proximal occlusive disease. There is moderate infrapopliteal disease with single vessel peroneal runoff. No significant aortoiliac or left infrainguinal occlusive disease. No culprit lesion seen that would account for thromboembolic disease. 2.5 cm collection medial upper right thigh musculature with some peripheral enhancement. Differential includes chronic seroma, evolving hematoma, or abscess in the proper clinical setting. Ill-defined  fluid collection in the right retroperitoneum which may be intramuscular adjacent to several retroperitoneal clips. This could represent chronic postop collection. Again, in the proper clinical setting cannot exclude acute infectious process. If there is any clinical concerns for systemic infection, recommend repeat abdomen pelvis CT with IV and oral contrast.     Plan:  -R hallux discoloration without obvious source of emboli or significant atherosclerotic disease  -Inline flow via peroneal w/ palpable DP pulse  -CTA chest and abd both (-) for obvious embolic source  -No vascular surgical intervention as risks of explant of R EIA stent far outweigh benefits   -Podiatry following for R hallux w/ plans to monitor as an outpt for demarcation; input appreciated  -Hematology following for hx of cancer; input appreciated  -Continue heparin drip w/ transition to oral AC for 3 months to be managed by PCP  -Pt requests to follow up w/ [facility]'s V[facility] Surgery going forward; will schedule outpt appt  -Will discuss w/ Dr. Barney    Subjective:  Pt seen for exam while sitting in her chair, visiting w/ her daughter. Pt reports pain in R thigh and otherwise denies any further complaints at this time.     Objective      Temp:  [96.3 °F (35.7 °C)-99.4 °F (37.4 °C)] 96.3 °F (35.7 °C)  HR:  [77-94] 77  Resp:  [16-18] 16  BP: ()/(50-77) 93/51  O2 Device: None (Room air)          I/O         09/17 0701  09/18 0700 09/18 0701  09/19 0700 09/19 0701  09/20 0700    Urine (mL/kg/hr)  700 (0.5)     Total Output  700     Net  -700                  Lines/Drains/Airways       Active Status       Name Placement date Placement time Site Days    Nephrostomy Retrograde/ Ileal Conduit Right 09/16/24  1327  Right  3    Colostomy LLQ 03/10/21  1326  LLQ  1289                  Physical Exam  Constitutional:       General: She is awake.      Appearance: Normal appearance.   HENT:      Head: Normocephalic and atraumatic.   Cardiovascular:       Rate and Rhythm: Normal rate and regular rhythm.      Pulses:           Dorsalis pedis pulses are 2+ on the right side and 2+ on the left side.        Posterior tibial pulses are detected w/ Doppler on the right side and detected w/ Doppler on the left side.      Heart sounds: Normal heart sounds. No murmur heard.  Pulmonary:      Effort: Pulmonary effort is normal.      Breath sounds: Normal breath sounds.   Abdominal:      General: Bowel sounds are normal. There is no distension.      Palpations: Abdomen is soft.      Comments: L colostomy   Genitourinary:     Comments: R nephrostomy tube  Musculoskeletal:      Cervical back: Neck supple.      Right lower le+ Edema present.      Left lower le+ Edema present.   Skin:     General: Skin is warm and dry.      Capillary Refill: Capillary refill takes less than 2 seconds.      Comments: R great toe remains dusky purple   Neurological:      General: No focal deficit present.      Mental Status: She is alert and oriented to person, place, and time. Mental status is at baseline.   Psychiatric:         Attention and Perception: Attention normal.         Mood and Affect: Mood and affect normal.         Behavior: Behavior is cooperative.         Thought Content: Thought content normal.        Wound/Incision:      R foot    R foot        CBC with diff:   Lab Results   Component Value Date    WBC 11.07 (H) 2024    HGB 9.9 (L) 2024    HCT 30.8 (L) 2024    MCV 90 2024     (H) 2024    RBC 3.42 (L) 2024    MCH 28.9 2024    MCHC 32.1 2024    RDW 17.4 (H) 2024    MPV 9.0 2024    NRBC 0 2024     BMP/CMP:  Lab Results   Component Value Date    SODIUM 134 (L) 2024    SODIUM 137 2024    K 3.8 2024    K 4.5 2024    CL 97 2024     2024    CO2 29 2024    CO2 27 2024    BUN 17 2024    BUN 22 2024    CREATININE 0.76 2024    CREATININE 0.84  08/12/2024    CALCIUM 9.7 09/19/2024    CALCIUM 9.6 08/12/2024    AST 24 09/17/2024    AST 19 08/12/2024    ALT 22 09/17/2024    ALT 9 08/12/2024    ALKPHOS 164 (H) 09/17/2024    ALKPHOS 62 08/12/2024    EGFR 71 09/19/2024    EGFR 68 08/12/2024    EGFR 67 11/18/2020     Coags:   Lab Results   Component Value Date    PT 14.1 05/28/2024    PTT 62 (H) 09/19/2024    INR 1.07 09/16/2024    INR 1.1 05/28/2024     Blood Culture:   Lab Results   Component Value Date    BLOODCX No Growth at 48 hrs. 09/16/2024     Urinalysis:   Lab Results   Component Value Date    COLORU Yellow 09/18/2024    CLARITYU Clear 09/18/2024    SPECGRAV 1.017 09/18/2024    PHUR 6.0 09/18/2024    LEUKOCYTESUR Large (A) 09/18/2024    NITRITE Positive (A) 09/18/2024    GLUCOSEU Negative 09/18/2024    KETONESU Negative 09/18/2024    BILIRUBINUR Negative 09/18/2024    BLOODU Trace (A) 09/18/2024

## 2024-09-19 NOTE — PROGRESS NOTES
Progress Note - Infectious Disease   Name: Patsy Dallas 83 y.o. female I MRN: [id number]  Unit/Bed#: E2 -01 I Date of Admission: 9/15/2024   Date of Service: 9/19/2024 I Hospital Day: 4     Assessment & Plan  Ischemia of toe  Patient presented with progressing pain in her R leg and thigh and new ischemic changes to her right great toe.  Thigh without obvious external trauma or skin changes. There is a nonspecific fluid collection on CT in the medial upper right thigh with broad differential. Unclear if this is related to her recent bleeding. IR has performed drainage of bloody fluid, cultures are pending. Consider this may be clot related. 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 LEADs with diffuse arterial occlusive disease but great toe pressures are within healing range. Consider RLE pain may have claudication component. Consider this may be representative of embolic condition, with possible retroperitoneal fluid collection/iliac artery stent contributing. The patient remains clinically stable without fever or leukocytosis. She has been on chronic amoxicillin from [facility] ID for #3/4, but there is no indication for additional antibiotic treatment at this time.   -continue amoxicillin suppression per [facility] ID  -no indication for other antibiotics at this time  -monitor CBCD and BMP  -follow up IR aspirate culture from R thigh  -monitor vitals  -given possibility of claudication related pain, recommend patient keep leg out in front of her when in bed instead of elevated  Abnormal CT scan  Patient had CTA imaging done in the setting of #1.  She is noted to have some air-fluid collections in the retroperitoneal space adjacent to retroperitoneal clips.  She does not localize any symptoms to this area and PCN draining clear. Unclear if this may be postoperative given recent  extensive bleeding at end of July. IR has evaluated imaging and there is concern this finding may suggest a visceral fistula or infection in contact with her vascular stent, as she has air showing in the bladder as well. This may be causing an embolic phenomenon which could explain the great toe ischemia above. ID discussed further with IR and primary. She can speak with vascular/urology teams here for their input, however recommend she undergo evaluation by her primary vascular and urology teams at [facility] who are familiar with her complex history, anatomy, and previous surgical interventions.  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 She underwent stenting of the site at the end of July after acute significant bleed.  Stent appears to be open on current CTA image.  Nephrostomy draining clear. She has been following outpatient at [facility] and there ID team has her on chronic amoxicillin suppression.  -continue amoxicillin suppression per [facility] ID  -ongoing follow-up by [facility] vascular surgeon  Nephrostomy tube bleed (HCC)  Patient with chronic hematuria from her nephrostomy and was found to have #3.  Currently with clear output.  Will monitor for now.  Continue suppressive antibiotic as above.  History of cervical cancer  Patient has history of metastatic cervical cancer and she was diagnosed with metastatic lesion to the lung in 2018.  She completed localized radiation but no further chemotherapy.  She has been receiving holistic therapy since.  She was under surveillance.  Would question if this may have led to a hypercoagulable state leading to #1.  She also has background of prior non-Hodgkin's  lymphoma.  -oncology evaluation noted  -anticoagulation management per primary service    Above plan was discussed in detail with patient at the bedside.  Above plan was discussed in detail with [facility] who agree with plan on following up IR aspiration cultures. It is our recommendation that patient speak with [facility] urology and vascular surgery, but ultimately she would likely benefit from re-evaluation by her primary vascular surgery and urology teams at [facility] given complex concerns for retroperitoneal collection.    Antibiotics:  Chronic amoxicillin suppression    Subjective:  Patient reports she's still feeling poorly today. Has extensive pain down her R leg, especially in the foot and knee. Patient and family also note her R great toe is looking worse today with extending discoloration down the foot. Patient continues to have poor oral intake although family is working with her to encourage food and water. Patient still having chills. She has no nausea, vomiting, abdominal pain, diarrhea; no concerns with her ileal conduit; no cough, shortness of breath, or chest pain. No new symptoms.    Objective:  Vitals:  Temp:  [97.4 °F (36.3 °C)-99.3 °F (37.4 °C)] 98.3 °F (36.8 °C)  HR:  [77-94] 94  Resp:  [16-18] 18  BP: (108-164)/(56-72) 114/60  SpO2:  [96 %-100 %] 96 %  Temp (24hrs), Av.1 °F (36.7 °C), Min:97.4 °F (36.3 °C), Max:99.3 °F (37.4 °C)  Current: Temperature: 98.3 °F (36.8 °C)    Physical Exam:   General Appearance:  Alert, interactive, nontoxic, no acute distress. She appears chronically weak, ill, debilitated. She appears comfortable sitting up in her chair.   Lungs:   Clear to auscultation bilaterally; no wheezes, rhonchi or rales; respirations unlabored on room air.   Heart:  Tachycardic; no murmur, rub or gallop.   Abdomen:   Soft, non-distended, hypoactive bowel sounds.  Tender with palpation across the L flank, L side, and lower middle suprapubic region. Ileal conduit intact with navarro yellow output.    Extremities: R great toe ecchymosis now extending down the hallux.   Skin: No new rashes noted on exposed skin.     Labs, Imaging, & Other studies:   All pertinent labs and imaging studies were personally reviewed  Results from last 7 days   Lab Units 09/19/24 0524 09/18/24 0428 09/17/24 0448   WBC Thousand/uL 11.07* 9.31 6.51   HEMOGLOBIN g/dL 9.9* 9.2* 8.6*   PLATELETS Thousands/uL 405* 335 323     Results from last 7 days   Lab Units 09/19/24  0524 09/18/24 0428 09/17/24 0448 09/16/24  0434   POTASSIUM mmol/L 3.8   < > 3.8 3.4*   CHLORIDE mmol/L 97   < > 101 102   CO2 mmol/L 29   < > 28 28   BUN mg/dL 17   < > 15 16   CREATININE mg/dL 0.76   < > 0.76 0.67   EGFR ml/min/1.73sq m 71   < > 71 80   CALCIUM mg/dL 9.7   < > 8.9 8.9   AST U/L  --   --  24 29   ALT U/L  --   --  22 27   ALK PHOS U/L  --   --  164* 209*    < > = values in this interval not displayed.     Results from last 7 days   Lab Units 09/16/24  1519 09/16/24  1509   BLOOD CULTURE  No Growth at 48 hrs. No Growth at 48 hrs.     Results from last 7 days   Lab Units 09/18/24  0428 09/17/24 0448 09/16/24  1510   PROCALCITONIN ng/ml 0.92* 1.00* 1.24*

## 2024-09-19 NOTE — ASSESSMENT & PLAN NOTE
Hx of uretero-iliac fistula S/p emergent R external iliac artery stent grafting 7/25/2024 by vascular surgery at [facility]  CTA showing: Right external iliac artery stent is noted and appears patent  Follows with vascular surgery at [facility]

## 2024-09-19 NOTE — ASSESSMENT & PLAN NOTE
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 She underwent stenting of the site at the end of July after acute significant bleed.  Stent appears to be open on current CTA image.  Nephrostomy draining clear. She has been following outpatient at [facility] and there ID team has her on chronic amoxicillin suppression.  -continue amoxicillin suppression per [facility] ID  -ongoing follow-up by [facility] vascular surgeon

## 2024-09-19 NOTE — OCCUPATIONAL THERAPY NOTE
Occupational Therapy Cancellation        Patient Name: Patsy Dallas  Today's Date: 9/19/2024      Attempted to see patient for OT session and pt not appropriate as nursing completing a bedside procedure. Will continue to follow.     Radha Sanabria MS, OTR/L

## 2024-09-19 NOTE — ASSESSMENT & PLAN NOTE
History of a right ureteral stricture s/p ureteral stent placement in November 2021  Ureteral stents exchanged every 6 months per [facility] urology  Despite routine stent exchanges patient developed pyelonephritis and hydronephrosis requiring right PCN placement by interventional radiology  PCN found to be bleeding -- ureteral iliac fistula discovered   Creatinine stable  Denies flank pain   PCNU output remains clear for time being   No surgical intervention is recommended by our team -- recommend returning to [facility] for vascular recommendations/further intervention if warranted   Urology will SIGN OFF -- reach out with any changes

## 2024-09-19 NOTE — ASSESSMENT & PLAN NOTE
Patient with ongoing chills and rigors.  CT with right retroperitoneal fluid collection as well as medial thigh collection with enhancement  Right retroperitoneal fluid collection could be cause/source of embolism.  Blood cultures negative  IR aspirated medial thigh collection with low concerns for infection.

## 2024-09-19 NOTE — PROGRESS NOTES
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 Mcdowell. Been off of ASA of late  9/18/2024 doppler venous U/S:  RLE chronic DVT femoral vein, subacute thrombus greater R saphenous vein    RLE ischemia: appreciate Vascular recs, thromboembolic recs in process    Diffuse lower extremity arterial occlusive disease throughout the femoropopliteal segments without focal stenosis. Diffuse lower extremity arterial occlusive disease throughout the  femoropopliteal segments without focal stenosis  Assessment and Recommendations:   83F with Hx MAXINE: noted, normocytic anemia on this admission with Hgb 8.6, MCV 89  I am adding ferritin and iron panel  Her last IV iron was a month ago (2 sessions) and 2 months ago (2 sessions as well)  RLE ischemia: appreciate Vascular recs, thromboembolic recs in process  Cont heparin gtt as trial to assess tolerance of anti-coagulation with the recent bleeding to determine if this will recur.  Pt now leaning towards Eliquis at d/c for longer term OAC which is reasonable: will need this to be indefinite due to #3  She will f/u with Dr. Mcdowell of [facility] Hematology  3. 9/18/2024 doppler venous U/S:  RLE chronic DVT femoral  vein, subacute thrombus greater R saphenous vein  RLE ischemia 2/2 significant PAD noted: appreciate Vascular recs  Diffuse lower extremity arterial occlusive disease throughout the femoropopliteal segments without focal stenosis. Diffuse lower extremity arterial occlusive disease throughout the  femoropopliteal segments without focal stenosis  As can be seen above, pt has extensive arterial diseae and RLE DVT. Indefinite anti-coagulation would be ideal       Ema Dennison MD  Hematology Staff    Above communicated with the patient and the pt is in agreement       Thank you very much for your consultation and making us part of this nice patient's care. I will continue to follow closely with you. Please contact me with any additional questions.    Disclaimer: This document was prepared using [employer] technology. If a word or phrase is confusing, or does not make sense, this is likely due to recognition error which was not discovered during the providers review. If you believe an error has occurred, please contact me through [facility] service line for brianna?cation.    HPI:   Patsy Dallas is a 83 y.o. female admitted by Renea Flores DO with worsening discoloration and pain of the R large toe, cyanosis as an outpatient. She has had bleeding in the past on Coumadin. Tolerating heparin gtt without bleeding in the hospital at this time. Having RLE pain, ongoing. No new acute issues, not eating well overall.    Review of system:  12-point review of system was performed, pertinent positive and negative were detailed as above    Past Medical History:   Diagnosis Date    Cervical cancer (HCC) 01/16/2011    History of chemotherapy     age 70    History of DVT (deep vein thrombosis)     History of radiation therapy     age 70       Past Surgical History:   Procedure Laterality Date    BREAST EXCISIONAL BIOPSY Left 1985    benign    HYSTERECTOMY      age69    OOPHORECTOMY Bilateral     age 70       Family  History   Problem Relation Age of Onset    No Known Problems Mother     No Known Problems Father     No Known Problems Daughter     No Known Problems Maternal Grandmother     No Known Problems Maternal Grandfather     No Known Problems Paternal Grandmother     No Known Problems Paternal Grandfather        Social History     Socioeconomic History    Marital status: /Civil Union     Spouse name: Not on file    Number of children: Not on file    Years of education: Not on file    Highest education level: Not on file   Occupational History    Not on file   Tobacco Use    Smoking status: Never    Smokeless tobacco: Never   Vaping Use    Vaping status: Not on file   Substance and Sexual Activity    Alcohol use: Never    Drug use: No    Sexual activity: Not on file   Other Topics Concern    Not on file   Social History Narrative    Not on file     Social Determinants of Health     Financial Resource Strain: Low Risk  (7/25/2024)    Received from [facility]    Overall Financial Resource Strain (CARDIA)     Difficulty of Paying Living Expenses: Not hard at all   Food Insecurity: No Food Insecurity (7/25/2024)    Received from [facility]    Hunger Vital Sign     Worried About Running Out of Food in the Last Year: Never true     Ran Out of Food in the Last Year: Never true   Transportation Needs: No Transportation Needs (7/25/2024)    Received from [facility]    PRAPARE - Transportation     Lack of Transportation (Medical): No     Lack of Transportation (Non-Medical): No   Physical Activity: Not on file   Stress: Not on file   Social Connections: Unknown (2/28/2024)    Received from [facility]    [facility]     Social Network: Not on file   Intimate Partner Violence: Not At Risk (7/25/2024)    Received from [facility]    Humiliation, Afraid, Rape, and Kick questionnaire     Fear of Current or Ex-Partner: No     Emotionally Abused: No      Physically Abused: No     Sexually Abused: No   Housing Stability: Unknown (7/25/2024)    Received from [facility]    Housing Stability Vital Sign     Unable to Pay for Housing in the Last Year: Patient declined     Number of Times Moved in the Last Year: 0     Homeless in the Last Year: No       Allergies   Allergen Reactions    Iodinated Contrast Media Diarrhea     Severe! PO oral contrast    Severe    Iron Sucrose Anaphylaxis, Other (See Comments) and Shortness Of Breath     Specifically venafor brand; labored breathing, chest discomfort, weakness, facial flush. Therahem is ok    Specifically Venofer brand; labored breathing, chest discomfort, weakness, facial flush.     Therahem is ok    Bee Pollen Allergic Rhinitis    Ibuprofen GI Bleeding    Misc. Sulfonamide Containing Compounds Hives    Wound Dressing Adhesive Other (See Comments)     Blisters    Medical Tape Itching, Rash and Other (See Comments)    Other Itching, Other (See Comments), Diarrhea and Hives     BAND AIDES RASH    Blisters    Oral CT contrast; tolerates IV contrast    Oxybutynin Rash and Other (See Comments)    Pollen Extract Allergic Rhinitis and Other (See Comments)    Sulfa Antibiotics Rash and Other (See Comments)    Sulfur Rash    Wound Dressings Rash     BAND AIDS RASH AND BLISTERS       Current Facility-Administered Medications   Medication Dose Route Frequency Provider Last Rate Last Admin    acetaminophen (TYLENOL) tablet 975 mg  975 mg Oral Q6H PRN ARNOLD Lama   975 mg at 09/19/24 0826    aluminum-magnesium hydroxide-simethicone (MAALOX) oral suspension 30 mL  30 mL Oral Q6H PRN ARNOLD Lama        amoxicillin (AMOXIL) capsule 500 mg  500 mg Oral Q12H [facility] ARNOLD Lama   500 mg at 09/19/24 0825    ascorbic acid (VITAMIN C) tablet 500 mg  500 mg Oral Daily ARNOLD Lama   500 mg at 09/19/24 0825    Cholecalciferol (VITAMIN D3) tablet 1,000 Units  1,000 Units Oral Daily  ARNOLD Lama   1,000 Units at 09/19/24 0825    diazepam (VALIUM) tablet 2.5 mg  2.5 mg Oral Q6H PRN ARNOLD Lama   2.5 mg at 09/18/24 2053    dicyclomine (BENTYL) capsule 10 mg  10 mg Oral TID PRN ARNOLD Lama        docusate sodium (COLACE) capsule 100 mg  100 mg Oral BID ARNOLD Lama   100 mg at 09/19/24 0825    heparin (porcine) 25,000 units in 0.45% NaCl 250 mL infusion (premix)  3-30 Units/kg/hr (Order-Specific) Intravenous Titrated Fernando Pacheco, DO 9.9 mL/hr at 09/19/24 0801 18 Units/kg/hr at 09/19/24 0801    heparin (porcine) injection 2,200 Units  2,200 Units Intravenous Q6H PRN Fernando Pacheco, DO        heparin (porcine) injection 4,400 Units  4,400 Units Intravenous Q6H PRN Fernando Pacheco, DO        HYDROmorphone HCl (DILAUDID) injection 0.2 mg  0.2 mg Intravenous Q4H PRN ARNOLD Lama        levothyroxine tablet 50 mcg  50 mcg Oral Early Morning ARNOLD Lama   50 mcg at 09/19/24 0524    lisinopril (ZESTRIL) tablet 2.5 mg  2.5 mg Oral Daily ARNOLD Lama   2.5 mg at 09/19/24 0825    morphine (MSIR) IR tablet 15 mg  15 mg Oral Q6H PRN ARNOLD Lama   15 mg at 09/16/24 2059    multivitamin stress formula tablet 1 tablet  1 tablet Oral Daily ARNOLD Lama   1 tablet at 09/19/24 0825    ondansetron (ZOFRAN-ODT) dispersible tablet 4 mg  4 mg Oral Q6H PRN ARNOLD Lama        polyethylene glycol (MIRALAX) packet 17 g  17 g Oral BID Josué Freedman MD   17 g at 09/18/24 2049    saccharomyces boulardii (FLORASTOR) capsule 250 mg  250 mg Oral BID ARNOLD Lama   250 mg at 09/19/24 0825       Medications Prior to Admission   Medication Sig Dispense Refill Last Dose    amoxicillin (AMOXIL) 500 mg capsule Take 500 mg by mouth every 12 (twelve) hours       docusate sodium (COLACE) 50 mg capsule Take 1 capsule (50 mg total) by mouth 2 (two) times a day as needed for constipation 90 capsule 3      lisinopril (ZESTRIL) 2.5 mg tablet Take 2.5 mg by mouth daily  3     acetaminophen (TYLENOL) 650 mg CR tablet Take 650 mg by mouth every 8 (eight) hours as needed         ascorbic acid (VITAMIN C) 1000 MG tablet Take 1,000 mg by mouth       aspirin (ECOTRIN LOW STRENGTH) 81 mg EC tablet Take by mouth       ASPIRIN 81 PO Take 1 tablet every day by oral route.       BD PosiFlush 0.9 % SOLN        Calcium Polycarbophil (Fiber) 625 MG TABS Take by mouth daily       CALCIUM-MAGNESIUM-VITAMIN D PO Take by mouth       cefpodoxime (VANTIN) 200 mg tablet take 1 tablet by mouth 2 times a day for 7 days       Cholecalciferol (VITAMIN D3) 2000 units capsule Take 1 tablet by mouth continuous as needed         cycloSPORINE (Restasis) 0.05 % ophthalmic emulsion Administer 1 drop to both eyes every 12 (twelve) hours         DAILY MULTIPLE VITAMINS PO Take 1 tablet by mouth daily       diazepam (VALIUM) 5 mg tablet Take 2.5 mg by mouth every 6 (six) hours as needed for anxiety       dicyclomine (BENTYL) 10 mg capsule TAKE 1 CAPSULE BY MOUTH 3 TIMES A DAY AS NEEDED FOR CRAMPING       famotidine (PEPCID) 20 mg tablet Take 20 mg by mouth daily         fluticasone (FLONASE) 50 mcg/act nasal spray 2 sprays into each nostril as needed       Garlic 100 MG TABS Take by mouth       Glutathione POWD Take by mouth daily         guaiFENesin (MUCINEX) 600 mg 12 hr tablet Take 1,200 mg by mouth       METRONIDAZOLE, TOPICAL, 0.75 % LOTN metronidazole 0.75 % lotion   APPLY 1 2 TIMES DAILY TO FACE 90 DAY SUPPLY       Omega-3 Fatty Acids (FISH OIL) 1,000 mg Take 1,000 mg by mouth daily       omeprazole (PriLOSEC) 20 mg delayed release capsule Take 20 mg by mouth       ondansetron (ZOFRAN) 4 mg tablet Take 1 tablet (4 mg total) by mouth every 8 (eight) hours as needed for nausea or vomiting 20 tablet 0     Probiotic Product (PROBIOTIC-10) CAPS Take by mouth daily       sodium chloride (OCEAN) 0.65 % nasal spray 1 spray into each nostril daily        "Specialty Vitamins Products (ONE-A-DAY BONE STRENGTH PO) Take 3 capsules by mouth daily         SYNTHROID 50 MCG tablet Take 50 mcg by mouth daily         TURMERIC CURCUMIN PO Take by mouth       Ubiquinol 100 MG CAPS Take 1 capsule by mouth daily            Objective:     24 Hour Vitals Assessment:     BP-Systolic (24hrs), Av , Min:114 , Max:164   BP-Diastolic (24hrs), Av, Min:60, Max:77  BP  Min: 114/60  Max: 164/72  Temp  Av.3 °F (36.8 °C)  Min: 97.4 °F (36.3 °C)  Max: 99.4 °F (37.4 °C)  Pulse  Av  Min: 77  Max: 94  Resp  Av.7  Min: 16  Max: 18  SpO2  Av.7 %  Min: 96 %  Max: 100 %    PHYSICIAN EXAM:      Physical exam:  General:  Appears in no distress, lying down having an ultrasound exam  Neuro:  Speaks in full sentences, no focal deficits noted  Pulmonary:  No accessory muscle use or O2 noted  Cardiovascular: Regular rate, +S1/S2  GI:  Appears nondistended, no masses noted  Extremities:  No new rash noted, no cyanosis  Psychiatry:  Normal mood with congruent affect  Ear nose and throat:  Atraumatic, extraocular muscles intact        Data Review:    Image Study:  Noted    LABS:  CBC:  Recent Labs     24  0524   WBC 6.51 9.31 11.07*   MCV 89 90 90   MCH 28.0 28.5 28.9   MCHC 31.6 31.5 32.1   RDW 17.3* 17.3* 17.4*   MPV 9.1 9.4 9.0     CMP:   Recent Labs     248 24  0524   SODIUM 136 134* 134*   BUN 15 16 17   CALCIUM 8.9 9.0 9.7   CREATININE 0.76 0.78 0.76       MISC. LABS:    No results for input(s): \"MAGNESIUM\", \"LDH\", \"URICACID\", \"PHOSPHORUS\", \"LACTICACID\" in the last 72 hours.  LFT:   Recent Labs     24   AST 24       Coags:  Invalid input(s): \"COAGPROFILE\"  Recent Labs     24  1510 24  1949 24  0309 24  1515 24  0524   INR 1.07  --   --   --   --    PTT 18*   < > 77* 72* 62*    < > = values in this interval not displayed.       By:  Ema Dennison MD, 2024, 8:59 " AM                                  Primary Care Physician:  Harvey Monteiro Jr., MD

## 2024-09-19 NOTE — ASSESSMENT & PLAN NOTE
Patient had CTA imaging done in the setting of #1.  She is noted to have some air-fluid collections in the retroperitoneal space adjacent to retroperitoneal clips.  She does not localize any symptoms to this area and PCN draining clear. Unclear if this may be postoperative given recent extensive bleeding at end of July. IR has evaluated imaging and there is concern this finding may suggest a visceral fistula or infection in contact with her vascular stent, as she has air showing in the bladder as well. This may be causing an embolic phenomenon which could explain the great toe ischemia above. ID discussed further with IR and primary. She can speak with vascular/urology teams here for their input, however recommend she undergo evaluation by her primary vascular and urology teams at [facility] who are familiar with her complex history, anatomy, and previous surgical interventions.  -monitor exam for now  -monitor CBCD and BMP  -monitor vitals  -okay for [facility] vascular and urology consults  -recommend patient be re-evaluated by her primary vascular surgery and urology teams at [facility]

## 2024-09-19 NOTE — PLAN OF CARE
Problem: PAIN - ADULT  Goal: Verbalizes/displays adequate comfort level or baseline comfort level  Description: Interventions:  - Encourage patient to monitor pain and request assistance  - Assess pain using appropriate pain scale  - Administer analgesics based on type and severity of pain and evaluate response  - Implement non-pharmacological measures as appropriate and evaluate response  - Consider cultural and social influences on pain and pain management  - Notify physician/advanced practitioner if interventions unsuccessful or patient reports new pain  Outcome: Progressing     Problem: INFECTION - ADULT  Goal: Absence or prevention of progression during hospitalization  Description: INTERVENTIONS:  - Assess and monitor for signs and symptoms of infection  - Monitor lab/diagnostic results  - Monitor all insertion sites, i.e. indwelling lines, tubes, and drains  - Monitor endotracheal if appropriate and nasal secretions for changes in amount and color  - [place] appropriate cooling/warming therapies per order  - Administer medications as ordered  - Instruct and encourage patient and family to use good hand hygiene technique  - Identify and instruct in appropriate isolation precautions for identified infection/condition  Outcome: Progressing  Goal: Absence of fever/infection during neutropenic period  Description: INTERVENTIONS:  - Monitor WBC    Outcome: Progressing     Problem: SAFETY ADULT  Goal: Patient will remain free of falls  Description: INTERVENTIONS:  - Educate patient/family on patient safety including physical limitations  - Instruct patient to call for assistance with activity   - Consult OT/PT to assist with strengthening/mobility   - Keep Call bell within reach  - Keep bed low and locked with side rails adjusted as appropriate  - Keep care items and personal belongings within reach  - Initiate and maintain comfort rounds  - Make Fall Risk Sign visible to staff  - Offer Toileting every 2 Hours,  in advance of need  - Initiate/Maintain bed alarm  - Obtain necessary fall risk management equipment: bed alarm, call bell,  socks  - Apply yellow socks and bracelet for high fall risk patients  - Consider moving patient to room near nurses station  Outcome: Progressing  Goal: Maintain or return to baseline ADL function  Description: INTERVENTIONS:  -  Assess patient's ability to carry out ADLs; assess patient's baseline for ADL function and identify physical deficits which impact ability to perform ADLs (bathing, care of mouth/teeth, toileting, grooming, dressing, etc.)  - Assess/evaluate cause of self-care deficits   - Assess range of motion  - Assess patient's mobility; develop plan if impaired  - Assess patient's need for assistive devices and provide as appropriate  - Encourage maximum independence but intervene and supervise when necessary  - Involve family in performance of ADLs  - Assess for home care needs following discharge   - Consider OT consult to assist with ADL evaluation and planning for discharge  - Provide patient education as appropriate  Outcome: Progressing  Goal: Maintains/Returns to pre admission functional level  Description: INTERVENTIONS:  - Perform AM-PAC 6 Click Basic Mobility/ Daily Activity assessment daily.  - Set and communicate daily mobility goal to care team and patient/family/caregiver.   - Collaborate with rehabilitation services on mobility goals if consulted  - Perform Range of Motion 3 times a day.  - Reposition patient every 2 hours.  - Dangle patient 3 times a day  - Stand patient 3 times a day  - Ambulate patient 3 times a day  - Out of bed to chair 3 times a day   - Out of bed for meals 3 times a day  - Out of bed for toileting  - Record patient progress and toleration of activity level   Outcome: Progressing     Problem: DISCHARGE PLANNING  Goal: Discharge to home or other facility with appropriate resources  Description: INTERVENTIONS:  - Identify barriers to  discharge w/patient and caregiver  - Arrange for needed discharge resources and transportation as appropriate  - Identify discharge learning needs (meds, wound care, etc.)  - Arrange for interpretive services to assist at discharge as needed  - Refer to Case Management Department for coordinating discharge planning if the patient needs post-hospital services based on physician/advanced practitioner order or complex needs related to functional status, cognitive ability, or social support system  Outcome: Progressing     Problem: Knowledge Deficit  Goal: Patient/family/caregiver demonstrates understanding of disease process, treatment plan, medications, and discharge instructions  Description: Complete learning assessment and assess knowledge base.  Interventions:  - Provide teaching at level of understanding  - Provide teaching via preferred learning methods  Outcome: Progressing     Problem: Prexisting or High Potential for Compromised Skin Integrity  Goal: Skin integrity is maintained or improved  Description: INTERVENTIONS:  - Identify patients at risk for skin breakdown  - Assess and monitor skin integrity  - Assess and monitor nutrition and hydration status  - Monitor labs   - Assess for incontinence   - Turn and reposition patient  - Assist with mobility/ambulation  - Relieve pressure over bony prominences  - Avoid friction and shearing  - Provide appropriate hygiene as needed including keeping skin clean and dry  - Evaluate need for skin moisturizer/barrier cream  - Collaborate with interdisciplinary team   - Patient/family teaching  - Consider wound care consult   Outcome: Progressing     Problem: Nutrition/Hydration-ADULT  Goal: Nutrient/Hydration intake appropriate for improving, restoring or maintaining nutritional needs  Description: Monitor and assess patient's nutrition/hydration status for malnutrition. Collaborate with interdisciplinary team and initiate plan and interventions as ordered.  Monitor  patient's weight and dietary intake as ordered or per policy. Utilize nutrition screening tool and intervene as necessary. Determine patient's food preferences and provide high-protein, high-caloric foods as appropriate.     INTERVENTIONS:  - Monitor oral intake, urinary output, labs, and treatment plans  - Assess nutrition and hydration status and recommend course of action  - Evaluate amount of meals eaten  - Assist patient with eating if necessary   - Allow adequate time for meals  - Recommend/ encourage appropriate diets, oral nutritional supplements, and vitamin/mineral supplements  - Order, calculate, and assess calorie counts as needed  - Recommend, monitor, and adjust tube feedings and TPN/PPN based on assessed needs  - Assess need for intravenous fluids  - Provide specific nutrition/hydration education as appropriate  - Include patient/family/caregiver in decisions related to nutrition  Outcome: Progressing

## 2024-09-19 NOTE — ASSESSMENT & PLAN NOTE
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   GI did not have any immediate recommendations for endoscopic evaluation    Results from last 7 days   Lab Units 09/19/24  0524 09/18/24  0428 09/17/24  0448 09/16/24  0434   HEMOGLOBIN g/dL 9.9* 9.2* 8.6* 8.7*

## 2024-09-19 NOTE — ASSESSMENT & PLAN NOTE
Presented to [facility] ED 9/15/24 with right toe ischemia   Differential includes thrombus versus infectious  Vascular surgery recommends thromboembolic workup to be completed  CTA chest abdomen pelvis and echocardiogram without source.   Previously on warfarin for thromboembolism.  Vascular and hematology recommended anticoagulation.  Heparin infusion ongoing   No sign of hematuria   Gas in the area of retroperitonea  ill-defined fluid collection is noted around surgical clips and this could be source of embolism.  Ongoing discuss with [facility] for continuity

## 2024-09-19 NOTE — CONSULTS
Consult - Urology   Patsy Juarezbora 1940, 83 y.o. female MRN: [id number]    Unit/Bed#: E2 -01 Encounter: [id number]      H/O colectomy  Assessment & Plan  History of SBO s/p colectomy. 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    PCNU remains in place draining clear yellow urine  She has been following outpatient at [facility] and there ID team has her on chronic amoxicillin suppression  Continue amoxicillin suppression per [facility] ID  Recommend ongoing follow up by [facility] vascular surgery team     Nephrostomy tube bleed (HCC)  Assessment & Plan  History of a right ureteral stricture s/p ureteral stent placement in November 2021  Ureteral stents exchanged every 6 months per [facility] urology  Despite routine stent exchanges patient developed pyelonephritis and hydronephrosis requiring right PCN placement by interventional radiology  PCN found to be bleeding -- ureteral iliac fistula discovered   Creatinine stable  Denies flank pain   PCNU output remains clear for time being   No surgical intervention is recommended by our team -- recommend returning to [facility] for vascular recommendations/further intervention if warranted   Urology will SIGN OFF -- reach out with any changes    History of cervical cancer  Assessment & Plan  History of recurrent metastatic squamous cervical cancer s/p hysterectomy; metastasis to lung with resection (2014) and SBRT (2018).    * Ischemia of toe  Assessment & Plan  Presented to [facility] ED 9/15/24 with right toe ischemia   Differential includes  thrombus versus infectious  Vascular surgery recommends thromboembolic workup to be completed  CTA chest abdomen pelvis and echocardiogram without source.   Previously on warfarin for thromboembolism.  Vascular and hematology recommended anticoagulation.  Heparin infusion ongoing   No sign of hematuria   Gas in the area of retroperitonea  ill-defined fluid collection is noted around surgical clips and this could be source of embolism.  Ongoing discuss with [facility] for continuity      Subjective:   Patsy is an 83-year-old female with past medical history of diabetes, remote DVT, ureteral-iliac fistula s/p stent grafting, s/p colostomy, metastatic cervical cancer, and hx of right ureteral stricture s/p PCNU placement.--who presented to [facility] ED on 9/15/2024 with pain and discoloration to her right great toe.  CTA vRad -- occlusion vs congenital absence of the native proximal right BRONSON, the distal right BRONSON appears to have good collateral flow and good distal coverage of the ankle and foot.  Admitted under internal medicine team 2/2 concern of blue toe syndrome.    Urology is now being consulted hospital day #4 for recommendations when it comes to ongoing right sided nephrostomy tube and ureteral-iliac fistula.  As stated above, she has a history of a right ureteral stricture s/p ureteral stent placement in November 2021.  Ureteral stents exchanged every 6 months per [facility] urology. Despite routine stent exchanges patient developed pyelonephritis and hydronephrosis requiring right PCN placement by interventional radiology. Recurrent bleeding from right PCN noted. Underwent cystoscopy clot evacuation and conversion of right PCN to PCNU and TURBT by [facility] urology 7/25/24. On 7/25/2024 she was also found to have a ureteral iliac fistula and is s/p right EIA stent graft placement.  Post-op she is on chronic suppression therapy with amoxicillin 500 mg twice daily and follows with ID at [facility].  She was recently treated for UTI with  "Vantin x 7 days on 9/5.      Today in consultation patient reports pain at right leg/toe rated a 7/10.  She continues urinating independently, no signs of urinary retention.  VSS, afebrile. Decreased appetite and is not drinking much water. She denies pain within the bladder or bilateral flanks. She passed a small clot 2 days ago per urethra but has not noticed any obvious hematuria. Mild dysuria ongoing. Repeat urine studies 9/18 are suspicious for repeat UTI, culture pending. Right PCN continues draining clear yellow urine -- 400 cc output over the past 24 hours. Hgb 9.9. Creatinine remains stable at 0.76.     Urology will sign off but remain available for any further inpatient needs. Please feel free to contact the provider currently covering the Urology Epic Chat role for this campus with questions or concerns.    Review of Systems   Constitutional:  Positive for fatigue. Negative for activity change, chills and fever.   Respiratory:  Negative for apnea, cough and shortness of breath.    Cardiovascular:  Negative for chest pain and leg swelling.   Gastrointestinal:  Negative for abdominal distention, abdominal pain, constipation, diarrhea, nausea and vomiting.   Genitourinary:  Positive for dysuria. Negative for difficulty urinating, flank pain, frequency, hematuria, pelvic pain, urgency, vaginal bleeding and vaginal discharge.   Musculoskeletal:  Negative for arthralgias and back pain.   Skin:  Positive for color change (right big toe).   Neurological:  Negative for dizziness and headaches.   Psychiatric/Behavioral: Negative.     All other systems reviewed and are negative.      Objective:  Vitals: Blood pressure 101/58, pulse 94, temperature 98 °F (36.7 °C), temperature source Oral, resp. rate 18, height 5' 1\" (1.549 m), weight 57.6 kg (127 lb), SpO2 97%.,Body mass index is 24 kg/m².    Physical Exam  Constitutional:       General: She is not in acute distress.     Appearance: Normal appearance. She is not " ill-appearing.   HENT:      Head: Normocephalic and atraumatic.      Right Ear: External ear normal.      Left Ear: External ear normal.      Nose: Nose normal.      Mouth/Throat:      Pharynx: Oropharynx is clear.   Eyes:      Extraocular Movements: Extraocular movements intact.      Conjunctiva/sclera: Conjunctivae normal.   Cardiovascular:      Rate and Rhythm: Normal rate.   Pulmonary:      Effort: Pulmonary effort is normal.   Abdominal:      General: Abdomen is flat. There is no distension.      Palpations: Abdomen is soft.      Tenderness: There is no abdominal tenderness. There is no right CVA tenderness or left CVA tenderness.      Comments: LLQ stoma draining well - stoma pink and clean   Right PCN with clear yellow urine production - no hematuria or clots   Musculoskeletal:         General: Normal range of motion.      Cervical back: Normal range of motion.   Neurological:      General: No focal deficit present.      Mental Status: She is alert and oriented to person, place, and time.   Psychiatric:         Mood and Affect: Mood normal.         Behavior: Behavior normal.         Imaging:    CTA - CHEST WITH IV CONTRAST - PULMONARY ANGIOGRAM    INDICATION: Blue toe syndome, vascular surg recommends CTA eval for embolic phenomena.    COMPARISON: CTA abdomen 9/15/2024    TECHNIQUE: CTA examination of the chest was performed using angiographic technique according to a protocol specifically tailored to evaluate for pulmonary embolism. Multiplanar 2D reformatted images were created from the source data. In addition, coronal   3D MIP postprocessing was performed on the acquisition scanner.    Radiation dose length product (DLP) for this visit: 197 mGy-cm . This examination, like all CT scans performed in the [facility], was performed utilizing techniques to minimize radiation dose exposure, including the use of iterative  reconstruction and automated exposure control.    IV Contrast: 85 mL of  iohexol (OMNIPAQUE)    FINDINGS:    PULMONARY ARTERIAL TREE:  No pulmonary embolus.      LUNGS: There is mild atelectasis and/or scarring along the wedge resection upper lobes, bilaterally. This is most pronounced within the left lung apex, where there is an overall wedge/platelike opacity. No lobar consolidation or diffuse interstitial lung   disease. There is mild focal pleural-parenchymal scarring along the medial right lower lobe (series 3, image 95), with an associated 4 mm nodular focus. The overall region measures 0.8 x 1.7 cm on series 3 image 78.    PLEURA: See above.    HEART/GREAT VESSELS: The heart is borderline enlarged, with at least moderate multivessel coronary artery calcifications. Mild to moderate aortic atherosclerotic changes, without aneurysm formation. No visible ulcerating plaques.    MEDIASTINUM AND GISELL: Unremarkable.    CHEST WALL AND LOWER NECK: Moderate diffuse subcutaneous edema.    VISUALIZED STRUCTURES IN THE UPPER ABDOMEN: Mild fullness of the left renal collecting system.    OSSEOUS STRUCTURES: Mild to moderate multilevel thoracic spondylosis and chronic appearing mild to moderate T11 superior endplate compression deformity.   Impression:       1.  No identifiable acute abnormality to account for the patient's clinical presentation. No evidence of a pulmonary embolus.  2.  Mild to moderate aortic atherosclerotic disease, without evidence of aneurysm formation or ulcerating plaques.  3.  Mild diffuse nonspecific subcutaneous edema.     Imaging reviewed - both report and images personally reviewed.     Labs:  Recent Labs     09/17/24 0448 09/18/24  0428 09/19/24  0524   WBC 6.51 9.31 11.07*     Recent Labs     09/17/24  0448 09/18/24  0428 09/19/24  0524   HGB 8.6* 9.2* 9.9*       Recent Labs     09/17/24  0448 09/18/24  0428 09/19/24  0524   CREATININE 0.76 0.78 0.76       Microbiology:  Urine culture - pending  Blood cultures x2 - no growth at 48 hrs     History:  Social History      Socioeconomic History    Marital status: /Civil Union     Spouse name: None    Number of children: None    Years of education: None    Highest education level: None   Occupational History    None   Tobacco Use    Smoking status: Never    Smokeless tobacco: Never   Vaping Use    Vaping status: None   Substance and Sexual Activity    Alcohol use: Never    Drug use: No    Sexual activity: None   Other Topics Concern    None   Social History Narrative    None     Social Determinants of Health     Financial Resource Strain: Low Risk  (7/25/2024)    Received from [facility]    Overall Financial Resource Strain (CARDIA)     Difficulty of Paying Living Expenses: Not hard at all   Food Insecurity: No Food Insecurity (9/19/2024)    Hunger Vital Sign     Worried About Running Out of Food in the Last Year: Never true     Ran Out of Food in the Last Year: Never true   Transportation Needs: No Transportation Needs (9/19/2024)    PRAPARE - Transportation     Lack of Transportation (Medical): No     Lack of Transportation (Non-Medical): No   Physical Activity: Not on file   Stress: Not on file   Social Connections: Unknown (2/28/2024)    Received from [facility]     Social Network: Not on file   Intimate Partner Violence: Not At Risk (7/25/2024)    Received from [facility]    Humiliation, Afraid, Rape, and Kick questionnaire     Fear of Current or Ex-Partner: No     Emotionally Abused: No     Physically Abused: No     Sexually Abused: No   Housing Stability: Low Risk  (9/19/2024)    Housing Stability Vital Sign     Unable to Pay for Housing in the Last Year: No     Number of Times Moved in the Last Year: 0     Homeless in the Last Year: No       Past Medical History:   Diagnosis Date    Cervical cancer (HCC) 01/16/2011    History of chemotherapy     age 70    History of DVT (deep vein thrombosis)     History of radiation therapy     age 70     Past Surgical  History:   Procedure Laterality Date    BREAST EXCISIONAL BIOPSY Left 1985    benign    HYSTERECTOMY      age71    OOPHORECTOMY Bilateral     age 70     Family History   Problem Relation Age of Onset    No Known Problems Mother     No Known Problems Father     No Known Problems Daughter     No Known Problems Maternal Grandmother     No Known Problems Maternal Grandfather     No Known Problems Paternal Grandmother     No Known Problems Paternal Grandfather        Yareli Saez PA-C  Date: 9/19/2024 Time: 2:08 PM

## 2024-09-19 NOTE — ASSESSMENT & PLAN NOTE
Patient presented with progressing pain in her R leg and thigh and new ischemic changes to her right great toe.  Thigh without obvious external trauma or skin changes. There is a nonspecific fluid collection on CT in the medial upper right thigh with broad differential. Unclear if this is related to her recent bleeding. IR has performed drainage of bloody fluid, cultures are pending. Consider this may be clot related. 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 LEADs with diffuse arterial occlusive disease but great toe pressures are within healing range. Consider RLE pain may have claudication component. Consider this may be representative of embolic condition, with possible retroperitoneal fluid collection/iliac artery stent contributing. The patient remains clinically stable without fever or leukocytosis. She has been on chronic amoxicillin from [facility] ID for #3/4, but there is no indication for additional antibiotic treatment at this time.   -continue amoxicillin suppression per [facility] ID  -no indication for other antibiotics at this time  -monitor CBCD and BMP  -follow up IR aspirate culture from R thigh  -monitor vitals  -given possibility of claudication related pain, recommend patient keep leg out in front of her when in bed instead of elevated

## 2024-09-19 NOTE — ASSESSMENT & PLAN NOTE
Hx of R ureteral stricture s/p stent c/b pyelonephritis and hydronephrosis  Has persistent bleeding from right nephrostomy, underwent clot evacuation and conversion of right PCN to PCNU and TURBT. 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 Follows with ID at [facility]

## 2024-09-19 NOTE — ASSESSMENT & PLAN NOTE
Extensive past medical history including SBO with ileostomy, cervical cancer, nephrostomy tube bleed with a history of utero iliac fistula status post external iliac artery stenting July 2024 who presents with right big toe ischemia  Differentials: thrombus versus infectious  Vascular surgery recommends thromboembolic workup to be completed.  CTA chest abdomen pelvis and echocardiogram without source.   Previously on warfarin for thromboembolism.  Vascular and hematology recommended anticoagulation.  Has been started on heparin infusion.  Discussion with ID and IR.  Gas in the area of retroperitoneal  ill-defined fluid collection is noted around surgical clips and this could be source of embolism.    I personally discussed with the patient's vascular surgery service at [facility]: Dr. Kofi Kee.  Nonsurgical candidate for any sort of stent removal or intervention.  Recommendation was to continue with [place] and suppressive antibiotics.  Disposition: Palliative approach.  Transition heparin infusion to eliquis.

## 2024-09-19 NOTE — PROGRESS NOTES
Patient:    MRN:  [id number]    Chantale Request ID:  [id number]    Level of care reserved:  Home Health Agency    Partner Reserved:  [facility], [place] [id number] [phone]    Clinical needs requested:    Geography searched:  [phone]    Start of Service:    Request sent:  12:04pm EDT on 9/19/2024 by Debora Brody    Partner reserved:  12:26pm EDT on 9/19/2024 by Debora Brody    Choice list shared:

## 2024-09-20 PROBLEM — I82.401 ACUTE DEEP VEIN THROMBOSIS (DVT) OF RIGHT LOWER EXTREMITY (HCC): Status: ACTIVE | Noted: 2024-09-20

## 2024-09-20 LAB
ERYTHROCYTE [DISTWIDTH] IN BLOOD BY AUTOMATED COUNT: 17.5 % (ref 11.6–15.1)
HCT VFR BLD AUTO: 32 % (ref 34.8–46.1)
HGB BLD-MCNC: 10.2 G/DL (ref 11.5–15.4)
MCH RBC QN AUTO: 28.3 PG (ref 26.8–34.3)
MCHC RBC AUTO-ENTMCNC: 31.9 G/DL (ref 31.4–37.4)
MCV RBC AUTO: 89 FL (ref 82–98)
PLATELET # BLD AUTO: 439 THOUSANDS/UL (ref 149–390)
PMV BLD AUTO: 8.7 FL (ref 8.9–12.7)
RBC # BLD AUTO: 3.6 MILLION/UL (ref 3.81–5.12)
WBC # BLD AUTO: 8.78 THOUSAND/UL (ref 4.31–10.16)

## 2024-09-20 PROCEDURE — 99233 SBSQ HOSP IP/OBS HIGH 50: CPT | Performed by: INTERNAL MEDICINE

## 2024-09-20 PROCEDURE — 85027 COMPLETE CBC AUTOMATED: CPT | Performed by: INTERNAL MEDICINE

## 2024-09-20 PROCEDURE — 99233 SBSQ HOSP IP/OBS HIGH 50: CPT

## 2024-09-20 RX ORDER — MORPHINE SULFATE 15 MG/1
7.5 TABLET ORAL EVERY 6 HOURS PRN
Qty: 20 TABLET | Refills: 0 | Status: SHIPPED | OUTPATIENT
Start: 2024-09-20

## 2024-09-20 RX ORDER — DIAZEPAM 2 MG
2 TABLET ORAL ONCE AS NEEDED
Status: COMPLETED | OUTPATIENT
Start: 2024-09-20 | End: 2024-09-20

## 2024-09-20 RX ADMIN — Medication 250 MG: at 18:24

## 2024-09-20 RX ADMIN — DIAZEPAM 2.5 MG: 5 TABLET ORAL at 21:11

## 2024-09-20 RX ADMIN — DIAZEPAM 2 MG: 2 TABLET ORAL at 22:03

## 2024-09-20 RX ADMIN — AMOXICILLIN 500 MG: 500 CAPSULE ORAL at 21:11

## 2024-09-20 RX ADMIN — APIXABAN 10 MG: 5 TABLET, FILM COATED ORAL at 09:37

## 2024-09-20 RX ADMIN — ACETAMINOPHEN 975 MG: 325 TABLET ORAL at 09:45

## 2024-09-20 RX ADMIN — B-COMPLEX W/ C & FOLIC ACID TAB 1 TABLET: TAB at 09:36

## 2024-09-20 RX ADMIN — OXYCODONE HYDROCHLORIDE AND ACETAMINOPHEN 500 MG: 500 TABLET ORAL at 09:36

## 2024-09-20 RX ADMIN — LEVOTHYROXINE SODIUM 50 MCG: 50 TABLET ORAL at 05:53

## 2024-09-20 RX ADMIN — AMOXICILLIN 500 MG: 500 CAPSULE ORAL at 09:36

## 2024-09-20 RX ADMIN — DOCUSATE SODIUM 100 MG: 100 CAPSULE, LIQUID FILLED ORAL at 18:24

## 2024-09-20 RX ADMIN — ACETAMINOPHEN 975 MG: 325 TABLET ORAL at 21:11

## 2024-09-20 RX ADMIN — Medication 1000 UNITS: at 09:36

## 2024-09-20 RX ADMIN — LISINOPRIL 2.5 MG: 5 TABLET ORAL at 09:36

## 2024-09-20 RX ADMIN — Medication 250 MG: at 09:36

## 2024-09-20 RX ADMIN — DOCUSATE SODIUM 100 MG: 100 CAPSULE, LIQUID FILLED ORAL at 09:36

## 2024-09-20 NOTE — PROGRESS NOTES
Progress Note - Hospitalist   Name: Patsy Dallas 83 y.o. 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  CTA chest abdomen pelvis and echocardiogram without source.   Previously on warfarin for thromboembolism.  Vascular and hematology recommended anticoagulation.  Has been started on heparin infusion and transitioned to Eliquis especially given finding of chronic DVT on ipsilateral leg.  Patient refusing further anticoagulation due to hematuria understanding risk of limb ischemia  Discussion with ID and IR.  Gas in the area of retroperitoneal  ill-defined fluid collection is noted around surgical clips and this could be source of embolism.    I personally discussed with the patient's vascular surgery service at [facility]: Dr. Kofi Kee.  Nonsurgical candidate for any sort of stent removal or intervention as this may result in morbidity/mortality.  Recommendation was to continue with [place] and suppressive antibiotics.  Plan of care was also discussed with urology vascular surgery and ID here as well.  Disposition: Palliative approach.  Outpatient follow-up.  Abnormal CT scan  Patient with ongoing chills and rigors.  CT with right retroperitoneal fluid collection as well as medial thigh collection with enhancement  Right retroperitoneal fluid collection could be cause/source of embolism.  Blood cultures negative  IR aspirated medial thigh collection with low concerns for infection.  Pyuria discussed with ID.  No need for directed treatment at this  time  Nephrostomy tube bleed (HCC)  Hx of R ureteral stricture s/p stent c/b pyelonephritis and hydronephrosis  Has persistent bleeding from right nephrostomy, underwent clot evacuation and conversion of right PCN to PCNU and TURBT. 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 Follows with ID at [facility]  Unfortunately after getting 2 doses of anticoagulation the patient is now having gross hematuria in the right nephrostomy tube.  Plan of care was discussed with hematology urology and vascular surgery.  Patient declined further anticoagulation understanding risk of further limb ischemia  History of cervical cancer  History of recurrent metastatic squamous cervical cancer s/p hysterectomy; metastasis to lung with resection (2014) and SBRT (2018).  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 Ileostomy (2012)   Benign essential hypertension  Lisinopril 2.5 mg daily    VTE Pharmacologic Prophylaxis: VTE Score: 6 High Risk (Score >/= 5) - Pharmacological DVT Prophylaxis Contraindicated. Sequential Compression Devices Ordered.    Mobility:   Basic Mobility Inpatient Raw Score: 22  JH-HLM Goal: 7: Walk 25 feet or more  JH-HLM Achieved: 6: Walk 10 steps or more  JH-HLM  Goal achieved. Continue to encourage appropriate mobility.    Patient Centered Rounds: I have performed bedside rounds with nursing staff today.  Discussions with Specialists or Other Care Team Provider: Hematology urology vascular surgery infectious disease case management    Education and Discussions with Family / Patient: Updated  (daughter) at bedside.    Current Length of Stay: 5 day(s)  Current Patient Status: Inpatient   Certification Statement: The patient will continue to require additional inpatient hospital stay due to hematuria  Discharge Plan:     Code Status: Level 1 - Full Code    Subjective   Patient seen and examined.  Patient was initially scheduled for discharge but then started having gross hematuria out of right nephrostomy tube.  Does not have any new pains    Objective     Vitals:   Temp (24hrs), Av.3 °F (36.8 °C), Min:97.7 °F (36.5 °C), Max:98.7 °F (37.1 °C)    Temp:  [97.7 °F (36.5 °C)-98.7 °F (37.1 °C)] 98.5 °F (36.9 °C)  HR:  [80-82] 82  Resp:  [16] 16  BP: (107-127)/(58-61) 107/58  SpO2:  [96 %-99 %] 99 %  Body mass index is 24 kg/m².     Input and Output Summary (last 24 hours):     Intake/Output Summary (Last 24 hours) at 2024 1834  Last data filed at 2024 1801  Gross per 24 hour   Intake 580 ml   Output 900 ml   Net -320 ml       Physical Exam  Vitals reviewed.   Constitutional:       General: She is not in acute distress.     Appearance: Normal appearance.   HENT:      Head: Atraumatic.   Eyes:      General: No scleral icterus.  Cardiovascular:      Rate and Rhythm: Regular rhythm.      Heart sounds: Normal heart sounds.   Pulmonary:      Breath sounds: Decreased breath sounds present. No wheezing.   Abdominal:      General: Bowel sounds are normal.      Palpations: Abdomen is soft.      Tenderness: There is no guarding.      Comments: Ostomy present   Musculoskeletal:      Right lower leg: Edema present.      Left lower leg: Edema present.   Skin:      Findings: Bruising (Right big toe) present.   Neurological:      General: No focal deficit present.      Mental Status: She is alert and oriented to person, place, and time.   Psychiatric:         Mood and Affect: Mood normal.       Lines/Drains:  Lines/Drains/Airways       Active Status       Name Placement date Placement time Site Days    Nephrostomy Retrograde/ Ileal Conduit Right 09/16/24  1327  Right  4    Colostomy LLQ 03/10/21  1326  LLQ  1290                            Lab Results: I have reviewed the following results:    Results from last 7 days   Lab Units 09/20/24  0955 09/19/24  0524 09/18/24  0428 09/17/24 0448 09/16/24  1510 09/16/24  0434 09/15/24  1755   WBC Thousand/uL 8.78 11.07* 9.31   < >  --    < > 8.56   HEMOGLOBIN g/dL 10.2* 9.9* 9.2*   < >  --    < > 10.0*   PLATELETS Thousands/uL 439* 405* 335   < >  --    < > 296   MCV fL 89 90 90   < >  --    < > 89   INR   --   --   --   --  1.07  --  1.20*    < > = values in this interval not displayed.     Results from last 7 days   Lab Units 09/19/24  0524 09/18/24 0428 09/17/24 0448 09/16/24  0434   SODIUM mmol/L 134* 134* 136 136   POTASSIUM mmol/L 3.8 3.7 3.8 3.4*   CHLORIDE mmol/L 97 99 101 102   CO2 mmol/L 29 27 28 28   ANION GAP mmol/L 8 8 7 6   BUN mg/dL 17 16 15 16   CREATININE mg/dL 0.76 0.78 0.76 0.67   CALCIUM mg/dL 9.7 9.0 8.9 8.9   ALBUMIN g/dL  --   --  2.8* 2.9*   TOTAL BILIRUBIN mg/dL  --   --  0.26 0.27   ALK PHOS U/L  --   --  164* 209*   ALT U/L  --   --  22 27   AST U/L  --   --  24 29   EGFR ml/min/1.73sq m 71 69 71 80   GLUCOSE RANDOM mg/dL 124 136 96 118                      Results from last 7 days   Lab Units 09/18/24 0428   PROCALCITONIN ng/ml 0.92*                   Recent Cultures (last 7 days):   Results from last 7 days   Lab Units 09/19/24  1507 09/18/24  1737 09/18/24  1527 09/16/24  1519 09/16/24  1509   BLOOD CULTURE  Received in Microbiology Lab. Culture in Progress.  Received in Microbiology Lab. Culture in  Progress.  --   --  No Growth at 72 hrs. No Growth at 72 hrs.   GRAM STAIN RESULT   --  No Polys or Bacteria seen  --   --   --    URINE CULTURE   --   --  >100,000 cfu/ml Enterobacter cloacae*  40,000-49,000 cfu/ml Pseudomonas aeruginosa*  >100,000 cfu/ml Stenotrophomonas maltophilia*  --   --    BODY FLUID CULTURE, STERILE   --  No growth  --   --   --        Imaging:  Reviewed radiology reports from this admission including:  CTA chest pe study    Result Date: 9/17/2024  Impression: 1.  No identifiable acute abnormality to account for the patient's clinical presentation. No evidence of a pulmonary embolus. 2.  Mild to moderate aortic atherosclerotic disease, without evidence of aneurysm formation or ulcerating plaques. 3.  Mild diffuse nonspecific subcutaneous edema. Workstation performed: [id number]       CT head without contrast    Result Date: 9/15/2024  Impression: No acute intracranial abnormality.  Chronic microangiopathic changes. Workstation performed: [id number]       Last 24 Hours Medication List:     Current Facility-Administered Medications:     acetaminophen (TYLENOL) tablet 975 mg, Q6H PRN    aluminum-magnesium hydroxide-simethicone (MAALOX) oral suspension 30 mL, Q6H PRN    amoxicillin (AMOXIL) capsule 500 mg, Q12H KASSIDY    apixaban (ELIQUIS) tablet 10 mg, BID    ascorbic acid (VITAMIN C) tablet 500 mg, Daily    Cholecalciferol (VITAMIN D3) tablet 1,000 Units, Daily    diazepam (VALIUM) tablet 2.5 mg, Q6H PRN    dicyclomine (BENTYL) capsule 10 mg, TID PRN    docusate sodium (COLACE) capsule 100 mg, BID    HYDROmorphone HCl (DILAUDID) injection 0.2 mg, Q4H PRN    levothyroxine tablet 50 mcg, Early Morning    lisinopril (ZESTRIL) tablet 2.5 mg, Daily    morphine (MSIR) IR tablet 15 mg, Q6H PRN    multivitamin stress formula tablet 1 tablet, Daily    ondansetron (ZOFRAN-ODT) dispersible tablet 4 mg, Q6H PRN    polyethylene glycol (MIRALAX) packet 17 g, BID    saccharomyces boulardii (FLORASTOR) capsule  250 mg, BID    Administrative Statements   Today, Patient Was Seen By: Fernando Pacheco, DO  I have spent a total time of 50 minutes in caring for this patient on the day of the visit/encounter including Diagnostic results, Instructions for management, Patient and family education, Impressions, Counseling / Coordination of care, Documenting in the medical record, Reviewing / ordering tests, medicine, procedures  , Obtaining or reviewing history  , and Communicating with other healthcare professionals .    **Please Note: This note may have been constructed using a voice recognition system.**

## 2024-09-20 NOTE — ASSESSMENT & PLAN NOTE
Hx of R ureteral stricture s/p stent c/b pyelonephritis and hydronephrosis  Has persistent bleeding from right nephrostomy, underwent clot evacuation and conversion of right PCN to PCNU and TURBT. 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 Follows with ID at [facility]  Unfortunately after getting 2 doses of anticoagulation the patient is now having gross hematuria in the right nephrostomy tube.  Plan of care was discussed with hematology urology and vascular surgery.  Patient declined further anticoagulation understanding risk of further limb ischemia

## 2024-09-20 NOTE — ASSESSMENT & PLAN NOTE
Patient has a long history of normocytic anemia, follows with [facility] hematology, Dr. Mcdowell  She is status post IV iron treatments 1 month ago x 2 doses  Hemoglobin 10.2, MCV 89, WBC 8.78, platelets 439,000, serum iron 14, iron saturation 9%, ferritin 934    Discussion: Spoke with patient with her daughter Ann on speaker phone.  Patient is adamant about not wanting to take Eliquis as she believes it caused the bleeding in her nephrostomy bag.  Informed patient that she is this is her second VTE event, therefore, we are recommending anticoagulation indefinitely.  Since she is refusing anticoagulation at this time, recommend she follow-up with her hematologist at [facility] next week.  She has an appointment October 11, 2024, advised her to move up the appointment to next week.  She can discuss her options with her hematologist.    Patient is also noted to be iron deficient, ferritin elevated secondary to underlying inflammation as ferritin is an acute phase reactant.  She will follow-up with her hematologist for additional IV iron treatments.    Recommendations:  Patient to follow-up with her primary hematologist, Dr. Enamorado at [facility] for recommendations on anticoagulation versus serial ultrasounds.  She will also follow-up for additional iron treatments  If patient is still admitted, recommend IV iron infusion, Venofer 200 mg x 3 doses  Monitor CBC with differential daily  Transfuse if hemoglobin < 7.0 g/dL or actively bleeding    We will continue to follow along with you.    Case discussed with my supervising physician, Dr. Hermosillo.    ARNOLD Fung  Hematology-Oncology

## 2024-09-20 NOTE — ASSESSMENT & PLAN NOTE
9/18/2024 venous duplex revealed:  Evidence suggestive of chronic occlusive deep vein thrombosis noted throughout the femoral vein. Collateral vessels noted.  Subacute vs. chronic thrombus noted in the greater saphenous vein at the inguinal level.  Arterial ultrasound revealed:   diffuse lower extremity arterial occlusive disease throughout the  femoropopliteal segments without focal stenosis. Diffuse lower extremity arterial occlusive disease throughout the femoropopliteal segments without focal stenosis  Patient was started on heparin gtt and was being discharged on Eliquis 9/19/2024 at 2103 and her second dose was this morning at 0937  Patient was discharged and was noted to have bleeding in her nephrostomy bag, discharge was canceled.  Patient declining Eliquis due to the bleeding

## 2024-09-20 NOTE — ASSESSMENT & PLAN NOTE
Patient presented with progressing pain in her R leg and thigh and new ischemic changes to her right great toe.  Thigh without obvious external trauma or skin changes. There is a nonspecific fluid collection on CT in the medial upper right thigh with broad differential. Unclear if this is related to her recent bleeding. IR has performed drainage of bloody fluid, cultures are pending with no growth preliminarily. Consider this may be clot related. 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 LEADs with diffuse arterial occlusive disease but great toe pressures are within healing range. Consider RLE pain may have claudication component. Consider this may be representative of embolic condition, with possible retroperitoneal fluid collection/iliac artery stent contributing. The patient remains clinically stable without fever or leukocytosis. She has been on chronic amoxicillin from [facility] for #3/4, but there is no indication for additional antibiotic treatment at this time.   -continue amoxicillin suppression per [facility] ID  -no indication for other antibiotics at this time  -monitor CBCD and BMP  -follow up IR aspirate culture from R thigh  -monitor vitals  -given possibility of claudication related pain, recommend patient keep leg out in front of her when in bed instead of elevated

## 2024-09-20 NOTE — ASSESSMENT & PLAN NOTE
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   GI did not have any immediate recommendations for endoscopic evaluation    Results from last 7 days   Lab Units 09/21/24  0506 09/20/24  0955 09/19/24  0524 09/18/24  0428   HEMOGLOBIN g/dL 8.6* 10.2* 9.9* 9.2*

## 2024-09-20 NOTE — ASSESSMENT & PLAN NOTE
Patient with chronic hematuria from her nephrostomy and was found to have #3. Will monitor for now. Patient with polymicrobial growth in recent urine culture, however given presence of chronic PCN this is likely colonization. Continue suppressive antibiotic as above.  -resume follow up with [facility] ID after discharge

## 2024-09-20 NOTE — PROGRESS NOTES
Progress Note - Oncology-Medical   Name: Patsy Dallas 83 y.o. female I MRN: [id number]  Unit/Bed#: E2 -01 I Date of Admission: 9/15/2024   Date of Service: 9/20/2024 I Hospital Day: 5     Assessment & Plan  Acute deep vein thrombosis (DVT) of right lower extremity (HCC)  9/18/2024 venous duplex revealed:  Evidence suggestive of chronic occlusive deep vein thrombosis noted throughout the femoral vein. Collateral vessels noted.  Subacute vs. chronic thrombus noted in the greater saphenous vein at the inguinal level.  Arterial ultrasound revealed:   diffuse lower extremity arterial occlusive disease throughout the  femoropopliteal segments without focal stenosis. Diffuse lower extremity arterial occlusive disease throughout the femoropopliteal segments without focal stenosis  Patient was started on heparin gtt and was being discharged on Eliquis 9/19/2024 at 2103 and her second dose was this morning at 0937  Patient was discharged and was noted to have bleeding in her nephrostomy bag, discharge was canceled.  Patient declining Eliquis due to the bleeding  Chronic anemia  Patient has a long history of normocytic anemia, follows with [facility] hematology, Dr. Mcdowell  She is status post IV iron treatments 1 month ago x 2 doses  Hemoglobin 10.2, MCV 89, WBC 8.78, platelets 439,000, serum iron 14, iron saturation 9%, ferritin 934    Discussion: Spoke with patient with her daughter Ann on speaker phone.  Patient is adamant about not wanting to take Eliquis as she believes it caused the bleeding in her nephrostomy bag.  Informed patient that she is this is her second VTE event, therefore, we are recommending anticoagulation indefinitely.  Since she is refusing anticoagulation at this time, recommend she follow-up with her hematologist at [facility] next week.  She has an appointment October 11, 2024, advised her to move up the appointment to next week.  She can discuss her options with her hematologist.    Patient is also  noted to be iron deficient, ferritin elevated secondary to underlying inflammation as ferritin is an acute phase reactant.  She will follow-up with her hematologist for additional IV iron treatments.    Recommendations:  Patient to follow-up with her primary hematologist, Dr. Enamorado at [facility] for recommendations on anticoagulation versus serial ultrasounds.  She will also follow-up for additional iron treatments  If patient is still admitted, recommend IV iron infusion, Venofer 200 mg x 3 doses  Monitor CBC with differential daily  Transfuse if hemoglobin < 7.0 g/dL or actively bleeding    We will continue to follow along with you.    Case discussed with my supervising physician, Dr. Hermosillo.    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 Mcdowell. Been off of ASA of late      Cancer Staging:  Cancer Staging   History of cervical cancer  Staging form: Cervix Uteri, AJCC 8th Edition  - Clinical: FIGO Stage IB1 (cT1b1, cM0) - Signed by Anais Pitt PA-C on 4/25/2018      Molecular Testing:     Oncology History   History of cervical cancer    Initial Diagnosis    Cervical cancer (HCC)     1/11/2011 Surgery    ALISTAIR-BSO, pelvic and paraaortic lymph node dissection for presumed endometrial cancer  A. 3.2 cm SCC, outer third invasion, microscopically positive posterior margin, 32 negative lymph nodes, negative washings      Chemotherapy    Cisplatin with radiation      - 4/11/2011 Radiation    Whole pelvic radiotherapy      Adverse Reaction     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     3/6/2014 Surgery    Right upper lobe resection x 2  A. Recurrent SCC     9/1/2014 Surgery    Thoracotomy and resection of left pulmonary lesions      - 6/2018 Radiation    50Gy to left lung recurrence     Diffuse large B cell lymphoma (HCC)    Initial Diagnosis    Diffuse large B cell lymphoma (HCC)      - 12/6/2012 Chemotherapy    R-CHOP             Test Results:    Imaging: VAS VENOUS DUPLEX -LOWER LIMB UNILATERAL    Result Date: 9/18/2024  Narrative:  [facility] REPORT CLINICAL: Indications: Patient presents with right lower extremity pain. Operative History: 2024-07-25 Right Uretero-Iliac Fistula repair with EIA Stent Right iliac stent Risk Factors The patient has history of HTN.   CONCLUSION: Impression: RIGHT LOWER LIMB Evidence suggestive of chronic occlusive deep vein thrombosis noted throughout the femoral vein. Collateral vessels noted. Subacute vs. chronic thrombus noted in the greater saphenous vein at the inguinal level. No evidence of superficial thrombophlebitis noted. Doppler evaluation shows a normal response to augmentation maneuvers. Popliteal, posterior tibial and anterior tibial arterial Doppler waveform's are biphasic.  LEFT LOWER LIMB LIMITED Evaluation shows no evidence of thrombus in the common femoral vein. Doppler evaluation shows a normal response to augmentation maneuvers.  SIGNATURE: Electronically Signed by: MURPHY MARIANO on 2024-09-18 04:13:27 PM    VAS ARTERIAL DUPLEX- LOWER LIMB BILATERAL    Result Date: 9/17/2024  Narrative:  [facility] REPORT CLINICAL: Indications: Patient presents with right blue toe syndrome. Patient report right leg and foot pain. Operative History: 2024-07-25 Right Uretero-Iliac Fistula repair with EIA Stent Right iliac stent Risk Factors The patient has history of Metastatic Squamous Cervical Cancer with Chemotherapy and  HTN. Clinical Right  Pressure:  110/ mm Hg, Left Pressure:  110/ mm Hg.  FINDINGS:  Segment                Right                            Left                                          Impression      PSV (cm/s)  EDV  PSV (cm/s)  Dist ЮЛИЯ               Not visualized                               Prox. EIA - Stent                             167    0              Dist EIA - Stent                              143    0              Common Femoral Artery                         132               75  Prox Profunda                                  76               79  Prox SFA                                      117              103  Mid SFA                                        93               97  Dist SFA                                       75               65  Proximal Pop                                  102               81  Distal Pop                                     95               74  Tibioperoneal                                 111               56  Dist Post Tibial                              141               79  Prox. Ant. Tibial                              37               44  Dist. Ant. Tibial                             148               51  Dist Peroneal                                 143               44     CONCLUSION: Impression: RIGHT LOWER LIMB: The right external iliac stent appears patent. Diffuse lower extremity arterial occlusive disease throughout the femoropopliteal segments without focal stenosis. Evidence of tibioperoneal disease Ankle/Brachial index: 1.45 which is unreliable secondary to poorly compressible vessels. PVR/ PPG tracings are  dampened. Metatarsal pressure of 251 mmHg Great toe pressure of  0 mmHg, within the healing range Second toe pressure 62 mmHg is within the healing range.  LEFT LOWER LIMB: Diffuse lower extremity arterial occlusive disease throughout the femoropopliteal segments without focal stenosis. Evidence of tibioperoneal disease Ankle/Brachial index: 1.33  which is in the  normal range. PVR/ PPG tracings are normal Metatarsal pressure of 120 mmHg Great toe pressure of 73 mmHg, within the healing range  No previous study available for comparison.  SIGNATURE: Electronically Signed by: MIHIR ALBERTO MD on 2024-09-17 09:31:31 PM    Echo complete w/ contrast if indicated    Result Date: 9/17/2024  Narrative:   Left Ventricle: Left ventricular cavity size is normal. There is moderate asymmetric hypertrophy of the septal wall. The left ventricular ejection fraction is 68% by visual estimation. Systolic function is normal. Wall motion is normal. Diastolic function is mildly abnormal, consistent with grade I (abnormal) relaxation.   Right Ventricle: Right ventricular cavity size is normal. Systolic function is normal.   Left Atrium: The atrium is mildly dilated.   Aortic Valve: There is aortic valve sclerosis.   Mitral Valve: There is mild annular calcification.   Tricuspid Valve: There is trace regurgitation. Pulmonary artery systolic pressures are estimated at 36 mmHg.   There is no study for comparison.     CTA chest pe study    Result Date: 9/17/2024  Narrative: CTA - CHEST WITH IV CONTRAST - PULMONARY ANGIOGRAM INDICATION: Blue toe syndome, vascular surg recommends CTA eval for embolic phenomena. COMPARISON: CTA abdomen 9/15/2024 TECHNIQUE: CTA examination of the chest was performed using angiographic technique according to a protocol specifically tailored to evaluate for pulmonary embolism. Multiplanar 2D reformatted images were created from the source data. In addition, coronal  3D MIP postprocessing was performed on the acquisition scanner. Radiation dose length product (DLP) for this visit: 197 mGy-cm . This examination, like all CT scans performed in the [facility], was performed utilizing techniques to minimize radiation dose exposure, including the use of iterative reconstruction and automated exposure control. IV Contrast: 85 mL of iohexol (OMNIPAQUE) FINDINGS:  PULMONARY ARTERIAL TREE:  No pulmonary embolus. LUNGS: There is mild atelectasis and/or scarring along the wedge resection upper lobes, bilaterally. This is most pronounced within the left lung apex, where there is an overall wedge/platelike opacity. No lobar consolidation or diffuse interstitial lung  disease. There is mild focal pleural-parenchymal scarring along the medial right lower lobe (series 3, image 95), with an associated 4 mm nodular focus. The overall region measures 0.8 x 1.7 cm on series 3 image 78. PLEURA: See above. HEART/GREAT VESSELS: The heart is borderline enlarged, with at least moderate multivessel coronary artery calcifications. Mild to moderate aortic atherosclerotic changes, without aneurysm formation. No visible ulcerating plaques. MEDIASTINUM AND GISELL: Unremarkable. CHEST WALL AND LOWER NECK: Moderate diffuse subcutaneous edema. VISUALIZED STRUCTURES IN THE UPPER ABDOMEN: Mild fullness of the left renal collecting system. OSSEOUS STRUCTURES: Mild to moderate multilevel thoracic spondylosis and chronic appearing mild to moderate T11 superior endplate compression deformity.     Impression: 1.  No identifiable acute abnormality to account for the patient's clinical presentation. No evidence of a pulmonary embolus. 2.  Mild to moderate aortic atherosclerotic disease, without evidence of aneurysm formation or ulcerating plaques. 3.  Mild diffuse nonspecific subcutaneous edema. Workstation performed: [id number]     CTA abdominal w run off w wo contrast    Result Date: 9/16/2024  Narrative: CT ANGIOGRAM OF THE AORTA AND LOWER EXTREMITIES WITH IV CONTRAST INDICATION: Discolored right foot COMPARISON: Outside CT November 8, 2019 TECHNIQUE: CT angiogram examination of the abdomen, pelvis, and lower extremities was performed according to standard protocol with intravenous contrast. This examination, like all CT scans performed in the [facility], was performed utilizing techniques to  minimize radiation dose exposure, including the use of iterative reconstruction and automated exposure control. 3D reconstructions were performed an independent workstation, and are supplied for review. Rad dose 1727.41 mGy-cm IV Contrast: 110 mL of iohexol (OMNIPAQUE) Enteric Contrast: Not administered. FINDINGS: VESSELS: The abdominal aorta is normal in caliber. There is moderate diffuse calcification. No soft plaque or thrombus is seen. No significant SMA stenosis. Common and external iliac arteries bilaterally are widely patent. Proximal right external iliac stent remains patent without significant in-stent restenosis. Bilateral hypogastric arteries are patent. No source of distal embolization is seen. There is a small amount of posterior calcified noncalcified plaque involving both common femoral arteries. In the right leg, superficial femoral and profunda femoris arteries are patent with no evidence of macro emboli. There is no significant right SFA or popliteal stenosis. There is single vessel runoff via a well-developed peroneal artery which contains no  significant stenosis. There is reconstitution of the plantar arteries. Anterior tibial artery is diminutive in size in the proximal mid calf and seems to occlude in the mid calf. There is venous contamination compromising the study however distal anterior tibial artery does not appear to be patent. There may be a tiny dorsalis pedis artery. In the left leg, no significant infrainguinal occlusive disease. There is two-vessel runoff through the posterior tibial and peroneal arteries. The anterior tibial artery likely occludes at the level of the ankle. Dorsalis pedis artery not clearly identified. OTHER FINDINGS ABDOMEN LOWER CHEST: No clinically significant abnormality in the visualized lower chest. LIVER/BILIARY TREE: Unremarkable. GALLBLADDER: No calcified gallstones. No pericholecystic inflammatory change. SPLEEN: Unremarkable. PANCREAS: Unremarkable.  ADRENAL GLANDS: Unremarkable. KIDNEYS/URETERS: Right nephrostomy in place without hydronephrosis PELVIS REPRODUCTIVE ORGANS: Unremarkable for patient's age. URINARY BLADDER: Contains a small locule of gas ADDITIONAL ABDOMINAL AND PELVIC STRUCTURES STOMACH AND BOWEL: Status post left colectomy. Left lower quadrant colostomy. No bowel dilatation. APPENDIX: No findings to suggest appendicitis. ABDOMINOPELVIC CAVITY: Multiple clips in the retroperitoneum. Ill-defined fluid collection measuring approximately 3 x 1.5 cm in the region of the right iliopsoas muscle and therefore likely retroperitoneal adjacent to several retroperitoneal clips. ABDOMINAL WALL/INGUINAL REGIONS: Unremarkable. BONES: No acute fracture or suspicious osseous lesion. SOFT TISSUES: There is a 2.5 cm collection with some peripheral enhancement in the medial musculature of the upper right thigh. Diffuse subcutaneous edema bilaterally. Findings briefly discussed with Dr. Pacheco via secure chat at 10:00 a.m. 9/16/2024.     Impression: 1. No evidence of macro embolic disease or other acute arterial process. In the right leg, no significant proximal occlusive disease. There is moderate infrapopliteal disease with single vessel peroneal runoff. 2. No significant aortoiliac or left infrainguinal occlusive disease 3. No culprit lesion seen that would account for thromboembolic disease 4. 2.5 cm collection medial upper right thigh musculature with some peripheral enhancement. Differential includes chronic seroma, evolving hematoma, or abscess in the proper clinical setting. 5. Ill-defined fluid collection in the right retroperitoneum which may be intramuscular adjacent to several retroperitoneal clips. This could represent chronic postop collection. Again, in the proper clinical setting cannot exclude acute infectious process. If there is any clinical concerns for systemic infection, recommend repeat abdomen pelvis CT with IV and oral contrast. Workstation  performed: [id number]     CT head without contrast    Result Date: 9/15/2024  Narrative: CT BRAIN - WITHOUT CONTRAST INDICATION:   slowed speech, history of prior stroke. COMPARISON:  None. TECHNIQUE:  CT examination of the brain was performed.  Multiplanar 2D reformatted images were created from the source data. Radiation dose length product (DLP) for this visit:  893.79 mGy-cm .  This examination, like all CT scans performed in the [facility], was performed utilizing techniques to minimize radiation dose exposure, including the use of iterative  reconstruction and automated exposure control. IMAGE QUALITY:  Diagnostic. FINDINGS: PARENCHYMA: Decreased attenuation is noted in periventricular and subcortical white matter demonstrating an appearance that is statistically most likely to represent mild microangiopathic change. No CT signs of acute infarction.  No intracranial mass, mass effect or midline shift.  No acute parenchymal hemorrhage. VENTRICLES AND EXTRA-AXIAL SPACES:  Normal for the patient's age. VISUALIZED ORBITS: Globes are aphakic PARANASAL SINUSES: Normal visualized paranasal sinuses. CALVARIUM AND EXTRACRANIAL SOFT TISSUES:  Normal.     Impression: No acute intracranial abnormality.  Chronic microangiopathic changes. Workstation performed: [id number]     IR NEPHROSTOMY TUBE CHANGE RIGHT    Result Date: 8/24/2024  Narrative: This result has an attachment that is not available. Procedure: Image guided right nephrostomy tube exchange. Clinical History: Chronic right nephroureteral tube drainage. Exchange requested, with conversion back to regular PCN. Antibiotics/medications given: 1% Lidocaine 5 mL Estimated Blood Loss (mL): less than 5 Fluoroscopy time (minutes): 0.2 CAK Dose (mGy): 1.57 Contrast volume (mL): 5 Contrast type: Omnipaque 300 Contrast route: Nephrostomy tubes. Informed consent for the procedure was obtained. A time-out was performed with all team members present and in  agreement, confirming the correct patient, correct procedure, and correct side/site. Technique: The patient was placed prone and the right flank and indwelling right nephroureteral tube was prepped and draped, using ChloraPrep, which was allowed to dry before sterile draping applied in the usual sterile fashion. Maximum sterile barrier technique was used, including the use of a cap, mask, sterile gown, gloves, and full body drape, after recommended hand hygiene and aseptic techniques. A small amount of contrast was injected through the nephroureteral tube and a limited nephrostogram was performed. The catheter was exchanged over a wire for a 10.2 Urdu nephrostomy catheter (Cook M[facility]). The catheter was gently flushed with sterile saline, secured to the skin with nonabsorbable suture, returned to external bag drainage and a sterile dressing applied. The patient tolerated the procedure well. Plan: Patient will return in 3 months for routine nephrostomy tube exchange.    Impression: Impression: Image guided exchange/conversion of right nephroureteral catheter to nephrostomy tube. Workstation:[id number]      Labs:   Lab Results   Component Value Date    WBC 8.78 09/20/2024    HGB 10.2 (L) 09/20/2024    HCT 32.0 (L) 09/20/2024    MCV 89 09/20/2024     (H) 09/20/2024     Lab Results   Component Value Date    K 3.8 09/19/2024    CL 97 09/19/2024    CO2 29 09/19/2024    BUN 17 09/19/2024    CREATININE 0.76 09/19/2024    CALCIUM 9.7 09/19/2024    CORRECTEDCA 9.9 09/17/2024    AST 24 09/17/2024    ALT 22 09/17/2024    ALKPHOS 164 (H) 09/17/2024    EGFR 71 09/19/2024       Past Medical History:   Past Medical History:   Diagnosis Date    Cervical cancer (HCC) 01/16/2011    History of chemotherapy     age 70    History of DVT (deep vein thrombosis)     History of radiation therapy     age 70       Past Surgical History:   Procedure Laterality Date    BREAST EXCISIONAL BIOPSY Left 1985    benign    HYSTERECTOMY      age69     OOPHORECTOMY Bilateral     age 70       Family History   Problem Relation Age of Onset    No Known Problems Mother     No Known Problems Father     No Known Problems Daughter     No Known Problems Maternal Grandmother     No Known Problems Maternal Grandfather     No Known Problems Paternal Grandmother     No Known Problems Paternal Grandfather        Social History     Socioeconomic History    Marital status: /Civil Union     Spouse name: None    Number of children: None    Years of education: None    Highest education level: None   Occupational History    None   Tobacco Use    Smoking status: Never    Smokeless tobacco: Never   Vaping Use    Vaping status: None   Substance and Sexual Activity    Alcohol use: Never    Drug use: No    Sexual activity: None   Other Topics Concern    None   Social History Narrative    None     Social Determinants of Health     Financial Resource Strain: Low Risk  (7/25/2024)    Received from [facility]    Overall Financial Resource Strain (CARDIA)     Difficulty of Paying Living Expenses: Not hard at all   Food Insecurity: No Food Insecurity (9/19/2024)    Hunger Vital Sign     Worried About Running Out of Food in the Last Year: Never true     Ran Out of Food in the Last Year: Never true   Transportation Needs: No Transportation Needs (9/19/2024)    PRAPARE - Transportation     Lack of Transportation (Medical): No     Lack of Transportation (Non-Medical): No   Physical Activity: Not on file   Stress: Not on file   Social Connections: Unknown (2/28/2024)    Received from [facility]    [employer]     Social Network: Not on file   Intimate Partner Violence: Not At Risk (7/25/2024)    Received from [facility]    Humiliation, Afraid, Rape, and Kick questionnaire     Fear of Current or Ex-Partner: No     Emotionally Abused: No     Physically Abused: No     Sexually Abused: No   Housing Stability: Low Risk  (9/19/2024)    Housing Stability  Vital Sign     Unable to Pay for Housing in the Last Year: No     Number of Times Moved in the Last Year: 0     Homeless in the Last Year: No         Current Facility-Administered Medications:     acetaminophen (TYLENOL) tablet 975 mg, 975 mg, Oral, Q6H PRN, ARNOLD Lama, 975 mg at 09/20/24 0945    aluminum-magnesium hydroxide-simethicone (MAALOX) oral suspension 30 mL, 30 mL, Oral, Q6H PRN, ARNOLD Lama    amoxicillin (AMOXIL) capsule 500 mg, 500 mg, Oral, Q12H KASSIDY, ARNOLD Lama, 500 mg at 09/20/24 0936    apixaban (ELIQUIS) tablet 10 mg, 10 mg, Oral, BID, Fernando Pacheco DO, 10 mg at 09/20/24 0937    ascorbic acid (VITAMIN C) tablet 500 mg, 500 mg, Oral, Daily, ARNOLD Lama, 500 mg at 09/20/24 0936    Cholecalciferol (VITAMIN D3) tablet 1,000 Units, 1,000 Units, Oral, Daily, ARNOLD Lama, 1,000 Units at 09/20/24 0936    diazepam (VALIUM) tablet 2.5 mg, 2.5 mg, Oral, Q6H PRN, ARNOLD Lama, 2.5 mg at 09/19/24 2106    dicyclomine (BENTYL) capsule 10 mg, 10 mg, Oral, TID PRN, ARNOLD Lama    docusate sodium (COLACE) capsule 100 mg, 100 mg, Oral, BID, ARNOLD Lama, 100 mg at 09/20/24 0936    HYDROmorphone HCl (DILAUDID) injection 0.2 mg, 0.2 mg, Intravenous, Q4H PRN, ARNOLD Lama    levothyroxine tablet 50 mcg, 50 mcg, Oral, Early Morning, ARNOLD Lama, 50 mcg at 09/20/24 0553    lisinopril (ZESTRIL) tablet 2.5 mg, 2.5 mg, Oral, Daily, ARNOLD Lama, 2.5 mg at 09/20/24 0936    morphine (MSIR) IR tablet 15 mg, 15 mg, Oral, Q6H PRN, ARNOLD Lama, 15 mg at 09/16/24 2059    multivitamin stress formula tablet 1 tablet, 1 tablet, Oral, Daily, ARNOLD Lama, 1 tablet at 09/20/24 0936    ondansetron (ZOFRAN-ODT) dispersible tablet 4 mg, 4 mg, Oral, Q6H PRN, ARNOLD Lama    polyethylene glycol (MIRALAX) packet 17 g, 17 g, Oral, BID, Josué Freedman MD, 17 g at 09/18/24  2049    saccharomyces boulardii (FLORASTOR) capsule 250 mg, 250 mg, Oral, BID, Shelby Campos, DUANENP, 250 mg at 09/20/24 0936      Medications Prior to Admission:     amoxicillin (AMOXIL) 500 mg capsule    docusate sodium (COLACE) 50 mg capsule    lisinopril (ZESTRIL) 2.5 mg tablet    acetaminophen (TYLENOL) 650 mg CR tablet    ascorbic acid (VITAMIN C) 1000 MG tablet    aspirin (ECOTRIN LOW STRENGTH) 81 mg EC tablet    ASPIRIN 81 PO    BD PosiFlush 0.9 % SOLN    Calcium Polycarbophil (Fiber) 625 MG TABS    CALCIUM-MAGNESIUM-VITAMIN D PO    cefpodoxime (VANTIN) 200 mg tablet    Cholecalciferol (VITAMIN D3) 2000 units capsule    cycloSPORINE (Restasis) 0.05 % ophthalmic emulsion    DAILY MULTIPLE VITAMINS PO    diazepam (VALIUM) 5 mg tablet    dicyclomine (BENTYL) 10 mg capsule    famotidine (PEPCID) 20 mg tablet    fluticasone (FLONASE) 50 mcg/act nasal spray    Garlic 100 MG TABS    Glutathione POWD    guaiFENesin (MUCINEX) 600 mg 12 hr tablet    METRONIDAZOLE, TOPICAL, 0.75 % LOTN    Omega-3 Fatty Acids (FISH OIL) 1,000 mg    omeprazole (PriLOSEC) 20 mg delayed release capsule    ondansetron (ZOFRAN) 4 mg tablet    Probiotic Product (PROBIOTIC-10) CAPS    sodium chloride (OCEAN) 0.65 % nasal spray    Specialty Vitamins Products (ONE-A-DAY BONE STRENGTH PO)    SYNTHROID 50 MCG tablet    TURMERIC CURCUMIN PO    Ubiquinol 100 MG CAPS    Allergies   Allergen Reactions    Iodinated Contrast Media Diarrhea     Severe! PO oral contrast    Severe    Iron Sucrose Anaphylaxis, Other (See Comments) and Shortness Of Breath     Specifically venafor brand; labored breathing, chest discomfort, weakness, facial flush. Therahem is ok    Specifically Venofer brand; labored breathing, chest discomfort, weakness, facial flush.     Therahem is ok    Bee Pollen Allergic Rhinitis    Ibuprofen GI Bleeding    Misc. Sulfonamide Containing Compounds Hives    Wound Dressing Adhesive Other (See Comments)     Blisters    Medical Tape  "Itching, Rash and Other (See Comments)    Other Itching, Other (See Comments), Diarrhea and Hives     BAND AIDES RASH    Blisters    Oral CT contrast; tolerates IV contrast    Oxybutynin Rash and Other (See Comments)    Pollen Extract Allergic Rhinitis and Other (See Comments)    Sulfa Antibiotics Rash and Other (See Comments)    Sulfur Rash    Wound Dressings Rash     BAND AIDS RASH AND BLISTERS         Physical Exam:     /58 (BP Location: Right arm)   Pulse 82   Temp 98.5 °F (36.9 °C) (Oral)   Resp 16   Ht 5' 1\" (1.549 m)   Wt 57.6 kg (127 lb)   SpO2 99%   BMI 24.00 kg/m²       Recent Results (from the past 48 hour(s))   Basic metabolic panel    Collection Time: 09/19/24  5:24 AM   Result Value Ref Range    Sodium 134 (L) 135 - 147 mmol/L    Potassium 3.8 3.5 - 5.3 mmol/L    Chloride 97 96 - 108 mmol/L    CO2 29 21 - 32 mmol/L    ANION GAP 8 4 - 13 mmol/L    BUN 17 5 - 25 mg/dL    Creatinine 0.76 0.60 - 1.30 mg/dL    Glucose 124 65 - 140 mg/dL    Calcium 9.7 8.4 - 10.2 mg/dL    eGFR 71 ml/min/1.73sq m   CBC    Collection Time: 09/19/24  5:24 AM   Result Value Ref Range    WBC 11.07 (H) 4.31 - 10.16 Thousand/uL    RBC 3.42 (L) 3.81 - 5.12 Million/uL    Hemoglobin 9.9 (L) 11.5 - 15.4 g/dL    Hematocrit 30.8 (L) 34.8 - 46.1 %    MCV 90 82 - 98 fL    MCH 28.9 26.8 - 34.3 pg    MCHC 32.1 31.4 - 37.4 g/dL    RDW 17.4 (H) 11.6 - 15.1 %    Platelets 405 (H) 149 - 390 Thousands/uL    MPV 9.0 8.9 - 12.7 fL   APTT    Collection Time: 09/19/24  5:24 AM   Result Value Ref Range    PTT 62 (H) 23 - 34 seconds   TIBC Panel (incl. Iron, TIBC, % Iron Saturation)    Collection Time: 09/19/24  5:24 AM   Result Value Ref Range    Iron Saturation 9 (L) 15 - 50 %    TIBC 160 (L) 250 - 450 ug/dL    Iron 14 (L) 50 - 212 ug/dL    UIBC 146 (L) 155 - 355 ug/dL   Ferritin    Collection Time: 09/19/24  5:24 AM   Result Value Ref Range    Ferritin 934 (H) 11 - 307 ng/mL   Blood culture    Collection Time: 09/19/24  3:07 PM    " Specimen: Arm, Right; Blood   Result Value Ref Range    Blood Culture Received in Microbiology Lab. Culture in Progress.    Blood culture    Collection Time: 09/19/24  3:07 PM    Specimen: Arm, Left; Blood   Result Value Ref Range    Blood Culture Received in Microbiology Lab. Culture in Progress.    CBC and Platelet    Collection Time: 09/20/24  9:55 AM   Result Value Ref Range    WBC 8.78 4.31 - 10.16 Thousand/uL    RBC 3.60 (L) 3.81 - 5.12 Million/uL    Hemoglobin 10.2 (L) 11.5 - 15.4 g/dL    Hematocrit 32.0 (L) 34.8 - 46.1 %    MCV 89 82 - 98 fL    MCH 28.3 26.8 - 34.3 pg    MCHC 31.9 31.4 - 37.4 g/dL    RDW 17.5 (H) 11.6 - 15.1 %    Platelets 439 (H) 149 - 390 Thousands/uL    MPV 8.7 (L) 8.9 - 12.7 fL       VAS VENOUS DUPLEX -LOWER LIMB UNILATERAL    Result Date: 9/18/2024  Narrative:  [facility] REPORT [facility]INICAL: Indications: Patient presents with right lower extremity pain. Operative History: 2024-07-25 Right Uretero-Iliac Fistula repair with EIA Stent Right iliac stent Risk Factors The patient has history of HTN.   CONCLUSION: Impression: RIGHT LOWER LIMB Evidence suggestive of chronic occlusive deep vein thrombosis noted throughout the femoral vein. Collateral vessels noted. Subacute vs. chronic thrombus noted in the greater saphenous vein at the inguinal level. No evidence of superficial thrombophlebitis noted. Doppler evaluation shows a normal response to augmentation maneuvers. Popliteal, posterior tibial and anterior tibial arterial Doppler waveform's are biphasic.  LEFT LOWER LIMB LIMITED Evaluation shows no evidence of thrombus in the common femoral vein. Doppler evaluation shows a normal response to augmentation maneuvers.  SIGNATURE: Electronically Signed by: MURPHY MARIANO on 2024-09-18 04:13:27 PM    VAS ARTERIAL DUPLEX- LOWER LIMB BILATERAL    Result Date: 9/17/2024  Narrative:  [facility]PORT [facility]INICAL: Indications: Patient presents with right blue toe syndrome. Patient  report right leg and foot pain. Operative History: 2024-07-25 Right Uretero-Iliac Fistula repair with EIA Stent Right iliac stent Risk Factors The patient has history of Metastatic Squamous Cervical Cancer with Chemotherapy and  HTN. Clinical Right Pressure:  110/ mm Hg, Left Pressure:  110/ mm Hg.  FINDINGS:  Segment                Right                            Left                                          Impression      PSV (cm/s)  EDV  PSV (cm/s)  Dist ЮЛИЯ               Not visualized                               Prox. EIA - Stent                             167    0              Dist EIA - Stent                              143    0              Common Femoral Artery                         132               75  Prox Profunda                                  76               79  Prox SFA                                      117              103  Mid SFA                                        93               97  Dist SFA                                       75               65  Proximal Pop                                  102               81  Distal Pop                                     95               74  Tibioperoneal                                 111               56  Dist Post Tibial                              141               79  Prox. Ant. Tibial                              37               44  Dist. Ant. Tibial                             148               51  Dist Peroneal                                 143               44     CONCLUSION: Impression: RIGHT LOWER LIMB: The right external iliac stent appears patent. Diffuse lower extremity arterial occlusive disease throughout the femoropopliteal segments without focal stenosis. Evidence of tibioperoneal disease Ankle/Brachial index: 1.45 which is unreliable secondary to poorly compressible vessels. PVR/ PPG tracings are  dampened. Metatarsal pressure of 251 mmHg Great toe pressure of  0 mmHg, within the healing range Second toe pressure  62 mmHg is within the healing range.  LEFT LOWER LIMB: Diffuse lower extremity arterial occlusive disease throughout the femoropopliteal segments without focal stenosis. Evidence of tibioperoneal disease Ankle/Brachial index: 1.33  which is in the normal range. PVR/ PPG tracings are normal Metatarsal pressure of 120 mmHg Great toe pressure of 73 mmHg, within the healing range  No previous study available for comparison.  SIGNATURE: Electronically Signed by: MIHIR ALBERTO MD on 2024-09-17 09:31:31 PM    Echo complete w/ contrast if indicated    Result Date: 9/17/2024  Narrative:   Left Ventricle: Left ventricular cavity size is normal. There is moderate asymmetric hypertrophy of the septal wall. The left ventricular ejection fraction is 68% by visual estimation. Systolic function is normal. Wall motion is normal. Diastolic function is mildly abnormal, consistent with grade I (abnormal) relaxation.   Right Ventricle: Right ventricular cavity size is normal. Systolic function is normal.   Left Atrium: The atrium is mildly dilated.   Aortic Valve: There is aortic valve sclerosis.   Mitral Valve: There is mild annular calcification.   Tricuspid Valve: There is trace regurgitation. Pulmonary artery systolic pressures are estimated at 36 mmHg.   There is no study for comparison.     CTA chest pe study    Result Date: 9/17/2024  Narrative: CTA - CHEST WITH IV CONTRAST - PULMONARY ANGIOGRAM INDICATION: Blue toe syndome, vascular surg recommends CTA eval for embolic phenomena. COMPARISON: CTA abdomen 9/15/2024 TECHNIQUE: CTA examination of the chest was performed using angiographic technique according to a protocol specifically tailored to evaluate for pulmonary embolism. Multiplanar 2D reformatted images were created from the source data. In addition, coronal  3D MIP postprocessing was performed on the acquisition scanner. Radiation dose length product (DLP) for this visit: 197 mGy-cm . This examination, like all CT scans  performed in the [facility], was performed utilizing techniques to minimize radiation dose exposure, including the use of iterative reconstruction and automated exposure control. IV Contrast: 85 mL of iohexol (OMNIPAQUE) FINDINGS: PULMONARY ARTERIAL TREE:  No pulmonary embolus. LUNGS: There is mild atelectasis and/or scarring along the wedge resection upper lobes, bilaterally. This is most pronounced within the left lung apex, where there is an overall wedge/platelike opacity. No lobar consolidation or diffuse interstitial lung  disease. There is mild focal pleural-parenchymal scarring along the medial right lower lobe (series 3, image 95), with an associated 4 mm nodular focus. The overall region measures 0.8 x 1.7 cm on series 3 image 78. PLEURA: See above. HEART/GREAT VESSELS: The heart is borderline enlarged, with at least moderate multivessel coronary artery calcifications. Mild to moderate aortic atherosclerotic changes, without aneurysm formation. No visible ulcerating plaques. MEDIASTINUM AND GISELL: Unremarkable. CHEST WALL AND LOWER NECK: Moderate diffuse subcutaneous edema. VISUALIZED STRUCTURES IN THE UPPER ABDOMEN: Mild fullness of the left renal collecting system. OSSEOUS STRUCTURES: Mild to moderate multilevel thoracic spondylosis and chronic appearing mild to moderate T11 superior endplate compression deformity.     Impression: 1.  No identifiable acute abnormality to account for the patient's clinical presentation. No evidence of a pulmonary embolus. 2.  Mild to moderate aortic atherosclerotic disease, without evidence of aneurysm formation or ulcerating plaques. 3.  Mild diffuse nonspecific subcutaneous edema. Workstation performed: [id number]     CTA abdominal w run off w wo contrast    Result Date: 9/16/2024  Narrative: CT ANGIOGRAM OF THE AORTA AND LOWER EXTREMITIES WITH IV CONTRAST INDICATION: Discolored right foot COMPARISON: Outside CT November 8, 2019 TECHNIQUE: CT angiogram  examination of the abdomen, pelvis, and lower extremities was performed according to standard protocol with intravenous contrast. This examination, like all CT scans performed in the [facility], was performed utilizing techniques to minimize radiation dose exposure, including the use of iterative reconstruction and automated exposure control. 3D reconstructions were performed an independent workstation, and are supplied for review. Rad dose 1727.41 mGy-cm IV Contrast: 110 mL of iohexol (OMNIPAQUE) Enteric Contrast: Not administered. FINDINGS: VESSELS: The abdominal aorta is normal in caliber. There is moderate diffuse calcification. No soft plaque or thrombus is seen. No significant SMA stenosis. Common and external iliac arteries bilaterally are widely patent. Proximal right external iliac stent remains patent without significant in-stent restenosis. Bilateral hypogastric arteries are patent. No source of distal embolization is seen. There is a small amount of posterior calcified noncalcified plaque involving both common femoral arteries. In the right leg, superficial femoral and profunda femoris arteries are patent with no evidence of macro emboli. There is no significant right SFA or popliteal stenosis. There is single vessel runoff via a well-developed peroneal artery which contains no  significant stenosis. There is reconstitution of the plantar arteries. Anterior tibial artery is diminutive in size in the proximal mid calf and seems to occlude in the mid calf. There is venous contamination compromising the study however distal anterior tibial artery does not appear to be patent. There may be a tiny dorsalis pedis artery. In the left leg, no significant infrainguinal occlusive disease. There is two-vessel runoff through the posterior tibial and peroneal arteries. The anterior tibial artery likely occludes at the level of the ankle. Dorsalis pedis artery not clearly identified. OTHER FINDINGS  ABDOMEN LOWER CHEST: No clinically significant abnormality in the visualized lower chest. LIVER/BILIARY TREE: Unremarkable. GALLBLADDER: No calcified gallstones. No pericholecystic inflammatory change. SPLEEN: Unremarkable. PANCREAS: Unremarkable. ADRENAL GLANDS: Unremarkable. KIDNEYS/URETERS: Right nephrostomy in place without hydronephrosis PELVIS REPRODUCTIVE ORGANS: Unremarkable for patient's age. URINARY BLADDER: Contains a small locule of gas ADDITIONAL ABDOMINAL AND PELVIC STRUCTURES STOMACH AND BOWEL: Status post left colectomy. Left lower quadrant colostomy. No bowel dilatation. APPENDIX: No findings to suggest appendicitis. ABDOMINOPELVIC CAVITY: Multiple clips in the retroperitoneum. Ill-defined fluid collection measuring approximately 3 x 1.5 cm in the region of the right iliopsoas muscle and therefore likely retroperitoneal adjacent to several retroperitoneal clips. ABDOMINAL WALL/INGUINAL REGIONS: Unremarkable. BONES: No acute fracture or suspicious osseous lesion. SOFT TISSUES: There is a 2.5 cm collection with some peripheral enhancement in the medial musculature of the upper right thigh. Diffuse subcutaneous edema bilaterally. Findings briefly discussed with Dr. Pacheco via secure chat at 10:00 a.m. 9/16/2024.     Impression: 1. No evidence of macro embolic disease or other acute arterial process. In the right leg, no significant proximal occlusive disease. There is moderate infrapopliteal disease with single vessel peroneal runoff. 2. No significant aortoiliac or left infrainguinal occlusive disease 3. No culprit lesion seen that would account for thromboembolic disease 4. 2.5 cm collection medial upper right thigh musculature with some peripheral enhancement. Differential includes chronic seroma, evolving hematoma, or abscess in the proper clinical setting. 5. Ill-defined fluid collection in the right retroperitoneum which may be intramuscular adjacent to several retroperitoneal clips. This could  represent chronic postop collection. Again, in the proper clinical setting cannot exclude acute infectious process. If there is any clinical concerns for systemic infection, recommend repeat abdomen pelvis CT with IV and oral contrast. Workstation performed: [id number]     CT head without contrast    Result Date: 9/15/2024  Narrative: CT BRAIN - WITHOUT CONTRAST INDICATION:   slowed speech, history of prior stroke. COMPARISON:  None. TECHNIQUE:  CT examination of the brain was performed.  Multiplanar 2D reformatted images were created from the source data. Radiation dose length product (DLP) for this visit:  893.79 mGy-cm .  This examination, like all CT scans performed in the [facility], was performed utilizing techniques to minimize radiation dose exposure, including the use of iterative  reconstruction and automated exposure control. IMAGE QUALITY:  Diagnostic. FINDINGS: PARENCHYMA: Decreased attenuation is noted in periventricular and subcortical white matter demonstrating an appearance that is statistically most likely to represent mild microangiopathic change. No CT signs of acute infarction.  No intracranial mass, mass effect or midline shift.  No acute parenchymal hemorrhage. VENTRICLES AND EXTRA-AXIAL SPACES:  Normal for the patient's age. VISUALIZED ORBITS: Globes are aphakic PARANASAL SINUSES: Normal visualized paranasal sinuses. CALVARIUM AND EXTRACRANIAL SOFT TISSUES:  Normal.     Impression: No acute intracranial abnormality.  Chronic microangiopathic changes. Workstation performed: [id number]     IR NEPHROSTOMY TUBE CHANGE RIGHT    Result Date: 8/24/2024  Narrative: This result has an attachment that is not available. Procedure: Image guided right nephrostomy tube exchange. Clinical History: Chronic right nephroureteral tube drainage. Exchange requested, with conversion back to regular PCN. Antibiotics/medications given: 1% Lidocaine 5 mL Estimated Blood Loss (mL): less than 5  Fluoroscopy time (minutes): 0.2 CAK Dose (mGy): 1.57 Contrast volume (mL): 5 Contrast type: Omnipaque 300 Contrast route: Nephrostomy tubes. Informed consent for the procedure was obtained. A time-out was performed with all team members present and in agreement, confirming the correct patient, correct procedure, and correct side/site. Technique: The patient was placed prone and the right flank and indwelling right nephroureteral tube was prepped and draped, using ChloraPrep, which was allowed to dry before sterile draping applied in the usual sterile fashion. Maximum sterile barrier technique was used, including the use of a cap, mask, sterile gown, gloves, and full body drape, after recommended hand hygiene and aseptic techniques. A small amount of contrast was injected through the nephroureteral tube and a limited nephrostogram was performed. The catheter was exchanged over a wire for a 10.2 Croatian nephrostomy catheter ([facility]). The catheter was gently flushed with sterile saline, secured to the skin with nonabsorbable suture, returned to external bag drainage and a sterile dressing applied. The patient tolerated the procedure well. Plan: Patient will return in 3 months for routine nephrostomy tube exchange.    Impression: Impression: Image guided exchange/conversion of right nephroureteral catheter to nephrostomy tube. Workstation:[email or web address]      Labs and pertinent reports reviewed.      This note has been generated by voice recognition software system.  Therefore, there may be spelling, grammar, and or syntax errors. Please contact if questions arise.

## 2024-09-20 NOTE — PROGRESS NOTES
Progress Note - Infectious Disease   Name: Patsy Dallas 83 y.o. female I MRN: [id number]  Unit/Bed#: E2 -01 I Date of Admission: 9/15/2024   Date of Service: 9/20/2024 I Hospital Day: 5     Assessment & Plan  Ischemia of toe  Patient presented with progressing pain in her R leg and thigh and new ischemic changes to her right great toe.  Thigh without obvious external trauma or skin changes. There is a nonspecific fluid collection on CT in the medial upper right thigh with broad differential. Unclear if this is related to her recent bleeding. IR has performed drainage of bloody fluid, cultures are pending with no growth preliminarily. Consider this may be clot related. 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 LEADs with diffuse arterial occlusive disease but great toe pressures are within healing range. Consider RLE pain may have claudication component. Consider this may be representative of embolic condition, with possible retroperitoneal fluid collection/iliac artery stent contributing. The patient remains clinically stable without fever or leukocytosis. She has been on chronic amoxicillin from [facility] ID for #3/4, but there is no indication for additional antibiotic treatment at this time.   -continue amoxicillin suppression per [facility] ID  -no indication for other antibiotics at this time  -monitor CBCD and BMP  -follow up IR aspirate culture from R thigh  -monitor vitals  -given possibility of claudication related pain, recommend patient keep leg out in front of her when in bed instead of elevated  Abnormal CT scan  Patient had CTA imaging done in the setting of #1.  She is noted to have some air-fluid collections in the retroperitoneal space adjacent to retroperitoneal clips.  She does not localize any symptoms to this area and PCN draining clear. Unclear if this may be postoperative given recent extensive  bleeding at end of July. IR has evaluated imaging and there is concern this finding may suggest a visceral fistula or infection in contact with her vascular stent, as she has air showing in the bladder as well. This may be causing an embolic phenomenon which could explain her RLE ecchymosis above. ID discussed further with IR and primary. She has been followed previously by [facility] urology and vascular surgery. She did meet with the [facility] Urology and vascular surgery teams yesterday. No plans for surgical intervention at this time.   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 She underwent stenting of the site at the end of July after acute significant bleed.  Stent appears to be open on current CTA image.  Nephrostomy draining clear. She has been following outpatient at [facility] and their ID team has her on chronic amoxicillin suppression.  -continue amoxicillin suppression per [facility] ID  -ongoing follow-up by vascular surgery  Nephrostomy tube bleed (HCC)  Patient with chronic hematuria from her nephrostomy and was found to have #3. Will monitor for now. Patient with polymicrobial growth in recent urine culture, however given presence of chronic PCN this is likely colonization. Continue suppressive antibiotic as above.  -resume follow up with [facility] ID after discharge  History of cervical cancer  Patient has history of metastatic cervical cancer and she was diagnosed with metastatic lesion to the lung in 2018.  She completed localized radiation but no further chemotherapy.  She has been receiving holistic therapy since.  She was under surveillance.  Would question if this may have led to a hypercoagulable state leading to #1.  She also has background of prior  non-Hodgkin's lymphoma.  -oncology evaluation noted  -anticoagulation management per primary service    Given patient's requires no new ID interventions, the ID team will sign off at this time. Please call with new questions or concerns.    Above plan was discussed in detail with patient and her daughter at the bedside.  Above plan was discussed in detail with LACHELLE who agrees with plan for ongoing amoxicillin suppression.    Antibiotics:  Chronic amoxicillin suppression    Subjective:  Patient reports she's had a hard time walking this morning. Describes intense pain along the plantar aspect of her foot. Is additionally having RLQ and groin pain which periodically sends shooting pain down her R leg. She has noticed increased blotchy redness and ecchymosis on the R foot, R medial leg, and R knee. Patient noted to have a harder time walking to the bathroom this morning due to her pain. She did her AM care with help of daughter and team and emptied PCN and colostomy. Patient has concerns regarding pain at home and fear of becoming addicted to pain medication. I did discuss with her that pain medication would be managed by her palliative care team and that her goal does not need to be pain free and zoned out, just managed enough that she feels she can participate in her ADLs and other activities that she enjoys. Encouraged her to discuss this further with her palliative team after discharge. Patient also with questions regarding getting herself to the bathroom which is on the second floor of her home, is anticipating using a stair chair and walker. Daughter asks questions regarding what signs on the leg should patient/family watch for and what would mean she would need to come back to the hospital. I encouraged her to discuss this further with the vascular team. Patient otherwise reports she's still tired. She offers no other symptoms.    Objective:  Vitals:  Temp:  [96.3 °F (35.7 °C)-98.7 °F (37.1 °C)] 98.7 °F (37.1  °C)  HR:  [77-94] 82  Resp:  [16-18] 16  BP: ()/(50-61) 127/61  SpO2:  [95 %-99 %] 96 %  Temp (24hrs), Av.5 °F (36.4 °C), Min:96.3 °F (35.7 °C), Max:98.7 °F (37.1 °C)  Current: Temperature: 98.7 °F (37.1 °C)    Physical Exam:   General Appearance:  Alert, interactive, nontoxic, in no acute distress. She appears comfortable sitting out of the bed in the recliner once RN repositioned her leges. She appears chronically debilitated, frail.   Lungs:   Respirations unlabored on room air.   Abdomen:   Ostomy intact. R PCN intact.   Extremities: R great toe with dense ecchymosis, darker on the plantar aspect, with other 4 toes beginning to appear somewhat dusky. R lateral foot with patchy ecchymosis. Scattered ecchymosis along the medial calf and now over infrapatellar notch region. Some faint blotchy erythema on R medial thigh.   Skin: No new rashes noted on exposed skin.     Labs, Imaging, & Other studies:   All pertinent labs and imaging studies were personally reviewed  Results from last 7 days   Lab Units 24  0955 24  0524 24  0428   WBC Thousand/uL 8.78 11.07* 9.31   HEMOGLOBIN g/dL 10.2* 9.9* 9.2*   PLATELETS Thousands/uL 439* 405* 335     Results from last 7 days   Lab Units 24  0524 24  0428 24  0448 24  0434   POTASSIUM mmol/L 3.8   < > 3.8 3.4*   CHLORIDE mmol/L 97   < > 101 102   CO2 mmol/L 29   < > 28 28   BUN mg/dL 17   < > 15 16   CREATININE mg/dL 0.76   < > 0.76 0.67   EGFR ml/min/1.73sq m 71   < > 71 80   CALCIUM mg/dL 9.7   < > 8.9 8.9   AST U/L  --   --  24 29   ALT U/L  --   --  22 27   ALK PHOS U/L  --   --  164* 209*    < > = values in this interval not displayed.     Results from last 7 days   Lab Units 24  1507 24  1737 24  1527 24  1519 24  1509   BLOOD CULTURE  Received in Microbiology Lab. Culture in Progress.  Received in Microbiology Lab. Culture in Progress.  --   --  No Growth at 72 hrs. No Growth at 72 hrs.    GRAM STAIN RESULT   --  No Polys or Bacteria seen  --   --   --    URINE CULTURE   --   --  >100,000 cfu/ml Enterobacter cloacae*  40,000-49,000 cfu/ml Pseudomonas aeruginosa*  >100,000 cfu/ml Stenotrophomonas maltophilia*  --   --    BODY FLUID CULTURE, STERILE   --  No growth  --   --   --

## 2024-09-20 NOTE — ASSESSMENT & PLAN NOTE
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  CTA chest abdomen pelvis and echocardiogram without source.   Previously on warfarin for thromboembolism.  Vascular and hematology recommended anticoagulation.  Has been started on heparin infusion and transitioned to Eliquis especially given finding of chronic DVT on ipsilateral leg.  Discussion with ID and IR.  Gas in the area of retroperitoneal  ill-defined fluid collection is noted around surgical clips and this could be source of embolism.    I personally discussed with the patient's vascular surgery service at [facility]: Dr. Kofi Kee.  Nonsurgical candidate for any sort of stent removal or intervention as this may result in morbidity/mortality.  Recommendation was to continue with [address] and suppressive antibiotics.  Plan of care was also discussed with urology vascular surgery and ID here as well.  Disposition: Palliative approach.  Due to recurrent bleeding from nephrostomy tube when started on Eliquis 10 mg loading dose, patient has opted not to undergo anticoagulation or antiplatelet until she sees her hematologist Dr. Mcdowell.

## 2024-09-20 NOTE — ASSESSMENT & PLAN NOTE
Patient with ongoing chills and rigors.  CT with right retroperitoneal fluid collection as well as medial thigh collection with enhancement  Right retroperitoneal fluid collection could be cause/source of embolism.  Blood cultures negative  IR aspirated medial thigh collection with low concerns for infection.  Pyuria discussed with ID.  No need for directed treatment at this time

## 2024-09-20 NOTE — ASSESSMENT & PLAN NOTE
Hx of uretero-iliac fistula S/p emergent R external iliac artery stent grafting 7/25/2024 by vascular surgery at [facility]  CTA showing: Right external iliac artery stent is noted and appears patent  Follows with vascular surgery at [facility]

## 2024-09-20 NOTE — ASSESSMENT & PLAN NOTE
Hx of R ureteral stricture s/p stent c/b pyelonephritis and hydronephrosis  Has persistent bleeding from right nephrostomy, underwent clot evacuation and conversion of right PCN to PCNU and TURBT. Follows with urology at [facility]   7/25/24 uretero-Iliac fistula s/p R EIA stent graft placement   On chronic suppression therapy with amoxicillin 500 mg BID. Follows with ID at [facility]  Due to recurrent bleeding this admission after starting loading dose of Eliquis 10 mg twice daily the patient decided to not continue anticoagulation or antiplatelet until she discussed with her primary hematologist.

## 2024-09-20 NOTE — ASSESSMENT & PLAN NOTE
Patient had CTA imaging done in the setting of #1.  She is noted to have some air-fluid collections in the retroperitoneal space adjacent to retroperitoneal clips.  She does not localize any symptoms to this area and PCN draining clear. Unclear if this may be postoperative given recent extensive bleeding at end of July. IR has evaluated imaging and there is concern this finding may suggest a visceral fistula or infection in contact with her vascular stent, as she has air showing in the bladder as well. This may be causing an embolic phenomenon which could explain her RLE ecchymosis above. ID discussed further with IR and primary. She has been followed previously by [facility] urology and vascular surgery. She did meet with the [facility] Urology and vascular surgery teams yesterday. No plans for surgical intervention at this time.   -monitor exam for now  -monitor CBCD and BMP  -monitor vitals  -continue follow up with urology  -continue follow up with vascular surgery

## 2024-09-20 NOTE — RESTORATIVE TECHNICIAN NOTE
Restorative Technician Note      Patient Name: Patsy Juarezbora     Restorative Tech Visit Date: 09/20/24  Note Type: Mobility  Patient Position Upon Consult: Bedside chair  Activity Performed: Ambulated  Assistive Device: Roller walker  Patient Position at End of Consult: All needs within reach; Supine; Bed/Chair alarm activated      ns

## 2024-09-20 NOTE — ASSESSMENT & PLAN NOTE
Hx of uretero-iliac fistula S/p emergent R external iliac artery stent grafting 7/25/2024 by vascular surgery at [facility]  CTA showing: Right external iliac artery stent is noted and appears patent  Follows with vascular surgery at [facility]

## 2024-09-20 NOTE — ASSESSMENT & PLAN NOTE
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   GI did not have any immediate recommendations for endoscopic evaluation    Results from last 7 days   Lab Units 09/20/24  0955 09/19/24  0524 09/18/24  0428 09/17/24  0448   HEMOGLOBIN g/dL 10.2* 9.9* 9.2* 8.6*

## 2024-09-20 NOTE — DISCHARGE SUMMARY
DIscharge Summary - Hospitalist   Name: Patsy Dallas 83 y.o. 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  CTA chest abdomen pelvis and echocardiogram without source.   Previously on warfarin for thromboembolism.  Vascular and hematology recommended anticoagulation.  Has been started on heparin infusion and transitioned to Eliquis especially given finding of chronic DVT on ipsilateral leg.  Discussion with ID and IR.  Gas in the area of retroperitoneal  ill-defined fluid collection is noted around surgical clips and this could be source of embolism.    I personally discussed with the patient's vascular surgery service at [facility]: Dr. Kofi Kee.  Nonsurgical candidate for any sort of stent removal or intervention as this may result in morbidity/mortality.  Recommendation was to continue with [place] and suppressive antibiotics.  Plan of care was also discussed with urology vascular surgery and ID here as well.  Disposition: Palliative approach.  Due to recurrent bleeding from nephrostomy tube when started on Eliquis 10 mg loading dose, patient has opted not to undergo anticoagulation or antiplatelet until she sees her hematologist Dr. Mcdowell.  Abnormal CT scan  Patient with ongoing chills and rigors.  CT with right retroperitoneal fluid collection as well as medial thigh collection with enhancement  Right retroperitoneal fluid collection could be cause/source of embolism.  Blood cultures negative  IR aspirated medial thigh collection with low concerns for  infection.  Pyuria discussed with ID.  No need for directed treatment at this time  Nephrostomy tube bleed (HCC)  Hx of R ureteral stricture s/p stent c/b pyelonephritis and hydronephrosis  Has persistent bleeding from right nephrostomy, underwent clot evacuation and conversion of right PCN to PCNU and TURBT. 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 Follows with ID at [facility]  Due to recurrent bleeding this admission after starting loading dose of Eliquis 10 mg twice daily the patient decided to not continue anticoagulation or antiplatelet until she discussed with her primary hematologist.  History of cervical cancer  History of recurrent metastatic squamous cervical cancer s/p hysterectomy; metastasis to lung with resection (2014) and SBRT (2018).  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 Ileostomy (2012)   Benign essential hypertension  Lisinopril 2.5 mg daily      Medical Problems       Resolved Problems  Date Reviewed: 9/15/2024   None        Discharging Physician / Practitioner: Fernando Pacheco DO  PCP: Harvey Monteiro Jr., MD  Admission Date:   Admission Orders (From admission, onward)       Ordered        09/15/24 1935  INPATIENT ADMISSION  Once                         Discharge Date: 09/21/24    Consultations During Hospital Stay:  IP CONSULT TO VASCULAR SURGERY  IP CONSULT TO NUTRITION SERVICES  IP CONSULT TO PODIATRY  IP CONSULT TO HEMATOLOGY  IP CONSULT TO INFECTIOUS DISEASES  IP CONSULT TO GASTROENTEROLOGY  INPATIENT CONSULT TO IR  IP CONSULT TO UROLOGY     Procedures Performed:   VAS VENOUS DUPLEX -LOWER LIMB UNILATERAL    Result Date: 9/18/2024  Narrative:  [facility] REPORT CLINICAL: Indications: Patient presents with right lower extremity pain. Operative History: 2024-07-25 Right Uretero-Iliac Fistula repair with EIA Stent Right iliac stent Risk Factors The patient has history of HTN.   CONCLUSION: Impression: RIGHT LOWER LIMB Evidence suggestive of chronic occlusive deep vein thrombosis noted throughout the femoral vein. Collateral vessels noted. Subacute vs. chronic thrombus noted in the greater saphenous vein at the inguinal level. No evidence of superficial thrombophlebitis noted. Doppler evaluation shows a normal response to augmentation maneuvers. Popliteal, posterior tibial and anterior tibial arterial Doppler waveform's are biphasic.  LEFT LOWER LIMB LIMITED Evaluation shows no evidence of thrombus in the common femoral vein. Doppler evaluation shows a normal response to augmentation maneuvers.  SIGNATURE: Electronically Signed by: MURPHY MARIANO on 2024-09-18 04:13:27 PM    Echo complete w/ contrast if indicated    Result Date: 9/17/2024  Narrative:   Left Ventricle: Left ventricular cavity size is normal. There is moderate asymmetric hypertrophy of the septal wall. The left ventricular ejection fraction is 68% by visual estimation. Systolic function is normal. Wall motion is normal. Diastolic function is mildly abnormal, consistent with grade I (abnormal) relaxation.   Right Ventricle: Right ventricular cavity size is normal. Systolic function is normal.   Left Atrium: The atrium is mildly dilated.   Aortic Valve: There is aortic  valve sclerosis.   Mitral Valve: There is mild annular calcification.   Tricuspid Valve: There is trace regurgitation. Pulmonary artery systolic pressures are estimated at 36 mmHg.   There is no study for comparison.       VAS ARTERIAL DUPLEX- LOWER LIMB BILATERAL    Result Date: 9/17/2024  Impression: RIGHT LOWER LIMB: The right external iliac stent appears patent. Diffuse lower extremity arterial occlusive disease throughout the femoropopliteal segments without focal stenosis. Evidence of tibioperoneal disease Ankle/Brachial index: 1.45 which is unreliable secondary to poorly compressible vessels. PVR/ PPG tracings are  dampened. Metatarsal pressure of 251 mmHg Great toe pressure of  0 mmHg, within the healing range Second toe pressure 62 mmHg is within the healing range.  LEFT LOWER LIMB: Diffuse lower extremity arterial occlusive disease throughout the femoropopliteal segments without focal stenosis. Evidence of tibioperoneal disease Ankle/Brachial index: 1.33  which is in the normal range. PVR/ PPG tracings are normal Metatarsal pressure of 120 mmHg Great toe pressure of 73 mmHg, within the healing range  No previous study available for comparison.  SIGNATURE: Electronically Signed by: MIHIR ALBERTO MD on 2024-09-17 09:31:31 PM      Images:   CTA chest pe study    Result Date: 9/17/2024  Impression: 1.  No identifiable acute abnormality to account for the patient's clinical presentation. No evidence of a pulmonary embolus. 2.  Mild to moderate aortic atherosclerotic disease, without evidence of aneurysm formation or ulcerating plaques. 3.  Mild diffuse nonspecific subcutaneous edema. Workstation performed: [id number]     CTA abdominal w run off w wo contrast    Result Date: 9/16/2024  Impression: 1. No evidence of macro embolic disease or other acute arterial process. In the right leg, no significant proximal occlusive disease. There is moderate infrapopliteal disease with single vessel peroneal runoff. 2. No  significant aortoiliac or left infrainguinal occlusive disease 3. No culprit lesion seen that would account for thromboembolic disease 4. 2.5 cm collection medial upper right thigh musculature with some peripheral enhancement. Differential includes chronic seroma, evolving hematoma, or abscess in the proper clinical setting. 5. Ill-defined fluid collection in the right retroperitoneum which may be intramuscular adjacent to several retroperitoneal clips. This could represent chronic postop collection. Again, in the proper clinical setting cannot exclude acute infectious process. If there is any clinical concerns for systemic infection, recommend repeat abdomen pelvis CT with IV and oral contrast. Workstation performed: [id number]     CT head without contrast    Result Date: 9/15/2024  Impression: No acute intracranial abnormality.  Chronic microangiopathic changes. Workstation performed: [id number]       Lab Results: I have reviewed the following results:  Results from last 7 days   Lab Units 09/21/24  0506 09/20/24  0955 09/19/24  0524 09/18/24  0428 09/17/24  0448 09/16/24  1510 09/16/24  0434 09/15/24  1755   WBC Thousand/uL 6.09 8.78 11.07* 9.31 6.51  --  7.39 8.56   HEMOGLOBIN g/dL 8.6* 10.2* 9.9* 9.2* 8.6*  --  8.7* 10.0*   HEMATOCRIT % 27.9* 32.0* 30.8* 29.2* 27.2*  --  27.5* 32.4*   MCV fL 89 89 90 90 89  --  88 89   PLATELETS Thousands/uL 356 439* 405* 335 323  --  313 296   INR  1.66*  --   --   --   --  1.07  --  1.20*     Results from last 7 days   Lab Units 09/21/24  0506 09/19/24  0524 09/18/24  0428 09/17/24  0448 09/16/24  0434 09/15/24  1755   SODIUM mmol/L 138 134* 134* 136 136 134*   POTASSIUM mmol/L 3.8 3.8 3.7 3.8 3.4* 3.9   CHLORIDE mmol/L 101 97 99 101 102 98   CO2 mmol/L 31 29 27 28 28 29   BUN mg/dL 18 17 16 15 16 21   CREATININE mg/dL 0.81 0.76 0.78 0.76 0.67 0.81   CALCIUM mg/dL 9.3 9.7 9.0 8.9 8.9 9.3   ALBUMIN g/dL 2.7*  --   --  2.8* 2.9*  --    TOTAL BILIRUBIN mg/dL 0.18*  --   --   0.26 0.27  --    ALK PHOS U/L 203*  --   --  164* 209*  --    ALT U/L 40  --   --  22 27  --    AST U/L 38  --   --  24 29  --    EGFR ml/min/1.73sq m 66 71 69 71 80 66   GLUCOSE RANDOM mg/dL 92 124 136 96 118 105                                  Results from last 7 days   Lab Units 09/18/24  0428   PROCALCITONIN ng/ml 0.92*           Results from last 7 days   Lab Units 09/18/24  1527   COLOR UA  Yellow   CLARITY UA  Clear   SPEC GRAV UA  1.017   PH UA  6.0   LEUKOCYTES UA  Large*   NITRITE UA  Positive*   GLUCOSE UA mg/dl Negative   KETONES UA mg/dl Negative   BLOOD UA  Trace*      Results from last 7 days   Lab Units 09/18/24  1527   RBC UA /hpf 1-2   WBC UA /hpf 10-20*   EPITHELIAL CELLS WET PREP /hpf None Seen   BACTERIA UA /hpf Occasional      Results from last 7 days   Lab Units 09/19/24  1507 09/18/24  1737 09/18/24  1527 09/16/24  1519 09/16/24  1509   BLOOD CULTURE  No Growth at 24 hrs.  No Growth at 24 hrs.  --   --  No Growth After 4 Days. No Growth After 4 Days.   GRAM STAIN RESULT   --  No Polys or Bacteria seen  --   --   --    URINE CULTURE   --   --  >100,000 cfu/ml Enterobacter cloacae*  40,000-49,000 cfu/ml Pseudomonas aeruginosa*  >100,000 cfu/ml Stenotrophomonas maltophilia*  --   --    BODY FLUID CULTURE, STERILE   --  No growth  --   --   --      Results from last 7 days   Lab Units 09/16/24  1400   SARS-COV-2  Not Detected   RESPIRATORY SYNCYTIAL VIRUS  Not Detected       Incidental Findings:   Multiple intra-abdominal abnormalities from previous procedures.  Multidisciplinary discussion between vascular surgery at [facility] and consultants here.  Follow-up as an outpatient and continue [place] home care    Test Results Pending at Discharge (will require follow up):   Pending Labs       Order Current Status    Blood culture Preliminary result    Blood culture Preliminary result    Blood culture Preliminary result    Blood culture Preliminary result    Urine culture Preliminary result          "  Reason for Admission:   Toe Pain (Pt states that she has been having pain to right big toe, states that it is discolored and that the tip is black. States that she went to urgent care today and was advised to come to ED for further eval. )    Hospital Course:   Patsy Dallas is a 83 y.o. female patient who originally presented to the hospital on 9/15/2024 due to blue toe.  The patient had a 5-day hospitalization with multiple discussions between consultants.  Please refer to medical record for full details.  Concerns for vascular thrombus and the patient has been anticoagulated.  There remains concerns for vascular stent infection.  [facility] as consulting staff here.  Consensus was exploration would be high risk and may result in morbidity/mortality.  The patient has been on suppressive antibiotics since her stent was placed for utero iliac fistula/massive hemorrhage July 2024.  Unfortunately after receiving several doses of 10 mg Eliquis loading dose the patient had approximately 100 mL of blood from right nephrostomy tube and the patient has opted to be discharged with antiplatelet or anticoagulation until she discussed with her primary hematologist Dr. Mcdowell understanding that she is at risk for further thrombus or limb loss without directed treatment.    Please see above list of diagnoses and related plan for additional information.     Condition at Discharge: stable     Discharge Day Visit / Exam:   Subjective: Patient seen and examined.  Having right leg/toe pains    Vitals: Blood Pressure: 127/69 (09/21/24 0854)  Pulse: 75 (09/21/24 0720)  Temperature: 97.6 °F (36.4 °C) (09/21/24 0720)  Temp Source: Temporal (09/21/24 0720)  Respirations: 16 (09/21/24 0720)  Height: 5' 1\" (154.9 cm) (09/17/24 0935)  Weight - Scale: 57.6 kg (127 lb) (09/17/24 0935)  SpO2: 98 % (09/21/24 0720)    Exam:   Physical Exam  Vitals reviewed.   Constitutional:       General: " She is not in acute distress.     Appearance: Normal appearance.   HENT:      Head: Atraumatic.   Eyes:      General: No scleral icterus.  Cardiovascular:      Rate and Rhythm: Regular rhythm.      Heart sounds: Normal heart sounds.   Pulmonary:      Breath sounds: Decreased breath sounds present. No wheezing.   Abdominal:      General: Bowel sounds are normal.      Palpations: Abdomen is soft.      Tenderness: There is no guarding.      Comments: Ostomy present   Musculoskeletal:      Right lower leg: Edema present.      Left lower leg: Edema present.   Skin:     Findings: Bruising present.   Neurological:      Mental Status: She is alert and oriented to person, place, and time. Mental status is at baseline.   Psychiatric:         Mood and Affect: Mood normal.       Discussion with Family: Daughter and son on conference call on telephone    Discharge instructions/Information to patient and family:   See after visit summary for information provided to patient and family.      Provisions for Follow-Up Care:  See after visit summary for information related to follow-up care and any pertinent home health orders.      Mobility at time of Discharge:  Basic Mobility Inpatient Raw Score: 22  JH-HLM Goal: 7: Walk 25 feet or more  JH-HLM Achieved: 7: Walk 25 feet or more  JH-HLM Goal achieved. Continue to encourage appropriate mobility.    Disposition:   Home with VNA Services (Reminder: Complete face to face encounter)    Planned Readmission: No     Discharge Medications:  See after visit summary for reconciled discharge medications provided to patient and family.      Administrative Statements   I spent 35 minutes discharging the patient. This time was spent on the day of discharge. I had direct contact with the patient on the day of discharge. Greater than 50% of the total time was spent examining patient, answering all patient questions, arranging and discussing plan of care with patient as well as directly providing  post-discharge instructions.  Additional time then spent on discharge activities.    **Please Note: This note may have been constructed using a voice recognition system**

## 2024-09-20 NOTE — PLAN OF CARE
Problem: PAIN - ADULT  Goal: Verbalizes/displays adequate comfort level or baseline comfort level  Description: Interventions:  - Encourage patient to monitor pain and request assistance  - Assess pain using appropriate pain scale  - Administer analgesics based on type and severity of pain and evaluate response  - Implement non-pharmacological measures as appropriate and evaluate response  - Consider cultural and social influences on pain and pain management  - Notify physician/advanced practitioner if interventions unsuccessful or patient reports new pain  Outcome: Progressing     Problem: INFECTION - ADULT  Goal: Absence or prevention of progression during hospitalization  Description: INTERVENTIONS:  - Assess and monitor for signs and symptoms of infection  - Monitor lab/diagnostic results  - Monitor all insertion sites, i.e. indwelling lines, tubes, and drains  - Monitor endotracheal if appropriate and nasal secretions for changes in amount and color  - [place] appropriate cooling/warming therapies per order  - Administer medications as ordered  - Instruct and encourage patient and family to use good hand hygiene technique  - Identify and instruct in appropriate isolation precautions for identified infection/condition  Outcome: Progressing  Goal: Absence of fever/infection during neutropenic period  Description: INTERVENTIONS:  - Monitor WBC    Outcome: Progressing     Problem: SAFETY ADULT  Goal: Patient will remain free of falls  Description: INTERVENTIONS:  - Educate patient/family on patient safety including physical limitations  - Instruct patient to call for assistance with activity   - Consult OT/PT to assist with strengthening/mobility   - Keep Call bell within reach  - Keep bed low and locked with side rails adjusted as appropriate  - Keep care items and personal belongings within reach  - Initiate and maintain comfort rounds  - Make Fall Risk Sign visible to staff  - Offer Toileting every 2-4  Hours, in advance of need  - Initiate/Maintain bed alarm  - Obtain necessary fall risk management equipment: nonskid socks   - Apply yellow socks and bracelet for high fall risk patients  - Consider moving patient to room near nurses station  Outcome: Progressing  Goal: Maintain or return to baseline ADL function  Description: INTERVENTIONS:  -  Assess patient's ability to carry out ADLs; assess patient's baseline for ADL function and identify physical deficits which impact ability to perform ADLs (bathing, care of mouth/teeth, toileting, grooming, dressing, etc.)  - Assess/evaluate cause of self-care deficits   - Assess range of motion  - Assess patient's mobility; develop plan if impaired  - Assess patient's need for assistive devices and provide as appropriate  - Encourage maximum independence but intervene and supervise when necessary  - Involve family in performance of ADLs  - Assess for home care needs following discharge   - Consider OT consult to assist with ADL evaluation and planning for discharge  - Provide patient education as appropriate  Outcome: Progressing  Goal: Maintains/Returns to pre admission functional level  Description: INTERVENTIONS:  - Perform AM-PAC 6 Click Basic Mobility/ Daily Activity assessment daily.  - Set and communicate daily mobility goal to care team and patient/family/caregiver.   - Collaborate with rehabilitation services on mobility goals if consulted  - Perform Range of Motion 3 times a day.  - Reposition patient every 3 hours.  - Dangle patient 3 times a day  - Stand patient 3 times a day  - Ambulate patient 3 times a day  - Out of bed to chair 3 times a day   - Out of bed for meals 3 times a day  - Out of bed for toileting  - Record patient progress and toleration of activity level   Outcome: Progressing     Problem: DISCHARGE PLANNING  Goal: Discharge to home or other facility with appropriate resources  Description: INTERVENTIONS:  - Identify barriers to discharge  w/patient and caregiver  - Arrange for needed discharge resources and transportation as appropriate  - Identify discharge learning needs (meds, wound care, etc.)  - Arrange for interpretive services to assist at discharge as needed  - Refer to Case Management Department for coordinating discharge planning if the patient needs post-hospital services based on physician/advanced practitioner order or complex needs related to functional status, cognitive ability, or social support system  Outcome: Progressing     Problem: Knowledge Deficit  Goal: Patient/family/caregiver demonstrates understanding of disease process, treatment plan, medications, and discharge instructions  Description: Complete learning assessment and assess knowledge base.  Interventions:  - Provide teaching at level of understanding  - Provide teaching via preferred learning methods  Outcome: Progressing     Problem: Prexisting or High Potential for Compromised Skin Integrity  Goal: Skin integrity is maintained or improved  Description: INTERVENTIONS:  - Identify patients at risk for skin breakdown  - Assess and monitor skin integrity  - Assess and monitor nutrition and hydration status  - Monitor labs   - Assess for incontinence   - Turn and reposition patient  - Assist with mobility/ambulation  - Relieve pressure over bony prominences  - Avoid friction and shearing  - Provide appropriate hygiene as needed including keeping skin clean and dry  - Evaluate need for skin moisturizer/barrier cream  - Collaborate with interdisciplinary team   - Patient/family teaching  - Consider wound care consult   Outcome: Progressing     Problem: Nutrition/Hydration-ADULT  Goal: Nutrient/Hydration intake appropriate for improving, restoring or maintaining nutritional needs  Description: Monitor and assess patient's nutrition/hydration status for malnutrition. Collaborate with interdisciplinary team and initiate plan and interventions as ordered.  Monitor patient's  weight and dietary intake as ordered or per policy. Utilize nutrition screening tool and intervene as necessary. Determine patient's food preferences and provide high-protein, high-caloric foods as appropriate.     INTERVENTIONS:  - Monitor oral intake, urinary output, labs, and treatment plans  - Assess nutrition and hydration status and recommend course of action  - Evaluate amount of meals eaten  - Assist patient with eating if necessary   - Allow adequate time for meals  - Recommend/ encourage appropriate diets, oral nutritional supplements, and vitamin/mineral supplements  - Order, calculate, and assess calorie counts as needed  - Recommend, monitor, and adjust tube feedings and TPN/PPN based on assessed needs  - Assess need for intravenous fluids  - Provide specific nutrition/hydration education as appropriate  - Include patient/family/caregiver in decisions related to nutrition  Outcome: Progressing

## 2024-09-20 NOTE — ASSESSMENT & PLAN NOTE
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 She underwent stenting of the site at the end of July after acute significant bleed.  Stent appears to be open on current CTA image.  Nephrostomy draining clear. She has been following outpatient at [facility] and their ID team has her on chronic amoxicillin suppression.  -continue amoxicillin suppression per [facility] ID  -ongoing follow-up by vascular surgery

## 2024-09-20 NOTE — ASSESSMENT & PLAN NOTE
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  CTA chest abdomen pelvis and echocardiogram without source.   Previously on warfarin for thromboembolism.  Vascular and hematology recommended anticoagulation.  Has been started on heparin infusion and transitioned to Eliquis especially given finding of chronic DVT on ipsilateral leg.  Patient refusing further anticoagulation due to hematuria understanding risk of limb ischemia  Discussion with ID and IR.  Gas in the area of retroperitoneal  ill-defined fluid collection is noted around surgical clips and this could be source of embolism.    I personally discussed with the patient's vascular surgery service at [facility]: Dr. Kofi Kee.  Nonsurgical candidate for any sort of stent removal or intervention as this may result in morbidity/mortality.  Recommendation was to continue with Berthold and suppressive antibiotics.  Plan of care was also discussed with urology vascular surgery and ID here as well.  Disposition: Palliative approach.  Outpatient follow-up.

## 2024-09-20 NOTE — CASE MANAGEMENT
Case Management Discharge Planning Note    Patient name Patsy Dallas  Location [facility] /[facility]2 -* MRN [id number]  : 1940 Date 2024       Current Admission Date: 9/15/2024  Current Admission Diagnosis:Ischemia of toe   Patient Active Problem List    Diagnosis Date Noted Date Diagnosed    Abnormal CT scan 2024     Possible Embolus (HCC) 09/15/2024     Ischemia of toe 09/15/2024     Nephrostomy tube bleed (HCC) 09/15/2024     Chronic anemia 09/15/2024     Iliac artery bleed 09/15/2024     H/O colectomy 09/15/2024     Gross hematuria 2023     Encounter for screening mammogram for breast cancer 2023     Inflammatory bowel disease 2022     Radiation enteritis 2022     Metastasis from cervical cancer (HCC) 12/10/2021     Encounter for follow-up surveillance of cervical cancer 2019     History of cervical cancer 2018     Diffuse large B cell lymphoma (HCC) 2018     Nontoxic single thyroid nodule 2018     Nodule of left lung 2018     Renal insufficiency 2017     Compression fracture of third lumbar vertebra with routine healing 2017     History of DVT (deep vein thrombosis)      Edema of left lower extremity 10/18/2016     Abdominal pain of multiple sites 2015     Lymphedema 2015     Other diseases of lung, not elsewhere classified 10/23/2013     Hypothyroidism 2013     Chemotherapy follow-up examination 2013     Depression 2013     Benign essential hypertension 2010       LOS (days): 5  Geometric Mean LOS (GMLOS) (days): 1.8  Days to GMLOS:-2.8     OBJECTIVE:  Risk of Unplanned Readmission Score: 17.03         Current admission status: Inpatient   Preferred Pharmacy:   [id number] [place] - [address]  [address]  [place] [address]  Phone: [phone] Fax: [phone]    Primary Care Provider: Harvey Monteiro Jr., MD    Primary Insurance: MEDICARE  Secondary  Insurance:     DISCHARGE DETAILS:    Discharge planning discussed with:: Patient and daughter  Freedom of Choice: Yes  Comments - Freedom of Choice: Agreeable to THERESE with  Home Health Care  CM contacted family/caregiver?: Yes  Were Treatment Team discharge recommendations reviewed with patient/caregiver?: Yes  Did patient/caregiver verbalize understanding of patient care needs?: Yes       Contacts  Patient Contacts: Ann He  Relationship to Patient:: Family  Contact Method: In Person  Reason/Outcome: Discharge Planning, Emergency Contact    Requested Home Health Care         Is the patient interested in HHC at discharge?: Yes  Home Health Discipline requested:: Nursing, Occupational Therapy, Physical Therapy, Home Health Aide  Home Health Agency Name:: [facility]  HHA External Referral Reason (only applicable if external HHA name selected): Patient has established relationship with provider  Home Health Follow-Up Provider:: PCP  Home Health Services Needed:: Evaluate Functional Status and Safety, Gait/ADL Training, Strengthening/Theraputic Exercises to Improve Function, Wound/Ostomy Care  Homebound Criteria Met:: Requires the Assistance of Another Person for Safe Ambulation or to Leave the Home, Uses an Assist Device (i.e. cane, walker, etc)  Supporting Clincal Findings:: Fatigues Easliy in Short Distances, Limited Endurance    DME Referral Provided  Referral made for DME?: No    Other Referral/Resources/Interventions Provided:  Interventions: HHC         Treatment Team Recommendation: Home with Home Health Care  Discharge Destination Plan:: Home with Home Health Care                                IMM Given (Date):: 09/20/24  IMM Given to:: Patient  IMM was reviewed with the pt. Pt reports understanding of the ability to appeal discharge if feeling as though not medically stable for discharge. IMM was signed and placed in the scan bin.

## 2024-09-21 VITALS
TEMPERATURE: 97.6 F | WEIGHT: 127 LBS | HEART RATE: 75 BPM | SYSTOLIC BLOOD PRESSURE: 127 MMHG | BODY MASS INDEX: 23.98 KG/M2 | HEIGHT: 61 IN | DIASTOLIC BLOOD PRESSURE: 69 MMHG | OXYGEN SATURATION: 98 % | RESPIRATION RATE: 16 BRPM

## 2024-09-21 LAB
ALBUMIN SERPL BCG-MCNC: 2.7 G/DL (ref 3.5–5)
ALP SERPL-CCNC: 203 U/L (ref 34–104)
ALT SERPL W P-5'-P-CCNC: 40 U/L (ref 7–52)
ANION GAP SERPL CALCULATED.3IONS-SCNC: 6 MMOL/L (ref 4–13)
AST SERPL W P-5'-P-CCNC: 38 U/L (ref 13–39)
BACTERIA BLD CULT: NORMAL
BACTERIA BLD CULT: NORMAL
BACTERIA SPEC ANAEROBE CULT: NO GROWTH
BACTERIA SPEC BFLD CULT: NO GROWTH
BACTERIA UR CULT: ABNORMAL
BILIRUB SERPL-MCNC: 0.18 MG/DL (ref 0.2–1)
BUN SERPL-MCNC: 18 MG/DL (ref 5–25)
CALCIUM ALBUM COR SERPL-MCNC: 10.3 MG/DL (ref 8.3–10.1)
CALCIUM SERPL-MCNC: 9.3 MG/DL (ref 8.4–10.2)
CHLORIDE SERPL-SCNC: 101 MMOL/L (ref 96–108)
CO2 SERPL-SCNC: 31 MMOL/L (ref 21–32)
CREAT SERPL-MCNC: 0.81 MG/DL (ref 0.6–1.3)
ERYTHROCYTE [DISTWIDTH] IN BLOOD BY AUTOMATED COUNT: 17.9 % (ref 11.6–15.1)
GFR SERPL CREATININE-BSD FRML MDRD: 66 ML/MIN/1.73SQ M
GLUCOSE SERPL-MCNC: 92 MG/DL (ref 65–140)
GRAM STN SPEC: NORMAL
HCT VFR BLD AUTO: 27.9 % (ref 34.8–46.1)
HGB BLD-MCNC: 8.6 G/DL (ref 11.5–15.4)
INR PPP: 1.66 (ref 0.85–1.19)
MCH RBC QN AUTO: 27.6 PG (ref 26.8–34.3)
MCHC RBC AUTO-ENTMCNC: 30.8 G/DL (ref 31.4–37.4)
MCV RBC AUTO: 89 FL (ref 82–98)
PLATELET # BLD AUTO: 356 THOUSANDS/UL (ref 149–390)
PMV BLD AUTO: 8.4 FL (ref 8.9–12.7)
POTASSIUM SERPL-SCNC: 3.8 MMOL/L (ref 3.5–5.3)
PROT SERPL-MCNC: 5.8 G/DL (ref 6.4–8.4)
PROTHROMBIN TIME: 19.7 SECONDS (ref 12.3–15)
RBC # BLD AUTO: 3.12 MILLION/UL (ref 3.81–5.12)
SODIUM SERPL-SCNC: 138 MMOL/L (ref 135–147)
WBC # BLD AUTO: 6.09 THOUSAND/UL (ref 4.31–10.16)

## 2024-09-21 PROCEDURE — 80053 COMPREHEN METABOLIC PANEL: CPT | Performed by: INTERNAL MEDICINE

## 2024-09-21 PROCEDURE — 85027 COMPLETE CBC AUTOMATED: CPT | Performed by: INTERNAL MEDICINE

## 2024-09-21 PROCEDURE — 85610 PROTHROMBIN TIME: CPT | Performed by: INTERNAL MEDICINE

## 2024-09-21 PROCEDURE — 99239 HOSP IP/OBS DSCHRG MGMT >30: CPT | Performed by: INTERNAL MEDICINE

## 2024-09-21 RX ADMIN — Medication 250 MG: at 08:53

## 2024-09-21 RX ADMIN — B-COMPLEX W/ C & FOLIC ACID TAB 1 TABLET: TAB at 08:53

## 2024-09-21 RX ADMIN — DOCUSATE SODIUM 100 MG: 100 CAPSULE, LIQUID FILLED ORAL at 08:54

## 2024-09-21 RX ADMIN — Medication 1000 UNITS: at 08:53

## 2024-09-21 RX ADMIN — LISINOPRIL 2.5 MG: 5 TABLET ORAL at 08:54

## 2024-09-21 RX ADMIN — LEVOTHYROXINE SODIUM 50 MCG: 50 TABLET ORAL at 05:11

## 2024-09-21 RX ADMIN — ACETAMINOPHEN 975 MG: 325 TABLET ORAL at 08:55

## 2024-09-21 RX ADMIN — OXYCODONE HYDROCHLORIDE AND ACETAMINOPHEN 500 MG: 500 TABLET ORAL at 08:54

## 2024-09-21 RX ADMIN — AMOXICILLIN 500 MG: 500 CAPSULE ORAL at 08:53

## 2024-09-21 NOTE — PLAN OF CARE
Problem: PAIN - ADULT  Goal: Verbalizes/displays adequate comfort level or baseline comfort level  Description: Interventions:  - Encourage patient to monitor pain and request assistance  - Assess pain using appropriate pain scale  - Administer analgesics based on type and severity of pain and evaluate response  - Implement non-pharmacological measures as appropriate and evaluate response  - Consider cultural and social influences on pain and pain management  - Notify physician/advanced practitioner if interventions unsuccessful or patient reports new pain  Outcome: Progressing     Problem: INFECTION - ADULT  Goal: Absence or prevention of progression during hospitalization  Description: INTERVENTIONS:  - Assess and monitor for signs and symptoms of infection  - Monitor lab/diagnostic results  - Monitor all insertion sites, i.e. indwelling lines, tubes, and drains  - Monitor endotracheal if appropriate and nasal secretions for changes in amount and color  - [place] appropriate cooling/warming therapies per order  - Administer medications as ordered  - Instruct and encourage patient and family to use good hand hygiene technique  - Identify and instruct in appropriate isolation precautions for identified infection/condition  Outcome: Progressing  Goal: Absence of fever/infection during neutropenic period  Description: INTERVENTIONS:  - Monitor WBC    Outcome: Progressing     Problem: SAFETY ADULT  Goal: Patient will remain free of falls  Description: INTERVENTIONS:  - Educate patient/family on patient safety including physical limitations  - Instruct patient to call for assistance with activity   - Consult OT/PT to assist with strengthening/mobility   - Keep Call bell within reach  - Keep bed low and locked with side rails adjusted as appropriate  - Keep care items and personal belongings within reach  - Initiate and maintain comfort rounds  - Make Fall Risk Sign visible to staff  - Offer Toileting every 2 Hours,  in advance of need  - Initiate/Maintain bed alarm  - Obtain necessary fall risk management equipment:  socks, yellow bracelet,   - Apply yellow socks and bracelet for high fall risk patients  - Consider moving patient to room near nurses station  Outcome: Progressing  Goal: Maintain or return to baseline ADL function  Description: INTERVENTIONS:  -  Assess patient's ability to carry out ADLs; assess patient's baseline for ADL function and identify physical deficits which impact ability to perform ADLs (bathing, care of mouth/teeth, toileting, grooming, dressing, etc.)  - Assess/evaluate cause of self-care deficits   - Assess range of motion  - Assess patient's mobility; develop plan if impaired  - Assess patient's need for assistive devices and provide as appropriate  - Encourage maximum independence but intervene and supervise when necessary  - Involve family in performance of ADLs  - Assess for home care needs following discharge   - Consider OT consult to assist with ADL evaluation and planning for discharge  - Provide patient education as appropriate  Outcome: Progressing  Goal: Maintains/Returns to pre admission functional level  Description: INTERVENTIONS:  - Perform AM-PAC 6 Click Basic Mobility/ Daily Activity assessment daily.  - Set and communicate daily mobility goal to care team and patient/family/caregiver.   - Collaborate with rehabilitation services on mobility goals if consulted  - Perform Range of Motion 3 times a day.  - Reposition patient every 2 hours.  - Dangle patient 3 times a day  - Stand patient 3 times a day  - Ambulate patient 3 times a day  - Out of bed to chair 3 times a day   - Out of bed for meals 3 times a day  - Out of bed for toileting  - Record patient progress and toleration of activity level   Outcome: Progressing     Problem: DISCHARGE PLANNING  Goal: Discharge to home or other facility with appropriate resources  Description: INTERVENTIONS:  - Identify barriers to discharge  w/patient and caregiver  - Arrange for needed discharge resources and transportation as appropriate  - Identify discharge learning needs (meds, wound care, etc.)  - Arrange for interpretive services to assist at discharge as needed  - Refer to Case Management Department for coordinating discharge planning if the patient needs post-hospital services based on physician/advanced practitioner order or complex needs related to functional status, cognitive ability, or social support system  Outcome: Progressing     Problem: Knowledge Deficit  Goal: Patient/family/caregiver demonstrates understanding of disease process, treatment plan, medications, and discharge instructions  Description: Complete learning assessment and assess knowledge base.  Interventions:  - Provide teaching at level of understanding  - Provide teaching via preferred learning methods  Outcome: Progressing     Problem: Prexisting or High Potential for Compromised Skin Integrity  Goal: Skin integrity is maintained or improved  Description: INTERVENTIONS:  - Identify patients at risk for skin breakdown  - Assess and monitor skin integrity  - Assess and monitor nutrition and hydration status  - Monitor labs   - Assess for incontinence   - Turn and reposition patient  - Assist with mobility/ambulation  - Relieve pressure over bony prominences  - Avoid friction and shearing  - Provide appropriate hygiene as needed including keeping skin clean and dry  - Evaluate need for skin moisturizer/barrier cream  - Collaborate with interdisciplinary team   - Patient/family teaching  - Consider wound care consult   Outcome: Progressing     Problem: Nutrition/Hydration-ADULT  Goal: Nutrient/Hydration intake appropriate for improving, restoring or maintaining nutritional needs  Description: Monitor and assess patient's nutrition/hydration status for malnutrition. Collaborate with interdisciplinary team and initiate plan and interventions as ordered.  Monitor patient's  weight and dietary intake as ordered or per policy. Utilize nutrition screening tool and intervene as necessary. Determine patient's food preferences and provide high-protein, high-caloric foods as appropriate.     INTERVENTIONS:  - Monitor oral intake, urinary output, labs, and treatment plans  - Assess nutrition and hydration status and recommend course of action  - Evaluate amount of meals eaten  - Assist patient with eating if necessary   - Allow adequate time for meals  - Recommend/ encourage appropriate diets, oral nutritional supplements, and vitamin/mineral supplements  - Order, calculate, and assess calorie counts as needed  - Recommend, monitor, and adjust tube feedings and TPN/PPN based on assessed needs  - Assess need for intravenous fluids  - Provide specific nutrition/hydration education as appropriate  - Include patient/family/caregiver in decisions related to nutrition  Outcome: Progressing

## 2024-09-21 NOTE — PLAN OF CARE
Problem: PAIN - ADULT  Goal: Verbalizes/displays adequate comfort level or baseline comfort level  Description: Interventions:  - Encourage patient to monitor pain and request assistance  - Assess pain using appropriate pain scale  - Administer analgesics based on type and severity of pain and evaluate response  - Implement non-pharmacological measures as appropriate and evaluate response  - Consider cultural and social influences on pain and pain management  - Notify physician/advanced practitioner if interventions unsuccessful or patient reports new pain  Outcome: Progressing     Problem: INFECTION - ADULT  Goal: Absence or prevention of progression during hospitalization  Description: INTERVENTIONS:  - Assess and monitor for signs and symptoms of infection  - Monitor lab/diagnostic results  - Monitor all insertion sites, i.e. indwelling lines, tubes, and drains  - Monitor endotracheal if appropriate and nasal secretions for changes in amount and color  - [place] appropriate cooling/warming therapies per order  - Administer medications as ordered  - Instruct and encourage patient and family to use good hand hygiene technique  - Identify and instruct in appropriate isolation precautions for identified infection/condition  Outcome: Progressing  Goal: Absence of fever/infection during neutropenic period  Description: INTERVENTIONS:  - Monitor WBC    Outcome: Progressing     Problem: SAFETY ADULT  Goal: Patient will remain free of falls  Description: INTERVENTIONS:  - Educate patient/family on patient safety including physical limitations  - Instruct patient to call for assistance with activity   - Consult OT/PT to assist with strengthening/mobility   - Keep Call bell within reach  - Keep bed low and locked with side rails adjusted as appropriate  - Keep care items and personal belongings within reach  - Initiate and maintain comfort rounds  - Make Fall Risk Sign visible to staff  - Initiate/Maintain bed alarm  -  Obtain necessary fall risk management equipment: bed alarm, call bell,  socks  - Apply yellow socks and bracelet for high fall risk patients  - Consider moving patient to room near nurses station  Outcome: Progressing  Goal: Maintain or return to baseline ADL function  Description: INTERVENTIONS:  -  Assess patient's ability to carry out ADLs; assess patient's baseline for ADL function and identify physical deficits which impact ability to perform ADLs (bathing, care of mouth/teeth, toileting, grooming, dressing, etc.)  - Assess/evaluate cause of self-care deficits   - Assess range of motion  - Assess patient's mobility; develop plan if impaired  - Assess patient's need for assistive devices and provide as appropriate  - Encourage maximum independence but intervene and supervise when necessary  - Involve family in performance of ADLs  - Assess for home care needs following discharge   - Consider OT consult to assist with ADL evaluation and planning for discharge  - Provide patient education as appropriate  Outcome: Progressing  Goal: Maintains/Returns to pre admission functional level  Description: INTERVENTIONS:  - Perform AM-PAC 6 Click Basic Mobility/ Daily Activity assessment daily.  - Set and communicate daily mobility goal to care team and patient/family/caregiver.   - Collaborate with rehabilitation services on mobility goals if consulted  - Perform Range of Motion 3 times a day.  - Reposition patient every 2 hours.  - Dangle patient 3 times a day  - Stand patient 3 times a day  - Ambulate patient 3 times a day  - Out of bed to chair 3 times a day   - Out of bed for meals 3 times a day  - Out of bed for toileting  - Record patient progress and toleration of activity level   Outcome: Progressing     Problem: DISCHARGE PLANNING  Goal: Discharge to home or other facility with appropriate resources  Description: INTERVENTIONS:  - Identify barriers to discharge w/patient and caregiver  - Arrange for needed  discharge resources and transportation as appropriate  - Identify discharge learning needs (meds, wound care, etc.)  - Arrange for interpretive services to assist at discharge as needed  - Refer to Case Management Department for coordinating discharge planning if the patient needs post-hospital services based on physician/advanced practitioner order or complex needs related to functional status, cognitive ability, or social support system  Outcome: Progressing     Problem: Knowledge Deficit  Goal: Patient/family/caregiver demonstrates understanding of disease process, treatment plan, medications, and discharge instructions  Description: Complete learning assessment and assess knowledge base.  Interventions:  - Provide teaching at level of understanding  - Provide teaching via preferred learning methods  Outcome: Progressing     Problem: Prexisting or High Potential for Compromised Skin Integrity  Goal: Skin integrity is maintained or improved  Description: INTERVENTIONS:  - Identify patients at risk for skin breakdown  - Assess and monitor skin integrity  - Assess and monitor nutrition and hydration status  - Monitor labs   - Assess for incontinence   - Turn and reposition patient  - Assist with mobility/ambulation  - Relieve pressure over bony prominences  - Avoid friction and shearing  - Provide appropriate hygiene as needed including keeping skin clean and dry  - Evaluate need for skin moisturizer/barrier cream  - Collaborate with interdisciplinary team   - Patient/family teaching  - Consider wound care consult   Outcome: Progressing     Problem: Nutrition/Hydration-ADULT  Goal: Nutrient/Hydration intake appropriate for improving, restoring or maintaining nutritional needs  Description: Monitor and assess patient's nutrition/hydration status for malnutrition. Collaborate with interdisciplinary team and initiate plan and interventions as ordered.  Monitor patient's weight and dietary intake as ordered or per  policy. Utilize nutrition screening tool and intervene as necessary. Determine patient's food preferences and provide high-protein, high-caloric foods as appropriate.     INTERVENTIONS:  - Monitor oral intake, urinary output, labs, and treatment plans  - Assess nutrition and hydration status and recommend course of action  - Evaluate amount of meals eaten  - Assist patient with eating if necessary   - Allow adequate time for meals  - Recommend/ encourage appropriate diets, oral nutritional supplements, and vitamin/mineral supplements  - Order, calculate, and assess calorie counts as needed  - Recommend, monitor, and adjust tube feedings and TPN/PPN based on assessed needs  - Assess need for intravenous fluids  - Provide specific nutrition/hydration education as appropriate  - Include patient/family/caregiver in decisions related to nutrition  Outcome: Progressing

## 2024-09-24 ENCOUNTER — TELEPHONE (OUTPATIENT)
Age: 84
End: 2024-09-24

## 2024-09-24 LAB
BACTERIA BLD CULT: NORMAL
BACTERIA BLD CULT: NORMAL

## 2024-10-02 ENCOUNTER — TELEPHONE (OUTPATIENT)
Age: 84
End: 2024-10-02

## 2024-10-02 NOTE — TELEPHONE ENCOUNTER
Patients GI provider:  Dr. Leary     Number to return call: (    Reason for call: Pt son called in cancelled upcoming appt patient has passed away on   wanted to make the office aware of this to joey chart as      Scheduled procedure/appointment date if applicable: Apt/procedure